# Patient Record
Sex: FEMALE | Race: WHITE | NOT HISPANIC OR LATINO | Employment: OTHER | URBAN - METROPOLITAN AREA
[De-identification: names, ages, dates, MRNs, and addresses within clinical notes are randomized per-mention and may not be internally consistent; named-entity substitution may affect disease eponyms.]

---

## 2017-01-25 ENCOUNTER — HOSPITAL ENCOUNTER (OUTPATIENT)
Dept: RADIOLOGY | Facility: HOSPITAL | Age: 32
Discharge: HOME | End: 2017-01-25
Attending: NURSE PRACTITIONER
Payer: MEDICARE

## 2017-01-25 ENCOUNTER — OFFICE VISIT (OUTPATIENT)
Dept: URGENT CARE | Facility: CLINIC | Age: 32
End: 2017-01-25
Payer: MEDICARE

## 2017-01-25 VITALS
TEMPERATURE: 98 F | WEIGHT: 167.88 LBS | RESPIRATION RATE: 18 BRPM | SYSTOLIC BLOOD PRESSURE: 116 MMHG | HEART RATE: 76 BPM | HEIGHT: 62 IN | BODY MASS INDEX: 30.89 KG/M2 | DIASTOLIC BLOOD PRESSURE: 78 MMHG | OXYGEN SATURATION: 99 %

## 2017-01-25 DIAGNOSIS — M25.561 RIGHT KNEE PAIN, UNSPECIFIED CHRONICITY: Primary | ICD-10-CM

## 2017-01-25 DIAGNOSIS — B35.1 ONYCHOMYCOSIS: ICD-10-CM

## 2017-01-25 PROCEDURE — 73560 X-RAY EXAM OF KNEE 1 OR 2: CPT | Mod: TC,59,PO,LT

## 2017-01-25 PROCEDURE — 73560 X-RAY EXAM OF KNEE 1 OR 2: CPT | Mod: 26,59,LT, | Performed by: RADIOLOGY

## 2017-01-25 PROCEDURE — 99999 PR PBB SHADOW E&M-EST. PATIENT-LVL IV: CPT | Mod: PBBFAC,,, | Performed by: NURSE PRACTITIONER

## 2017-01-25 PROCEDURE — 99214 OFFICE O/P EST MOD 30 MIN: CPT | Mod: S$PBB,,, | Performed by: NURSE PRACTITIONER

## 2017-01-25 PROCEDURE — 73562 X-RAY EXAM OF KNEE 3: CPT | Mod: 26,RT,, | Performed by: RADIOLOGY

## 2017-01-25 RX ORDER — IBUPROFEN 800 MG/1
800 TABLET ORAL 3 TIMES DAILY
Qty: 30 TABLET | Refills: 0 | Status: SHIPPED | OUTPATIENT
Start: 2017-01-25 | End: 2017-02-04

## 2017-01-25 NOTE — MR AVS SNAPSHOT
[facility]  [address]  [facility] [address]  Phone: [phone]  Fax: [phone]                  Marissa Moctezuma   2017 6:20 PM   Office Visit    Description:  Female : 1985   Provider:  Allison Amin NP   Department:  [facility]           Reason for Visit     Knee Pain           Diagnoses this Visit        Comments    Right knee pain, unspecified chronicity    -  Primary Ice, elevate, use ace wrap or sleeve, rest    Onychomycosis                To Do List           Goals (5 Years of Data)     None       These Medications        Disp Refills Start End    ibuprofen (ADVIL,MOTRIN) 800 MG tablet 30 tablet 0 2017    Take 1 tablet (800 mg total) by mouth 3 (three) times daily. - Oral    Pharmacy: [facility] [address] [place] [address] [facility] AT [facility] Ph #: [phone]         [facility] On Call     [facility] On Call Nurse Care Line -  Assistance  Registered nurses in the [facility] On Call Center provide clinical advisement, health education, appointment booking, and other advisory services.  Call for this free service at [phone].             Medications           Message regarding Medications     Verify the changes and/or additions to your medication regime listed below are the same as discussed with your clinician today.  If any of these changes or additions are incorrect, please notify your healthcare provider.        START taking these NEW medications        Refills    ibuprofen (ADVIL,MOTRIN) 800 MG tablet 0    Sig: Take 1 tablet (800 mg total) by mouth 3 (three) times daily.    Class: Normal    Route: Oral      STOP taking these medications     meloxicam (MOBIC) 15 MG tablet TAKE 1 TABLET(15 MG) BY MOUTH EVERY DAY    fluticasone (FLONASE) 50 mcg/actuation nasal spray SPRAY 2 SPRAYS IN EACH NOSTRIL EVERY DAY           Verify that the below list of  "medications is an accurate representation of the medications you are currently taking.  If none reported, the list may be blank. If incorrect, please contact your healthcare provider. Carry this list with you in case of emergency.           Current Medications     montelukast (SINGULAIR) 10 mg tablet TAKE 1 TABLET(10 MG) BY MOUTH EVERY EVENING    norethindrone-ethinyl estradiol (NECON 1/35, 28,) 1-35 mg-mcg per tablet Take 1 tablet by mouth once daily.    gabapentin (NEURONTIN) 300 MG capsule Take 1 capsule (300 mg total) by mouth every evening.    ibuprofen (ADVIL,MOTRIN) 800 MG tablet Take 1 tablet (800 mg total) by mouth 3 (three) times daily.           Clinical Reference Information           Vital Signs - Last Recorded  Most recent update: 1/25/2017  6:51 PM by Alok Childers LPN    BP Pulse Temp Resp Ht    116/78 (BP Location: Right arm, Patient Position: Sitting, BP Method: Manual) 76 98.1 °F (36.7 °C) (Tympanic) 18 5' 2.25" (1.581 m)    Wt LMP SpO2 BMI    76.1 kg (167 lb 14.1 oz) 11/21/2016 99% 30.46 kg/m2      Blood Pressure          Most Recent Value    BP  116/78      Allergies as of 1/25/2017     No Known Allergies      Immunizations Administered on Date of Encounter - 1/25/2017     None      Orders Placed During Today's Visit      Normal Orders This Visit    Ambulatory referral to Podiatry     POCT Apply ace wrap       [facility] Sign-Up     Activating your [facility] account is as easy as 1-2-3!     1) Visit [email or web address], select Sign Up Now, enter this activation code and your date of birth, then select Next.  [email or web address]  Expires: 3/11/2017  7:21 PM      2) Create a username and password to use when you visit [facility] in the future and select a security question in case you lose your password and select Next.    3) Enter your e-mail address and click Sign Up!    Additional Information  If you have questions, please e-mail [email or web address] or call [phone] to talk to our [facility] staff. " Remember, [facility] is NOT to be used for urgent needs. For medical emergencies, dial 911.         Instructions      Knee Pain  Knee pain is very common. Its especially common in active people who put a lot of pressure on their knees, like runners. It affects women more often than men.  Your kneecap (patella) is a thick, round bone. It covers and protects the front portion of your knee joint. It moves along a groove in your thighbone (femur) as part of the patellofemoral joint. A layer of cartilage surrounds the underside of your kneecap. This layer protects it from grinding against your femur.  When this cartilage softens and breaks down, it can cause knee pain. This is partly because of repetitive stress. The stress irritates the lining of the joint. This causes pain in the underlying bone.  What causes knee pain?  Many things can cause knee pain. You may have more than one cause. Some of these include:  · Overuse of the knee joint  · The kneecap doesnt line up with the tissue around it  · Damage to small nerves in the area  · Damage to the ligament-like structure that holds the kneecap in place (retinaculum)  · Breakdown of the bone under the cartilage  · Swelling in the soft tissues around the kneecap  · Injury  You might be more likely to have knee pain if you:  · Exercise a lot  · Recently increased the intensity of your workouts  · Have a body mass index (BMI) greater than 25  · Have poor alignment of your kneecap  · Walk with your feet turned overly outward or inward  · Have weakness in surrounding muscle groups (inner quad or hip adductor muscles)  · Have too much tightness in surrounding muscle groups (hamstrings or iliotibial band)  · Have a recent history of injury to the area  · Are female  Symptoms of knee pain  This type of knee pain is a dull, aching pain in the front of the knee in the area under and around the kneecap. This pain may start quickly or slowly. Your pain might be worse when you  squat, run, or sit for a long time. You might also sometimes feel like your knee is giving out. You may have symptoms in one or both of your knees.  Diagnosing knee pain  Your health care provider will ask about your medical history and your symptoms. Be sure to describe any activities that make your knee pain worse. He or she will look at your knee. This will include tests of your range of motion, strength, and areas of pain of your knee. Your knee alignment will be checked.  Your health care provider will need to rule out other causes of your knee pain, such as arthritis. You may need an imaging test, such as an X-ray or MRI.  Treatment for knee pain  Treatments that can help ease your symptoms may include:  · Avoiding activities for a while that make your pain worse, returning to activity over time  · Icing the outside of your knee when it causes you pain  · Taking over-the-counter pain medicine  · Wearing a knee brace or taping your knee to support it  · Wearing special shoe inserts to help keep your feet in the proper alignment  · Doing special exercises to stretch and strengthen the muscles around your hip and your knee  These steps help most people manage knee pain. But some cases of knee pain need to be treated with surgery. You may need surgery right away. Or you may need it later if other treatments dont work. Your health care provider may refer you to an orthopedic surgeon. He or she will talk with you about your choices.  Preventing knee pain  Losing weight and correcting excess muscle tightness or muscle weakness may help lower your risk.  In some cases, you can prevent knee pain. To help prevent a flare-up of knee pain, you do these things:  · Regularly do all the exercises your doctor or physical therapist advises  · Support your knee as advised by your doctor or physical therapist  · Increase training gradually, and ease up on training when needed  · Have an expert check your gait for running or  other sporting activities  · Stretch properly before and after exercise  · Replace your running shoes regularly  · Lose excess weight     When to call your health care provider  Call your health care provider right away if:  · Your symptoms dont get better after a few weeks of treatment  · You have any new symptoms      © 9642-0616 [employer]. [address] [place] [address]. All rights reserved. This information is not intended as a substitute for professional medical care. Always follow your healthcare professional's instructions.        Fungal Infection of Nails  Fungal infection of the nails changes the way fingernails and toenails look. They may thicken, discolor, change shape, or split. This condition is hard to treat because nails grow slowly and have limited blood supply. It is common for the infection to come back after treatment.  There are 2 types of medicines used.  · Topical anti-fungal medicines are applied to the surface of the skin and nail area. These medicines are not very effective because they cannot get deep into the nail.  · Topical medicines are most useful in combination with oral medicines. Oral antifungal medicines work better because they penetrate the nail from the inside out. However, recurrence still occurs and it may take 9 to 12 months to determine if you have been cured or not (i.e., for your nail to look normal again). You can repeat treatment if needed. Medications are well-tolerated and it is uncommon to need to stop therapy due to side effects, but your doctor may perform some monitoring tests. Discuss potential side effects with your doctor before starting treatment.  If medicines fail, the nail can be removed surgically or chemically. This improves the effectiveness of medical treatment because the fungus is physically removed from the body.  Home care  The following will help you care for your infection at home:  1. Use medicines exactly as directed  for as long as directed. Treating a fungal infection can take longer than other kinds of infections.  2. Smoking is a risk factor for fungal infection. This is one more reason to quit.  3. Wear absorbent socks and shoes that let your feet breathe. Sweaty feet increase risk of fungal infection and make an existing infection harder to treat.  4. Use footwear when in damp public places like swimming pools, gyms, and shower rooms. This will help you avoid the fungus that grows there.  5. Don't share nail clippers or scissors with others.  Follow-up care  Follow up with your doctor as directed by our staff.  When to seek medical care  Get prompt medical attention if any of the following occur:  · Skin by the nail becomes reddened, swollen, painful, or drains pus  · Side effects from oral anti-fungal medicines  © 6929-7506 [employer]. [address] [place] [address]. All rights reserved. This information is not intended as a substitute for professional medical care. Always follow your healthcare professional's instructions.             Smoking Cessation     If you would like to quit smoking:   You may be eligible for free services if you are a [place] resident and started smoking cigarettes before September 1, 1988.  Call the Smoking Cessation Trust (SCT) toll free at (402) 897-3812 or (742) 434-1061.   Call 800-QUIT-NOW if you do not meet the above criteria.

## 2017-01-26 NOTE — PATIENT INSTRUCTIONS
Knee Pain  Knee pain is very common. Its especially common in active people who put a lot of pressure on their knees, like runners. It affects women more often than men.  Your kneecap (patella) is a thick, round bone. It covers and protects the front portion of your knee joint. It moves along a groove in your thighbone (femur) as part of the patellofemoral joint. A layer of cartilage surrounds the underside of your kneecap. This layer protects it from grinding against your femur.  When this cartilage softens and breaks down, it can cause knee pain. This is partly because of repetitive stress. The stress irritates the lining of the joint. This causes pain in the underlying bone.  What causes knee pain?  Many things can cause knee pain. You may have more than one cause. Some of these include:  · Overuse of the knee joint  · The kneecap doesnt line up with the tissue around it  · Damage to small nerves in the area  · Damage to the ligament-like structure that holds the kneecap in place (retinaculum)  · Breakdown of the bone under the cartilage  · Swelling in the soft tissues around the kneecap  · Injury  You might be more likely to have knee pain if you:  · Exercise a lot  · Recently increased the intensity of your workouts  · Have a body mass index (BMI) greater than 25  · Have poor alignment of your kneecap  · Walk with your feet turned overly outward or inward  · Have weakness in surrounding muscle groups (inner quad or hip adductor muscles)  · Have too much tightness in surrounding muscle groups (hamstrings or iliotibial band)  · Have a recent history of injury to the area  · Are female  Symptoms of knee pain  This type of knee pain is a dull, aching pain in the front of the knee in the area under and around the kneecap. This pain may start quickly or slowly. Your pain might be worse when you squat, run, or sit for a long time. You might also sometimes feel like your knee is giving out. You may have symptoms in  one or both of your knees.  Diagnosing knee pain  Your health care provider will ask about your medical history and your symptoms. Be sure to describe any activities that make your knee pain worse. He or she will look at your knee. This will include tests of your range of motion, strength, and areas of pain of your knee. Your knee alignment will be checked.  Your health care provider will need to rule out other causes of your knee pain, such as arthritis. You may need an imaging test, such as an X-ray or MRI.  Treatment for knee pain  Treatments that can help ease your symptoms may include:  · Avoiding activities for a while that make your pain worse, returning to activity over time  · Icing the outside of your knee when it causes you pain  · Taking over-the-counter pain medicine  · Wearing a knee brace or taping your knee to support it  · Wearing special shoe inserts to help keep your feet in the proper alignment  · Doing special exercises to stretch and strengthen the muscles around your hip and your knee  These steps help most people manage knee pain. But some cases of knee pain need to be treated with surgery. You may need surgery right away. Or you may need it later if other treatments dont work. Your health care provider may refer you to an orthopedic surgeon. He or she will talk with you about your choices.  Preventing knee pain  Losing weight and correcting excess muscle tightness or muscle weakness may help lower your risk.  In some cases, you can prevent knee pain. To help prevent a flare-up of knee pain, you do these things:  · Regularly do all the exercises your doctor or physical therapist advises  · Support your knee as advised by your doctor or physical therapist  · Increase training gradually, and ease up on training when needed  · Have an expert check your gait for running or other sporting activities  · Stretch properly before and after exercise  · Replace your running shoes regularly  · Lose  excess weight     When to call your health care provider  Call your health care provider right away if:  · Your symptoms dont get better after a few weeks of treatment  · You have any new symptoms      © 7297-3914 [employer]. [address] [place] [address], [place] [address]. All rights reserved. This information is not intended as a substitute for professional medical care. Always follow your healthcare professional's instructions.        Fungal Infection of Nails  Fungal infection of the nails changes the way fingernails and toenails look. They may thicken, discolor, change shape, or split. This condition is hard to treat because nails grow slowly and have limited blood supply. It is common for the infection to come back after treatment.  There are 2 types of medicines used.  · Topical anti-fungal medicines are applied to the surface of the skin and nail area. These medicines are not very effective because they cannot get deep into the nail.  · Topical medicines are most useful in combination with oral medicines. Oral antifungal medicines work better because they penetrate the nail from the inside out. However, recurrence still occurs and it may take 9 to 12 months to determine if you have been cured or not (i.e., for your nail to look normal again). You can repeat treatment if needed. Medications are well-tolerated and it is uncommon to need to stop therapy due to side effects, but your doctor may perform some monitoring tests. Discuss potential side effects with your doctor before starting treatment.  If medicines fail, the nail can be removed surgically or chemically. This improves the effectiveness of medical treatment because the fungus is physically removed from the body.  Home care  The following will help you care for your infection at home:  1. Use medicines exactly as directed for as long as directed. Treating a fungal infection can take longer than other kinds of infections.  2. Smoking is a  risk factor for fungal infection. This is one more reason to quit.  3. Wear absorbent socks and shoes that let your feet breathe. Sweaty feet increase risk of fungal infection and make an existing infection harder to treat.  4. Use footwear when in damp public places like swimming pools, gyms, and shower rooms. This will help you avoid the fungus that grows there.  5. Don't share nail clippers or scissors with others.  Follow-up care  Follow up with your doctor as directed by our staff.  When to seek medical care  Get prompt medical attention if any of the following occur:  · Skin by the nail becomes reddened, swollen, painful, or drains pus  · Side effects from oral anti-fungal medicines  © 3944-1599 [employer]. [address] [place] [address], [place] [address]. All rights reserved. This information is not intended as a substitute for professional medical care. Always follow your healthcare professional's instructions.

## 2017-01-26 NOTE — PROGRESS NOTES
Subjective:       Patient ID: Marissa Moctezuma is a 31 y.o. female.    Chief Complaint: Knee Pain (Right knee )    Knee Pain    Incident onset: 2-3 weeks. There was no injury mechanism (increased walking). The pain is present in the right knee. Pertinent negatives include no inability to bear weight, loss of motion, loss of sensation, muscle weakness, numbness or tingling. The symptoms are aggravated by palpation, movement and weight bearing. She has tried NSAIDs (low dose x1) for the symptoms. The treatment provided mild relief.     Review of Systems   Musculoskeletal: Positive for gait problem and joint swelling.   Skin: Negative for wound.   Neurological: Negative for tingling and numbness.       Objective:      Physical Exam   Constitutional: She is oriented to person, place, and time. She appears well-developed and well-nourished. No distress.   Musculoskeletal:        Right knee: She exhibits swelling (mild). She exhibits normal range of motion, no ecchymosis, no deformity, no laceration, no erythema, normal alignment, no LCL laxity and normal patellar mobility. Tenderness found.        Left knee: She exhibits normal range of motion, no effusion, no ecchymosis, no deformity, no laceration, no erythema, normal alignment and no LCL laxity. No tenderness found.        Right lower leg: Normal.        Legs:  MILAN crepitus noted; no increased warmth or erythema, skin intact, negative Homans;    Neurological: She is alert and oriented to person, place, and time.   Skin: She is not diaphoretic.   Fungus of nails of left foot       Assessment:       1. Right knee pain, unspecified chronicity    2. Onychomycosis        Plan:   Marissa was seen today for knee pain.    Diagnoses and all orders for this visit:    Right knee pain, unspecified chronicity  Comments:  Ice, elevate, use ace wrap or sleeve, rest  Orders:  -     ibuprofen (ADVIL,MOTRIN) 800 MG tablet; Take 1 tablet (800 mg total) by mouth 3 (three) times  daily.  -     POCT Apply ace wrap  -     X-ray Knee Ortho Right    Onychomycosis  -     Ambulatory referral to Podiatry      Counseled on using ice, gentle ROM, elevation, rest, avoid aggravating factors. Follow up with PCP in 1 week if no improvement/worsening or sooner if fever, redness/color change, increased pain, increased swelling to area and/or numbness/tingling to extremity.  -     Diagnosis, treatment, AVS reviewed with patient  -     Patient understands and agrees with plan

## 2017-01-27 NOTE — PROGRESS NOTES
Called patient and informed of results.  Expressed consent and understanding.  States that it is starting to feel a little better.

## 2017-03-06 ENCOUNTER — OFFICE VISIT (OUTPATIENT)
Dept: INTERNAL MEDICINE | Facility: CLINIC | Age: 32
End: 2017-03-06
Payer: MEDICARE

## 2017-03-06 VITALS
HEART RATE: 87 BPM | HEIGHT: 62 IN | WEIGHT: 173.5 LBS | DIASTOLIC BLOOD PRESSURE: 84 MMHG | OXYGEN SATURATION: 95 % | TEMPERATURE: 98 F | SYSTOLIC BLOOD PRESSURE: 116 MMHG | BODY MASS INDEX: 31.93 KG/M2

## 2017-03-06 DIAGNOSIS — M47.817 SPONDYLOSIS OF LUMBOSACRAL REGION WITHOUT MYELOPATHY OR RADICULOPATHY: ICD-10-CM

## 2017-03-06 DIAGNOSIS — J06.9 VIRAL UPPER RESPIRATORY ILLNESS: Primary | ICD-10-CM

## 2017-03-06 DIAGNOSIS — G89.21 CHRONIC PAIN DUE TO TRAUMA: ICD-10-CM

## 2017-03-06 DIAGNOSIS — Z00.00 ROUTINE GENERAL MEDICAL EXAMINATION AT A HEALTH CARE FACILITY: ICD-10-CM

## 2017-03-06 DIAGNOSIS — E78.5 HYPERLIPIDEMIA, UNSPECIFIED HYPERLIPIDEMIA TYPE: ICD-10-CM

## 2017-03-06 DIAGNOSIS — M79.18 MYOFASCIAL PAIN: ICD-10-CM

## 2017-03-06 PROCEDURE — 99999 PR PBB SHADOW E&M-EST. PATIENT-LVL III: CPT | Mod: PBBFAC,,, | Performed by: INTERNAL MEDICINE

## 2017-03-06 PROCEDURE — 99213 OFFICE O/P EST LOW 20 MIN: CPT | Mod: S$PBB,,, | Performed by: INTERNAL MEDICINE

## 2017-03-06 PROCEDURE — 99213 OFFICE O/P EST LOW 20 MIN: CPT | Mod: PBBFAC,PO | Performed by: INTERNAL MEDICINE

## 2017-03-06 RX ORDER — GABAPENTIN 300 MG/1
300 CAPSULE ORAL NIGHTLY
Qty: 30 CAPSULE | Refills: 2
Start: 2017-03-06 | End: 2017-08-14

## 2017-03-06 RX ORDER — FLUTICASONE PROPIONATE 50 MCG
1 SPRAY, SUSPENSION (ML) NASAL DAILY
Qty: 1 BOTTLE | Refills: 11 | Status: SHIPPED | OUTPATIENT
Start: 2017-03-06 | End: 2017-11-08

## 2017-03-06 RX ORDER — MINERAL OIL
180 ENEMA (ML) RECTAL DAILY
Qty: 30 TABLET | Refills: 11 | Status: SHIPPED | OUTPATIENT
Start: 2017-03-06 | End: 2017-03-13

## 2017-03-06 NOTE — MR AVS SNAPSHOT
[facility]  [address] [place] [address]  Phone: [phone]                  Marissa Moctezuma   3/6/2017 3:00 PM   Office Visit    Description:  Female : 1985   Provider:  Wyatt Leon MD   Department:  [facility]           Reason for Visit     Cough     URI           Diagnoses this Visit        Comments    Viral upper respiratory illness    -  Primary     Spondylosis of lumbosacral region without myelopathy or radiculopathy         Myofascial pain         Chronic pain due to trauma         Routine general medical examination at a health care facility         Hyperlipidemia, unspecified hyperlipidemia type                To Do List           Future Appointments        Provider Department Dept Phone    3/13/2017 9:00 AM Wyatt Leon MD [facility] [phone]    3/13/2017 10:50 AM LABORATORY, [facility] [phone]      Goals (5 Years of Data)     None       These Medications        Disp Refills Start End    gabapentin (NEURONTIN) 300 MG capsule 30 capsule 2 3/6/2017 2017    Take 1 capsule (300 mg total) by mouth every evening. - Oral    Pharmacy: [facility] [address] [place] [address] [place] - [address] [facility] Ph #: [phone]       fluticasone (FLONASE) 50 mcg/actuation nasal spray 1 Bottle 11 3/6/2017     1 spray by Each Nare route once daily. - Each Nare    Pharmacy: [facility] [address] [place] [address] [place] - [address] [facility] Ph #: [phone]       fexofenadine (ALLEGRA) 180 MG tablet 30 tablet 11 3/6/2017 3/6/2018    Take 1 tablet (180 mg total) by mouth once daily. - Oral    Pharmacy: [facility] [address] [place] [address] [place] - [address] [facility] Ph #: [phone]         [facility]     [facility] Care [facility] -   "Assistance  Registered nurses in the [facility] provide clinical advisement, health education, appointment booking, and other advisory services.  Call for this free service at [phone].             Medications           Message regarding Medications     Verify the changes and/or additions to your medication regime listed below are the same as discussed with your clinician today.  If any of these changes or additions are incorrect, please notify your healthcare provider.        START taking these NEW medications        Refills    fluticasone (FLONASE) 50 mcg/actuation nasal spray 11    Si spray by Each Nare route once daily.    Class: Normal    Route: Each Nare    fexofenadine (ALLEGRA) 180 MG tablet 11    Sig: Take 1 tablet (180 mg total) by mouth once daily.    Class: Normal    Route: Oral           Verify that the below list of medications is an accurate representation of the medications you are currently taking.  If none reported, the list may be blank. If incorrect, please contact your healthcare provider. Carry this list with you in case of emergency.           Current Medications     montelukast (SINGULAIR) 10 mg tablet TAKE 1 TABLET(10 MG) BY MOUTH EVERY EVENING    norethindrone-ethinyl estradiol (NECON , ,) 1-35 mg-mcg per tablet Take 1 tablet by mouth once daily.    fexofenadine (ALLEGRA) 180 MG tablet Take 1 tablet (180 mg total) by mouth once daily.    fluticasone (FLONASE) 50 mcg/actuation nasal spray 1 spray by Each Nare route once daily.    gabapentin (NEURONTIN) 300 MG capsule Take 1 capsule (300 mg total) by mouth every evening.           Clinical Reference Information           Your Vitals Were     BP Pulse Temp Height Weight SpO2    116/84 87 97.8 °F (36.6 °C) (Tympanic) 5' 2" (1.575 m) 78.7 kg (173 lb 8 oz) 95%    BMI                31.73 kg/m2          Blood Pressure          Most Recent Value    BP  116/84      Allergies as of 3/6/2017     No Known Allergies    "   Immunizations Administered on Date of Encounter - 3/6/2017     None      Orders Placed During Today's Visit     Future Labs/Procedures Expected by Expires    CBC auto differential  3/6/2017 5/5/2018    Comprehensive metabolic panel  3/6/2017 5/5/2018    Lipid panel  3/6/2017 5/5/2018    TSH  3/6/2017 5/5/2018      [facility] Sign-Up     Activating your [facility] account is as easy as 1-2-3!     1) Visit "[email or web address], select Sign Up Now, enter this activation code and your date of birth, then select Next.  [email or web address]  Expires: 3/11/2017  7:21 PM      2) Create a username and password to use when you visit [facility] in the future and select a security question in case you lose your password and select Next.    3) Enter your e-mail address and click Sign Up!    Additional Information  If you have questions, please e-mail [email or web address] or call [phone] to talk to our [facility] staff. Remember, [facility] is NOT to be used for urgent needs. For medical emergencies, dial 911.         Smoking Cessation     If you would like to quit smoking:   You may be eligible for free services if you are a [place] resident and started smoking cigarettes before September 1, 1988.  Call the Smoking Cessation Trust ([facility]) toll free at (889) 032-1381 or (568) 262-8777.   Call 1-800-QUIT-NOW if you do not meet the above criteria.            Language Assistance Services     ATTENTION: Language assistance services are available, free of charge. Please call [phone].      ATENCIÓN: Si habla español, tiene a amaya disposición servicios gratuitos de asistencia lingüística. Llame al [phone].     CHÚ Ý: N?u b?n nói Ti?ng Vi?t, có các d?ch v? h? tr? ngôn ng? mi?n phí dành cho b?n. G?i s? [phone].         [place] [facility] complies with applicable Federal civil rights laws and does not discriminate on the basis of race, color, national origin, age, disability, or sex.

## 2017-03-06 NOTE — PROGRESS NOTES
"HPI:  Patient is a 31-year-old female who comes in today with complaints of sinus and head and chest congestion for the last 2 weeks.  She denies any productive sputum.  She's had no fever, chills or sweats.  She also complains of feeling bloated in her abdomen.  She denies any nausea, vomiting, diarrhea or change in her stools    Current meds have been verified and updated per the EMR  Exam:/84  Pulse 87  Temp 97.8 °F (36.6 °C) (Tympanic)   Ht 5' 2" (1.575 m)  Wt 78.7 kg (173 lb 8 oz)  SpO2 95%  BMI 31.73 kg/m2  TMs normal, throat normal, nasal mucosa unremarkable, chest clear, abdomen soft, benign, no rebound no guarding, no distention.  Bowel sounds normal    Impression:  ALLERGIC rhinitis  Patient Active Problem List   Diagnosis   (none) - all problems resolved or deleted       Plan:  Orders Placed This Encounter    Comprehensive metabolic panel    Lipid panel    TSH    CBC auto differential    gabapentin (NEURONTIN) 300 MG capsule    fluticasone (FLONASE) 50 mcg/actuation nasal spray    fexofenadine (ALLEGRA) 180 MG tablet     She was told to continue with the Singulair and also was started on Flonase and Allegra.  She will see me after have the above lab work for her annual physical.    "

## 2017-03-07 DIAGNOSIS — Z30.41 ENCOUNTER FOR SURVEILLANCE OF CONTRACEPTIVE PILLS: ICD-10-CM

## 2017-03-08 RX ORDER — NORETHINDRONE AND ETHINYL ESTRADIOL 1 MG-35MCG
KIT ORAL
Qty: 84 TABLET | Refills: 1 | OUTPATIENT
Start: 2017-03-08

## 2017-03-08 NOTE — TELEPHONE ENCOUNTER
----- Message from Pierre Kerr sent at 3/8/2017  3:24 PM CST -----  Contact: pt  She's calling in regards to incorrect BC pills given to her, please advise, [phone] (home)

## 2017-03-09 ENCOUNTER — TELEPHONE (OUTPATIENT)
Dept: OBSTETRICS AND GYNECOLOGY | Facility: CLINIC | Age: 32
End: 2017-03-09

## 2017-03-13 ENCOUNTER — LAB VISIT (OUTPATIENT)
Dept: LAB | Facility: HOSPITAL | Age: 32
End: 2017-03-13
Attending: INTERNAL MEDICINE
Payer: MEDICARE

## 2017-03-13 ENCOUNTER — OFFICE VISIT (OUTPATIENT)
Dept: INTERNAL MEDICINE | Facility: CLINIC | Age: 32
End: 2017-03-13
Payer: MEDICARE

## 2017-03-13 VITALS
WEIGHT: 172.81 LBS | HEIGHT: 62 IN | SYSTOLIC BLOOD PRESSURE: 110 MMHG | HEART RATE: 99 BPM | BODY MASS INDEX: 31.8 KG/M2 | DIASTOLIC BLOOD PRESSURE: 78 MMHG | OXYGEN SATURATION: 98 % | TEMPERATURE: 99 F

## 2017-03-13 DIAGNOSIS — J06.9 VIRAL UPPER RESPIRATORY ILLNESS: ICD-10-CM

## 2017-03-13 DIAGNOSIS — Z12.4 CERVICAL CANCER SCREENING: ICD-10-CM

## 2017-03-13 DIAGNOSIS — E78.5 HYPERLIPIDEMIA, UNSPECIFIED HYPERLIPIDEMIA TYPE: ICD-10-CM

## 2017-03-13 DIAGNOSIS — J31.0 CHRONIC RHINITIS: ICD-10-CM

## 2017-03-13 DIAGNOSIS — Z00.00 ROUTINE GENERAL MEDICAL EXAMINATION AT A HEALTH CARE FACILITY: Primary | ICD-10-CM

## 2017-03-13 DIAGNOSIS — S06.9X9D: ICD-10-CM

## 2017-03-13 DIAGNOSIS — M47.817 SPONDYLOSIS OF LUMBOSACRAL REGION WITHOUT MYELOPATHY OR RADICULOPATHY: ICD-10-CM

## 2017-03-13 DIAGNOSIS — Z00.00 ROUTINE GENERAL MEDICAL EXAMINATION AT A HEALTH CARE FACILITY: ICD-10-CM

## 2017-03-13 PROCEDURE — 99214 OFFICE O/P EST MOD 30 MIN: CPT | Mod: S$PBB,,, | Performed by: INTERNAL MEDICINE

## 2017-03-13 PROCEDURE — 99999 PR PBB SHADOW E&M-EST. PATIENT-LVL IV: CPT | Mod: PBBFAC,,, | Performed by: INTERNAL MEDICINE

## 2017-03-13 RX ORDER — BENZONATATE 200 MG/1
200 CAPSULE ORAL 3 TIMES DAILY PRN
Qty: 30 CAPSULE | Refills: 1 | Status: SHIPPED | OUTPATIENT
Start: 2017-03-13 | End: 2017-03-23

## 2017-03-13 NOTE — MR AVS SNAPSHOT
[facility]  [address] [place] [address]  Phone: [phone]                  Marissa Moctezuma   3/13/2017 9:00 AM   Office Visit    Description:  Female : 1985   Provider:  Wyatt Leon MD   Department:  [facility]           Diagnoses this Visit        Comments    Routine general medical examination at a health care facility    -  Primary     Closed TBI (traumatic brain injury), with loss of consciousness of unspecified duration, subsequent encounter         Chronic rhinitis         Cervical cancer screening         Viral upper respiratory illness                To Do List           Future Appointments        Provider Department Dept Phone    3/13/2017 10:50 AM LABORATORY, [facility] [phone]      Goals (5 Years of Data)     None       These Medications        Disp Refills Start End    benzonatate (TESSALON) 200 MG capsule 30 capsule 1 3/13/2017 3/23/2017    Take 1 capsule (200 mg total) by mouth 3 (three) times daily as needed for Cough. - Oral    Pharmacy: [facility] [address] [place] [address] [address] [facility] Ph #: [phone]         [facility]     [facility] Care [facility] -  Assistance  Registered nurses in the [facility] provide clinical advisement, health education, appointment booking, and other advisory services.  Call for this free service at [phone].             Medications           Message regarding Medications     Verify the changes and/or additions to your medication regime listed below are the same as discussed with your clinician today.  If any of these changes or additions are incorrect, please notify your healthcare provider.        START taking these NEW medications        Refills    benzonatate (TESSALON) 200 MG capsule 1    Sig: Take 1 capsule (200 mg total) by mouth 3 (three) times daily as needed for  "Cough.    Class: Normal    Route: Oral      STOP taking these medications     fexofenadine (ALLEGRA) 180 MG tablet Take 1 tablet (180 mg total) by mouth once daily.           Verify that the below list of medications is an accurate representation of the medications you are currently taking.  If none reported, the list may be blank. If incorrect, please contact your healthcare provider. Carry this list with you in case of emergency.           Current Medications     fluticasone (FLONASE) 50 mcg/actuation nasal spray 1 spray by Each Nare route once daily.    gabapentin (NEURONTIN) 300 MG capsule Take 1 capsule (300 mg total) by mouth every evening.    montelukast (SINGULAIR) 10 mg tablet TAKE 1 TABLET(10 MG) BY MOUTH EVERY EVENING    norethindrone-ethinyl estradiol (NECON 1/35, 28,) 1-35 mg-mcg per tablet Take 1 tablet by mouth once daily.    benzonatate (TESSALON) 200 MG capsule Take 1 capsule (200 mg total) by mouth 3 (three) times daily as needed for Cough.           Clinical Reference Information           Your Vitals Were     BP Pulse Temp Height Weight SpO2    110/78 99 98.5 °F (36.9 °C) (Tympanic) 5' 2" (1.575 m) 78.4 kg (172 lb 13.5 oz) 98%    BMI                31.61 kg/m2          Blood Pressure          Most Recent Value    BP  110/78      Allergies as of 3/13/2017     No Known Allergies      Immunizations Administered on Date of Encounter - 3/13/2017     Name Date Dose VIS Date Route    Pneumococcal Conjugate - 13 Valent 3/13/2017 0.5 mL 11/5/2015 Intramuscular      Orders Placed During Today's Visit      Normal Orders This Visit    Liquid-based pap smear, screening     Pneumococcal Conjugate Vaccine (13 Valent) (IM)       [facility] Sign-Up     Activating your [facility] account is as easy as 1-2-3!     1) Visit [email or web address], select Sign Up Now, enter this activation code and your date of birth, then select Next.  [email or web address]  Expires: 4/27/2017  9:53 AM      2) Create a username and password to use " when you visit [facility] in the future and select a security question in case you lose your password and select Next.    3) Enter your e-mail address and click Sign Up!    Additional Information  If you have questions, please e-mail [email or web address] or call [phone] to talk to our [employer] staff. Remember, [facility] is NOT to be used for urgent needs. For medical emergencies, dial 911.         Smoking Cessation     If you would like to quit smoking:   You may be eligible for free services if you are a [place] resident and started smoking cigarettes before September 1, 1988.  Call the Smoking Cessation Trust ([facility]) toll free at (809) 410-6949 or (856) 179-7965.   Call 3-063-QUIT-NOW if you do not meet the above criteria.            Language Assistance Services     ATTENTION: Language assistance services are available, free of charge. Please call [phone].      ATENCIÓN: Si habla español, tiene a amaya disposición servicios gratuitos de asistencia lingüística. Llame al [phone].     [facility] Ý: N?u b?n nói Ti?ng Vi?t, có các d?ch v? h? tr? ngôn ng? mi?n phí dành cho b?n. G?i s? [phone].         [facility] complies with applicable Federal civil rights laws and does not discriminate on the basis of race, color, national origin, age, disability, or sex.

## 2017-03-13 NOTE — PROGRESS NOTES
Administered Prevnar 13 IM Right Deltoid Per Dr. Leon. See immunization record. Advised to remain in lobby 15 min to monitor for adverse reaction. Tolerated well/TGD
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
## 2017-03-16 NOTE — PROGRESS NOTES
Your pap smear was normal.      Thanks for trusting us with your healthcare needs and using [facility]. If you want to ask us a question, you can do so by replying to this message or by calling [phone].    Sincerely,    Jeannette Leon M.D.      To rate your experience with Dr. Leon please click on the link below:    [email or web address]=&entityCode=&method=&loc=[email or web address]+[email or web address]&pt=[email or web address]&isNeighborhood=&locType=%7Cstate%7Ccity%7Czip&locIs[place]City=

## 2017-05-16 ENCOUNTER — OFFICE VISIT (OUTPATIENT)
Dept: INTERNAL MEDICINE | Facility: CLINIC | Age: 32
End: 2017-05-16
Payer: MEDICARE

## 2017-05-16 VITALS
SYSTOLIC BLOOD PRESSURE: 137 MMHG | TEMPERATURE: 99 F | WEIGHT: 169.75 LBS | RESPIRATION RATE: 16 BRPM | BODY MASS INDEX: 31.24 KG/M2 | HEART RATE: 107 BPM | HEIGHT: 62 IN | DIASTOLIC BLOOD PRESSURE: 90 MMHG

## 2017-05-16 DIAGNOSIS — J01.00 ACUTE MAXILLARY SINUSITIS, RECURRENCE NOT SPECIFIED: ICD-10-CM

## 2017-05-16 DIAGNOSIS — B35.1 ONYCHOMYCOSIS: Primary | ICD-10-CM

## 2017-05-16 PROCEDURE — 99213 OFFICE O/P EST LOW 20 MIN: CPT | Mod: S$PBB,,, | Performed by: NURSE PRACTITIONER

## 2017-05-16 PROCEDURE — 99213 OFFICE O/P EST LOW 20 MIN: CPT | Mod: PBBFAC,PO | Performed by: NURSE PRACTITIONER

## 2017-05-16 PROCEDURE — 99999 PR PBB SHADOW E&M-EST. PATIENT-LVL III: CPT | Mod: PBBFAC,,, | Performed by: NURSE PRACTITIONER

## 2017-05-16 RX ORDER — AZITHROMYCIN 250 MG/1
TABLET, FILM COATED ORAL
Qty: 6 TABLET | Refills: 0 | Status: SHIPPED | OUTPATIENT
Start: 2017-05-16 | End: 2017-08-14

## 2017-05-16 NOTE — MR AVS SNAPSHOT
[place]  [address] [place] [address]  Phone: [phone]                  Marissa Moctezuma   2017 1:20 PM   Office Visit    Description:  Female : 1985   Provider:  Aldair Mancia NP   Department:  [facility]           Reason for Visit     Nail Problem     Sore Throat           Diagnoses this Visit        Comments    Onychomycosis    -  Primary            To Do List           Future Appointments        Provider Department Dept Phone    2017 2:40 PM Abbie Renee DPM [facility] [phone]      Goals (5 Years of Data)     None       These Medications        Disp Refills Start End    azithromycin (Z-MIKEY) 250 MG tablet 6 tablet 0 2017     Take as directed    Pharmacy: [facility] [address] [place] [address] [address] AT [facility] Ph #: [phone]         [facility] On Call     [facility] On Call Nurse Care Line -  Assistance  Unless otherwise directed by your provider, please contact [facility], our nurse care line that is available for  assistance.     Registered nurses in the [facility] provide: appointment scheduling, clinical advisement, health education, and other advisory services.  Call: [phone] (toll free)               Medications           Message regarding Medications     Verify the changes and/or additions to your medication regime listed below are the same as discussed with your clinician today.  If any of these changes or additions are incorrect, please notify your healthcare provider.        START taking these NEW medications        Refills    azithromycin (Z-MIKEY) 250 MG tablet 0    Sig: Take as directed    Class: Normal           Verify that the below list of medications is an accurate representation of the medications you are currently taking.  If none reported, the list may be blank. If incorrect, please contact your  "healthcare provider. Carry this list with you in case of emergency.           Current Medications     fluticasone (FLONASE) 50 mcg/actuation nasal spray 1 spray by Each Nare route once daily.    norethindrone-ethinyl estradiol (NECON 1/35, 28,) 1-35 mg-mcg per tablet Take 1 tablet by mouth once daily.    azithromycin (Z-MIKEY) 250 MG tablet Take as directed    gabapentin (NEURONTIN) 300 MG capsule Take 1 capsule (300 mg total) by mouth every evening.    montelukast (SINGULAIR) 10 mg tablet TAKE 1 TABLET(10 MG) BY MOUTH EVERY EVENING           Clinical Reference Information           Your Vitals Were     BP Pulse Temp Resp Height Weight    137/90 (BP Location: Left arm, Patient Position: Sitting) 107 99 °F (37.2 °C) (Tympanic) 16 5' 2" (1.575 m) 77 kg (169 lb 12.1 oz)    Last Period PF BMI          04/18/2017 98 L/min 31.05 kg/m2        Blood Pressure          Most Recent Value    BP  (!)  137/90      Allergies as of 5/16/2017     No Known Allergies      Immunizations Administered on Date of Encounter - 5/16/2017     None      Orders Placed During Today's Visit      Normal Orders This Visit    Ambulatory Referral to Podiatry       [facility] Sign-Up     Activating your [facility] account is as easy as 1-2-3!     1) Visit [email or web address], select Sign Up Now, enter this activation code and your date of birth, then select Next.  [email or web address]  Expires: 6/30/2017  2:02 PM      2) Create a username and password to use when you visit [facility] in the future and select a security question in case you lose your password and select Next.    3) Enter your e-mail address and click Sign Up!    Additional Information  If you have questions, please e-mail [email or web address] or call [phone] to talk to our [facility] staff. Remember, [facility] is NOT to be used for urgent needs. For medical emergencies, dial 911.         Smoking Cessation     If you would like to quit smoking:   You may be eligible for free services if you " are a [place] resident and started smoking cigarettes before September 1, 1988.  Call the Smoking Cessation Trust ([facility]) toll free at (929) 001-7249 or (240) 911-3571.   Call 1-800-QUIT-NOW if you do not meet the above criteria.   Contact us via email: [email or web address]   View our website for more information: [email or web address]        Language Assistance Services     ATTENTION: Language assistance services are available, free of charge. Please call [phone].      ATENCIÓN: Si habla español, tiene a amaya disposición servicios gratuitos de asistencia lingüística. Llame al [phone].     CHÚ Ý: N?u b?n nói Ti?ng Vi?t, có các d?ch v? h? tr? ngôn ng? mi?n phí dành cho b?n. G?i s? [phone].         [facility] complies with applicable Federal civil rights laws and does not discriminate on the basis of race, color, national origin, age, disability, or sex.
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
## 2017-05-23 ENCOUNTER — TELEPHONE (OUTPATIENT)
Dept: OBSTETRICS AND GYNECOLOGY | Facility: CLINIC | Age: 32
End: 2017-05-23

## 2017-05-23 NOTE — TELEPHONE ENCOUNTER
----- Message from Ruby Chamorro sent at 5/23/2017  9:02 AM CDT -----  Contact: Pt   Pt is requesting to speak with the nurse in regards to a problem she is having./ Please advise [phone]

## 2017-05-23 NOTE — TELEPHONE ENCOUNTER
Spoke with patient, she is complaining of vaginal odor. I advised the patient to schedule an appointment to come in for evaluation.  She states that she will call back in 10 days due to transportation.

## 2017-05-23 NOTE — TELEPHONE ENCOUNTER
----- Message from Ruby Chamorro sent at 5/23/2017  9:02 AM CDT -----  Contact: Pt   Pt is requesting to speak with the nurse in regards to a problem she is having./ Please advise [phone]

## 2017-06-11 ENCOUNTER — NURSE TRIAGE (OUTPATIENT)
Dept: ADMINISTRATIVE | Facility: CLINIC | Age: 32
End: 2017-06-11

## 2017-06-11 NOTE — TELEPHONE ENCOUNTER
"    Reason for Disposition   Caller requesting a NON-URGENT new prescription or refill and triager unable to refill per unit policy    Answer Assessment - Initial Assessment Questions  1. SYMPTOMS: "Do you have any symptoms?"      Back pain  2. SEVERITY: If symptoms are present, ask "Are they mild, moderate or severe?"      "Really hurt"    Protocols used: ST MEDICATION QUESTION CALL-A-    Patient is made aware of her last conversation with doctors office that she must have an appointment before another refill is called in. Offered to make the appointment. Patient declined.  "

## 2017-06-22 DIAGNOSIS — Z30.41 ENCOUNTER FOR SURVEILLANCE OF CONTRACEPTIVE PILLS: ICD-10-CM

## 2017-06-23 RX ORDER — NORETHINDRONE AND ETHINYL ESTRADIOL 1 MG-35MCG
1 KIT ORAL DAILY
Qty: 28 TABLET | Refills: 1 | Status: SHIPPED | OUTPATIENT
Start: 2017-06-23 | End: 2017-09-14 | Stop reason: SDUPTHER

## 2017-08-14 ENCOUNTER — HOSPITAL ENCOUNTER (OUTPATIENT)
Dept: RADIOLOGY | Facility: HOSPITAL | Age: 32
Discharge: HOME | End: 2017-08-14
Attending: NURSE PRACTITIONER
Payer: MEDICARE

## 2017-08-14 ENCOUNTER — OFFICE VISIT (OUTPATIENT)
Dept: INTERNAL MEDICINE | Facility: CLINIC | Age: 32
End: 2017-08-14
Payer: MEDICARE

## 2017-08-14 ENCOUNTER — TELEPHONE (OUTPATIENT)
Dept: INTERNAL MEDICINE | Facility: CLINIC | Age: 32
End: 2017-08-14

## 2017-08-14 VITALS
OXYGEN SATURATION: 98 % | WEIGHT: 166.69 LBS | TEMPERATURE: 98 F | DIASTOLIC BLOOD PRESSURE: 72 MMHG | BODY MASS INDEX: 30.67 KG/M2 | HEIGHT: 62 IN | HEART RATE: 92 BPM | SYSTOLIC BLOOD PRESSURE: 122 MMHG

## 2017-08-14 DIAGNOSIS — M79.672 LEFT FOOT PAIN: ICD-10-CM

## 2017-08-14 DIAGNOSIS — M79.672 LEFT FOOT PAIN: Primary | ICD-10-CM

## 2017-08-14 DIAGNOSIS — R94.5 ABNORMAL LIVER FUNCTION: ICD-10-CM

## 2017-08-14 DIAGNOSIS — B35.1 ONYCHOMYCOSIS: ICD-10-CM

## 2017-08-14 PROCEDURE — 73630 X-RAY EXAM OF FOOT: CPT | Mod: TC,PO,LT

## 2017-08-14 PROCEDURE — 73630 X-RAY EXAM OF FOOT: CPT | Mod: 26,LT,, | Performed by: RADIOLOGY

## 2017-08-14 PROCEDURE — 99999 PR PBB SHADOW E&M-EST. PATIENT-LVL III: CPT | Mod: PBBFAC,,, | Performed by: NURSE PRACTITIONER

## 2017-08-14 PROCEDURE — 3008F BODY MASS INDEX DOCD: CPT | Mod: ,,, | Performed by: NURSE PRACTITIONER

## 2017-08-14 PROCEDURE — 99214 OFFICE O/P EST MOD 30 MIN: CPT | Mod: S$PBB,,, | Performed by: NURSE PRACTITIONER

## 2017-08-14 RX ORDER — GABAPENTIN 400 MG/1
400 CAPSULE ORAL NIGHTLY
Qty: 30 CAPSULE | Refills: 11 | Status: SHIPPED | OUTPATIENT
Start: 2017-08-14 | End: 2018-05-01 | Stop reason: SDUPTHER

## 2017-08-14 RX ORDER — AMOXICILLIN 875 MG/1
875 TABLET, FILM COATED ORAL EVERY 12 HOURS
Qty: 14 TABLET | Refills: 0 | Status: SHIPPED | OUTPATIENT
Start: 2017-08-14 | End: 2017-09-14 | Stop reason: ALTCHOICE
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
## 2017-08-15 ENCOUNTER — TELEPHONE (OUTPATIENT)
Dept: INTERNAL MEDICINE | Facility: CLINIC | Age: 32
End: 2017-08-15

## 2017-08-15 DIAGNOSIS — R74.8 ABNORMAL LIVER ENZYMES: Primary | ICD-10-CM

## 2017-08-15 RX ORDER — TERBINAFINE HYDROCHLORIDE 250 MG/1
250 TABLET ORAL DAILY
Qty: 30 TABLET | Refills: 0 | Status: SHIPPED | OUTPATIENT
Start: 2017-08-15 | End: 2017-09-14 | Stop reason: ALTCHOICE

## 2017-08-15 NOTE — TELEPHONE ENCOUNTER
S/w pt. Advised as listed. Scheduled labs for one month and 2 months from today. Pt verbalized understanding/TGD

## 2017-08-15 NOTE — TELEPHONE ENCOUNTER
Please let her know her liver test is good per her blood work.  I will start the Lamisil 250 mg daily for 3 months.   She has to have labs every 4 weeks during the treatment, it can be hard on the liver.  Please schedule the AST and ALT labs in 4 weeks then in 4 weeks after that.  I will reorder as we go and I know labs still look good.  I have sent in the first month of the medication to [facility].      thanks

## 2017-08-17 DIAGNOSIS — Z30.41 ENCOUNTER FOR SURVEILLANCE OF CONTRACEPTIVE PILLS: ICD-10-CM

## 2017-08-17 RX ORDER — NORETHINDRONE AND ETHINYL ESTRADIOL 1 MG-35MCG
KIT ORAL
Qty: 28 TABLET | Refills: 0 | OUTPATIENT
Start: 2017-08-17

## 2017-09-08 DIAGNOSIS — Z30.41 ENCOUNTER FOR SURVEILLANCE OF CONTRACEPTIVE PILLS: ICD-10-CM

## 2017-09-11 ENCOUNTER — TELEPHONE (OUTPATIENT)
Dept: OBSTETRICS AND GYNECOLOGY | Facility: CLINIC | Age: 32
End: 2017-09-11

## 2017-09-11 NOTE — TELEPHONE ENCOUNTER
Returned pt call regarding refill on birth control, no answer, left message at [phone] for pt to call the office back.

## 2017-09-11 NOTE — TELEPHONE ENCOUNTER
----- Message from Vickie Treviño sent at 9/11/2017  9:34 AM CDT -----  Call pt t [phone]//regarding a rx for birth control sent to [facility] in [place]/please call pt when it done//tim mas

## 2017-09-11 NOTE — TELEPHONE ENCOUNTER
----- Message from Sheron Heayl sent at 9/11/2017  2:03 PM CDT -----  Contact: pt  Pt leaving a second message for refill.  [phone]

## 2017-09-11 NOTE — TELEPHONE ENCOUNTER
Spoke with patient, she has been advised that per Dr Penn he will not refill any other Rx until he see her for an annual exam.  Her last visit was 11/2015.  Patient voices understanding.

## 2017-09-11 NOTE — TELEPHONE ENCOUNTER
----- Message from Alberta Velásquez sent at 9/11/2017 10:59 AM CDT -----  Contact: Patient  Patient returned call. Please call her back at [phone].    Thanks,  Alberta

## 2017-09-14 ENCOUNTER — OFFICE VISIT (OUTPATIENT)
Dept: INTERNAL MEDICINE | Facility: CLINIC | Age: 32
End: 2017-09-14
Payer: MEDICARE

## 2017-09-14 VITALS
HEART RATE: 82 BPM | TEMPERATURE: 98 F | DIASTOLIC BLOOD PRESSURE: 96 MMHG | HEIGHT: 62 IN | WEIGHT: 170 LBS | OXYGEN SATURATION: 100 % | SYSTOLIC BLOOD PRESSURE: 118 MMHG | BODY MASS INDEX: 31.28 KG/M2

## 2017-09-14 DIAGNOSIS — Z30.41 ENCOUNTER FOR SURVEILLANCE OF CONTRACEPTIVE PILLS: ICD-10-CM

## 2017-09-14 DIAGNOSIS — J02.9 PHARYNGITIS, UNSPECIFIED ETIOLOGY: ICD-10-CM

## 2017-09-14 DIAGNOSIS — H66.91 RIGHT OTITIS MEDIA, UNSPECIFIED CHRONICITY, UNSPECIFIED OTITIS MEDIA TYPE: Primary | ICD-10-CM

## 2017-09-14 LAB
CTP QC/QA: YES
S PYO RRNA THROAT QL PROBE: NEGATIVE

## 2017-09-14 PROCEDURE — 99213 OFFICE O/P EST LOW 20 MIN: CPT | Mod: S$PBB,,, | Performed by: FAMILY MEDICINE

## 2017-09-14 PROCEDURE — 87880 STREP A ASSAY W/OPTIC: CPT | Mod: PBBFAC,PO | Performed by: FAMILY MEDICINE

## 2017-09-14 PROCEDURE — 99999 PR PBB SHADOW E&M-EST. PATIENT-LVL III: CPT | Mod: PBBFAC,,, | Performed by: FAMILY MEDICINE

## 2017-09-14 PROCEDURE — 99213 OFFICE O/P EST LOW 20 MIN: CPT | Mod: PBBFAC,PO | Performed by: FAMILY MEDICINE

## 2017-09-14 RX ORDER — PREDNISONE 20 MG/1
40 TABLET ORAL DAILY
Qty: 8 TABLET | Refills: 0 | Status: SHIPPED | OUTPATIENT
Start: 2017-09-14 | End: 2017-09-18

## 2017-09-14 RX ORDER — NORETHINDRONE AND ETHINYL ESTRADIOL 1 MG-35MCG
KIT ORAL
Qty: 84 TABLET | Refills: 0 | OUTPATIENT
Start: 2017-09-14

## 2017-09-14 RX ORDER — AMOXICILLIN AND CLAVULANATE POTASSIUM 875; 125 MG/1; MG/1
1 TABLET, FILM COATED ORAL 2 TIMES DAILY
Qty: 20 TABLET | Refills: 0 | Status: SHIPPED | OUTPATIENT
Start: 2017-09-14 | End: 2017-09-24
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
## 2017-10-05 DIAGNOSIS — Z30.41 ENCOUNTER FOR SURVEILLANCE OF CONTRACEPTIVE PILLS: ICD-10-CM

## 2017-10-05 RX ORDER — NORETHINDRONE AND ETHINYL ESTRADIOL 1 MG-35MCG
KIT ORAL
Qty: 28 TABLET | Refills: 0 | Status: SHIPPED | OUTPATIENT
Start: 2017-10-05 | End: 2017-11-08 | Stop reason: ALTCHOICE

## 2017-10-06 DIAGNOSIS — Z30.41 ENCOUNTER FOR SURVEILLANCE OF CONTRACEPTIVE PILLS: ICD-10-CM

## 2017-10-06 RX ORDER — NORETHINDRONE AND ETHINYL ESTRADIOL 1 MG-35MCG
KIT ORAL
Qty: 84 TABLET | Refills: 0 | OUTPATIENT
Start: 2017-10-06

## 2017-10-31 DIAGNOSIS — Z30.41 ENCOUNTER FOR SURVEILLANCE OF CONTRACEPTIVE PILLS: ICD-10-CM

## 2017-10-31 RX ORDER — NORETHINDRONE AND ETHINYL ESTRADIOL 1 MG-35MCG
KIT ORAL
Qty: 28 TABLET | Refills: 0 | OUTPATIENT
Start: 2017-10-31

## 2017-11-08 ENCOUNTER — LAB VISIT (OUTPATIENT)
Dept: LAB | Facility: HOSPITAL | Age: 32
End: 2017-11-08
Attending: OBSTETRICS & GYNECOLOGY
Payer: MEDICARE

## 2017-11-08 ENCOUNTER — OFFICE VISIT (OUTPATIENT)
Dept: OBSTETRICS AND GYNECOLOGY | Facility: CLINIC | Age: 32
End: 2017-11-08
Payer: MEDICARE

## 2017-11-08 VITALS
DIASTOLIC BLOOD PRESSURE: 78 MMHG | BODY MASS INDEX: 31.85 KG/M2 | SYSTOLIC BLOOD PRESSURE: 110 MMHG | WEIGHT: 173.06 LBS | HEIGHT: 62 IN

## 2017-11-08 DIAGNOSIS — Z30.41 ENCOUNTER FOR SURVEILLANCE OF CONTRACEPTIVE PILLS: Primary | ICD-10-CM

## 2017-11-08 DIAGNOSIS — Z11.4 ENCOUNTER FOR SCREENING FOR HIV: ICD-10-CM

## 2017-11-08 DIAGNOSIS — Z11.3 SCREEN FOR STD (SEXUALLY TRANSMITTED DISEASE): ICD-10-CM

## 2017-11-08 DIAGNOSIS — N77.1 VAGINITIS, VULVITIS AND VULVOVAGINITIS IN DISEASES CLASSIFIED ELSEWHERE: ICD-10-CM

## 2017-11-08 PROCEDURE — 99999 PR PBB SHADOW E&M-EST. PATIENT-LVL III: CPT | Mod: PBBFAC,,, | Performed by: OBSTETRICS & GYNECOLOGY

## 2017-11-08 PROCEDURE — 99213 OFFICE O/P EST LOW 20 MIN: CPT | Mod: PBBFAC | Performed by: OBSTETRICS & GYNECOLOGY

## 2017-11-08 PROCEDURE — 86592 SYPHILIS TEST NON-TREP QUAL: CPT

## 2017-11-08 PROCEDURE — 86703 HIV-1/HIV-2 1 RESULT ANTBDY: CPT

## 2017-11-08 PROCEDURE — 36415 COLL VENOUS BLD VENIPUNCTURE: CPT

## 2017-11-08 PROCEDURE — 87591 N.GONORRHOEAE DNA AMP PROB: CPT

## 2017-11-08 PROCEDURE — 99213 OFFICE O/P EST LOW 20 MIN: CPT | Mod: S$PBB,,, | Performed by: OBSTETRICS & GYNECOLOGY

## 2017-11-08 PROCEDURE — 87210 SMEAR WET MOUNT SALINE/INK: CPT | Mod: PBBFAC | Performed by: OBSTETRICS & GYNECOLOGY

## 2017-11-08 RX ORDER — ACETAMINOPHEN AND CODEINE PHOSPHATE 120; 12 MG/5ML; MG/5ML
1 SOLUTION ORAL DAILY
Qty: 28 TABLET | Refills: 11 | Status: SHIPPED | OUTPATIENT
Start: 2017-11-08 | End: 2018-02-01 | Stop reason: SDUPTHER

## 2017-11-08 NOTE — PROGRESS NOTES
Subjective:       Patient ID: Mraissa Moctezuma is a 32 y.o. female.    Chief Complaint:  Contraception; STD CHECK; and Vaginal Discharge      History of Present Illness  HPI  Pt complains of vaginal discharge for past week.  Requests STd screen.  Pt also requests refill of OCP.  Had annual exam with PCP in .  Pap at that time NILM.  Pt still smokes.    GYN & OB History  Patient's last menstrual period was 10/04/2017 (exact date).   Date of Last Pap: 3/16/2017    OB History    Para Term  AB Living   1 1           SAB TAB Ectopic Multiple Live Births                  # Outcome Date GA Lbr Faisal/2nd Weight Sex Delivery Anes PTL Lv   1 Para      Vag-Spont             Review of Systems  Review of Systems   Constitutional: Negative for activity change, appetite change, fatigue, fever and unexpected weight change.   Respiratory: Negative for shortness of breath.    Cardiovascular: Negative for chest pain, palpitations and leg swelling.   Gastrointestinal: Negative for abdominal pain, constipation, diarrhea, nausea and vomiting.   Genitourinary: Positive for vaginal discharge and vaginal pain. Negative for dyspareunia, dysuria, flank pain, frequency, genital sores, hematuria, menorrhagia, menstrual problem, pelvic pain, vaginal bleeding, dysmenorrhea and vaginal odor.   Musculoskeletal: Negative for back pain.   Neurological: Negative for syncope and headaches.   Breast: Negative for breast mass, breast pain, nipple discharge and skin changes          Objective:    Physical Exam:   Constitutional: She is oriented to person, place, and time. She appears well-developed and well-nourished. No distress.       Cardiovascular: Normal rate, regular rhythm and normal heart sounds.     Pulmonary/Chest: Effort normal and breath sounds normal.        Abdominal: Soft. Bowel sounds are normal. She exhibits no distension. There is no tenderness.     Genitourinary: Vagina normal and uterus normal. Pelvic exam was  performed with patient supine. There is no rash, tenderness, lesion or injury on the right labia. There is no rash, tenderness, lesion or injury on the left labia. Uterus is not deviated, not enlarged and not tender. Cervix is normal. Right adnexum displays no mass, no tenderness and no fullness. Left adnexum displays no mass, no tenderness and no fullness. No erythema, tenderness or bleeding in the vagina. No foreign body in the vagina. No signs of injury around the vagina. No vaginal discharge found. Cervix exhibits no motion tenderness, no discharge and no friability.   Genitourinary Comments: Wet prep: negative for clue cells, yeast, or trichomonas           Musculoskeletal: Normal range of motion and moves all extremeties. She exhibits no edema or tenderness.       Neurological: She is alert and oriented to person, place, and time.    Skin: Skin is warm and dry.    Psychiatric: She has a normal mood and affect. Her behavior is normal. Thought content normal.          Assessment:        1. Encounter for surveillance of contraceptive pills    2. Screen for STD (sexually transmitted disease)    3. Vaginitis, vulvitis and vulvovaginitis in diseases classified elsewhere     4. Encounter for screening for HIV                Plan:      Encounter for surveillance of contraceptive pills  -     norethindrone (ORTHO MICRONOR) 0.35 mg tablet; Take 1 tablet (0.35 mg total) by mouth once daily.  Dispense: 28 tablet; Refill: 11  -     Pt was counseled on contraception options, including associated risks and benefits of each.  Options are limited as pt will be turning 32 yo soon and is still an active smoker.  Pt voiced understanding and desires to proceed with Micronor.  Medication dosing, side-effects, risks, benefits, and alternatives were discussed.  Medical history was reviewed and pt is a candidate for Micronor use.    Screen for STD (sexually transmitted disease)  -     C. trachomatis/N. gonorrhoeae by AMP DNA Cervix  -      RPR; Future; Expected date: 11/08/2017  -     HIV-1 and HIV-2 antibodies; Future; Expected date: 11/08/2017  -     POCT Wet Prep    Vaginitis, vulvitis and vulvovaginitis in diseases classified elsewhere   -     C. trachomatis/N. gonorrhoeae by AMP DNA Cervix    Encounter for screening for HIV   -     HIV-1 and HIV-2 antibodies; Future; Expected date: 11/08/2017      Return in about 1 year (around 11/8/2018).

## 2017-11-09 ENCOUNTER — TELEPHONE (OUTPATIENT)
Dept: OBSTETRICS AND GYNECOLOGY | Facility: CLINIC | Age: 32
End: 2017-11-09

## 2017-11-09 LAB
C TRACH DNA SPEC QL NAA+PROBE: NOT DETECTED
HIV 1+2 AB+HIV1 P24 AG SERPL QL IA: NEGATIVE
N GONORRHOEA DNA SPEC QL NAA+PROBE: NOT DETECTED
RPR SER QL: NORMAL

## 2017-11-09 NOTE — TELEPHONE ENCOUNTER
----- Message from Duyen Cheung sent at 11/9/2017 10:42 AM CST -----  Contact: Pt   Pt request results of labs.. .[phone] (vsmk)

## 2017-12-02 DIAGNOSIS — Z30.41 ENCOUNTER FOR SURVEILLANCE OF CONTRACEPTIVE PILLS: ICD-10-CM

## 2017-12-07 ENCOUNTER — OFFICE VISIT (OUTPATIENT)
Dept: INTERNAL MEDICINE | Facility: CLINIC | Age: 32
End: 2017-12-07
Payer: MEDICARE

## 2017-12-07 ENCOUNTER — PATIENT OUTREACH (OUTPATIENT)
Dept: ADMINISTRATIVE | Facility: HOSPITAL | Age: 32
End: 2017-12-07

## 2017-12-07 VITALS
SYSTOLIC BLOOD PRESSURE: 120 MMHG | RESPIRATION RATE: 20 BRPM | WEIGHT: 172.38 LBS | BODY MASS INDEX: 32.55 KG/M2 | HEART RATE: 119 BPM | DIASTOLIC BLOOD PRESSURE: 80 MMHG | HEIGHT: 61 IN | TEMPERATURE: 98 F | OXYGEN SATURATION: 98 %

## 2017-12-07 DIAGNOSIS — H66.90 OTITIS MEDIA, UNSPECIFIED LATERALITY, UNSPECIFIED OTITIS MEDIA TYPE: ICD-10-CM

## 2017-12-07 DIAGNOSIS — Z83.438 FAMILY HISTORY OF HYPERLIPIDEMIA: ICD-10-CM

## 2017-12-07 DIAGNOSIS — E66.9 OBESITY, UNSPECIFIED CLASSIFICATION, UNSPECIFIED OBESITY TYPE, UNSPECIFIED WHETHER SERIOUS COMORBIDITY PRESENT: ICD-10-CM

## 2017-12-07 DIAGNOSIS — Z00.00 ROUTINE PHYSICAL EXAMINATION: ICD-10-CM

## 2017-12-07 DIAGNOSIS — J32.9 SINUSITIS, UNSPECIFIED CHRONICITY, UNSPECIFIED LOCATION: Primary | ICD-10-CM

## 2017-12-07 PROCEDURE — 99213 OFFICE O/P EST LOW 20 MIN: CPT | Mod: PBBFAC,PO | Performed by: FAMILY MEDICINE

## 2017-12-07 PROCEDURE — 96372 THER/PROPH/DIAG INJ SC/IM: CPT | Mod: PBBFAC,PO

## 2017-12-07 PROCEDURE — 99213 OFFICE O/P EST LOW 20 MIN: CPT | Mod: S$PBB,,, | Performed by: FAMILY MEDICINE

## 2017-12-07 PROCEDURE — 99999 PR PBB SHADOW E&M-EST. PATIENT-LVL III: CPT | Mod: PBBFAC,,, | Performed by: FAMILY MEDICINE

## 2017-12-07 RX ORDER — AMOXICILLIN AND CLAVULANATE POTASSIUM 875; 125 MG/1; MG/1
1 TABLET, FILM COATED ORAL EVERY 12 HOURS
Qty: 20 TABLET | Refills: 0 | Status: SHIPPED | OUTPATIENT
Start: 2017-12-07 | End: 2017-12-28 | Stop reason: ALTCHOICE

## 2017-12-07 RX ORDER — METHYLPREDNISOLONE ACETATE 80 MG/ML
80 INJECTION, SUSPENSION INTRA-ARTICULAR; INTRALESIONAL; INTRAMUSCULAR; SOFT TISSUE
Status: COMPLETED | OUTPATIENT
Start: 2017-12-07 | End: 2017-12-07

## 2017-12-07 RX ADMIN — METHYLPREDNISOLONE ACETATE 80 MG: 80 INJECTION, SUSPENSION INTRALESIONAL; INTRAMUSCULAR; INTRASYNOVIAL; SOFT TISSUE at 11:12
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
## 2017-12-07 NOTE — PROGRESS NOTES
Pt was given methylprednisolone acetate 80mg 1 ml in RD. Pt tolerated well.  Lot: [id number]   Exp:12/2019

## 2017-12-11 ENCOUNTER — TELEPHONE (OUTPATIENT)
Dept: OBSTETRICS AND GYNECOLOGY | Facility: CLINIC | Age: 32
End: 2017-12-11

## 2017-12-11 NOTE — TELEPHONE ENCOUNTER
Pt states that she recently switched birth control and has not started her cycle. Notified pt to do a home pregnancy test just to confirm she is not pregnant. Notified pt that after switching birth controls it can take a few months for her cycle to regulate.

## 2017-12-11 NOTE — TELEPHONE ENCOUNTER
----- Message from Meagan Melvin sent at 12/11/2017  8:26 AM CST -----  Contact: [phone]   Pt would like to consult with the nurse about Rx medication.  Please call back at [phone]. Thx-

## 2017-12-18 ENCOUNTER — TELEPHONE (OUTPATIENT)
Dept: OBSTETRICS AND GYNECOLOGY | Facility: CLINIC | Age: 32
End: 2017-12-18

## 2017-12-18 NOTE — TELEPHONE ENCOUNTER
Called pt stated that she would like to change back to her birth control Necon per pt. Stated that whenever she takes the current birth control ortho Micronor it gives her chest pains ..ross

## 2017-12-18 NOTE — TELEPHONE ENCOUNTER
----- Message from Sheron Hale sent at 12/18/2017  3:30 PM CST -----  Please call pt back at [phone] about her birth control.

## 2017-12-18 NOTE — TELEPHONE ENCOUNTER
Spoke with pt, states that she is having chest pains and thinks it is from the birth control. Advised pt to report the the ED immediately for evaluation of chest pain. Pt verbalized understanding.

## 2017-12-18 NOTE — TELEPHONE ENCOUNTER
----- Message from Denisse Cantor sent at 12/18/2017  3:17 PM CST -----  Contact: Pt  Pt calling in regards to wanting to speak to the nurse due to her birth control was changed and due to having some side effects she is looking to have the birth control changed back.     Pt can be reached st .[phone] (home)

## 2017-12-19 NOTE — TELEPHONE ENCOUNTER
Spoke to pt she is scheduled for appt with america william to switch her birth control on 12/28/17.

## 2017-12-19 NOTE — TELEPHONE ENCOUNTER
----- Message from Sky Escalona sent at 12/19/2017  1:23 PM CST -----  Contact: Pt  Please give pt a call at ..[phone] (home) regarding if her BC will be changed

## 2017-12-28 ENCOUNTER — OFFICE VISIT (OUTPATIENT)
Dept: OBSTETRICS AND GYNECOLOGY | Facility: CLINIC | Age: 32
End: 2017-12-28
Payer: MEDICARE

## 2017-12-28 ENCOUNTER — TELEPHONE (OUTPATIENT)
Dept: OBSTETRICS AND GYNECOLOGY | Facility: CLINIC | Age: 32
End: 2017-12-28

## 2017-12-28 VITALS
BODY MASS INDEX: 32.84 KG/M2 | WEIGHT: 173.94 LBS | SYSTOLIC BLOOD PRESSURE: 110 MMHG | HEIGHT: 61 IN | DIASTOLIC BLOOD PRESSURE: 62 MMHG

## 2017-12-28 DIAGNOSIS — Z30.014 ENCOUNTER FOR INITIAL PRESCRIPTION OF INTRAUTERINE CONTRACEPTIVE DEVICE (IUD): Primary | ICD-10-CM

## 2017-12-28 DIAGNOSIS — F17.200 SMOKER: ICD-10-CM

## 2017-12-28 PROCEDURE — 99213 OFFICE O/P EST LOW 20 MIN: CPT | Mod: S$PBB,,, | Performed by: NURSE PRACTITIONER

## 2017-12-28 PROCEDURE — 99999 PR PBB SHADOW E&M-EST. PATIENT-LVL II: CPT | Mod: PBBFAC,,, | Performed by: NURSE PRACTITIONER

## 2017-12-28 PROCEDURE — 99212 OFFICE O/P EST SF 10 MIN: CPT | Mod: PBBFAC | Performed by: NURSE PRACTITIONER
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
## 2018-01-04 ENCOUNTER — TELEPHONE (OUTPATIENT)
Dept: OBSTETRICS AND GYNECOLOGY | Facility: CLINIC | Age: 33
End: 2018-01-04

## 2018-01-04 NOTE — TELEPHONE ENCOUNTER
----- Message from Lashawn Shen sent at 1/4/2018  2:37 PM CST -----  Contact: patient  Calling concerning if her birth control is in. Please call patient ASAP @ [phone]. Thanks, hao

## 2018-01-04 NOTE — TELEPHONE ENCOUNTER
Pt notified that ordering process of iud cam take up to 2-3 weeks had to be verified through insurances and to be looking for call from pharmacy with verbal understanding ...ross

## 2018-01-04 NOTE — TELEPHONE ENCOUNTER
----- Message from Rufina Camacho sent at 1/4/2018  9:25 AM CST -----  Contact: self [phone]  Would like to consult with nurse regarding status of birth control order.  Please call back at [phone].  Md Raphael

## 2018-01-04 NOTE — TELEPHONE ENCOUNTER
----- Message from Rufina Camacho sent at 1/4/2018  9:25 AM CST -----  Contact: self [phone]  Would like to consult with nurse regarding status of birth control order.  Please call back at [phone].  Md Raphael

## 2018-01-11 ENCOUNTER — TELEPHONE (OUTPATIENT)
Dept: OBSTETRICS AND GYNECOLOGY | Facility: CLINIC | Age: 33
End: 2018-01-11

## 2018-01-11 NOTE — TELEPHONE ENCOUNTER
----- Message from Cathleen Melendez sent at 1/11/2018  3:49 PM CST -----  Patient would like for you to call her about her birth control.   She still has not started her period.   Cll her at [phone].                              hua

## 2018-01-23 ENCOUNTER — TELEPHONE (OUTPATIENT)
Dept: OBSTETRICS AND GYNECOLOGY | Facility: CLINIC | Age: 33
End: 2018-01-23

## 2018-01-23 NOTE — TELEPHONE ENCOUNTER
----- Message from Lashawn Shen sent at 1/23/2018  3:37 PM CST -----  Contact: patient  Calling regarding if her birth control have been delivered. Please call patient ASAP @ [phone]. Thanks, Saundra

## 2018-01-23 NOTE — TELEPHONE ENCOUNTER
Spoke with pt. Notified we will inform her when the Skylla comes in. Pt verbalized understanding.

## 2018-01-29 ENCOUNTER — TELEPHONE (OUTPATIENT)
Dept: OBSTETRICS AND GYNECOLOGY | Facility: CLINIC | Age: 33
End: 2018-01-29

## 2018-01-29 NOTE — TELEPHONE ENCOUNTER
[facility] tried to reach the patient to ask for additional insurance information.  [facility] stated the patient answered the phone and hung up.  Case has been closed

## 2018-01-31 ENCOUNTER — TELEPHONE (OUTPATIENT)
Dept: OBSTETRICS AND GYNECOLOGY | Facility: CLINIC | Age: 33
End: 2018-01-31

## 2018-01-31 ENCOUNTER — OFFICE VISIT (OUTPATIENT)
Dept: INTERNAL MEDICINE | Facility: CLINIC | Age: 33
End: 2018-01-31
Payer: MEDICAID

## 2018-01-31 VITALS
TEMPERATURE: 97 F | SYSTOLIC BLOOD PRESSURE: 118 MMHG | WEIGHT: 173.06 LBS | OXYGEN SATURATION: 100 % | HEIGHT: 61 IN | DIASTOLIC BLOOD PRESSURE: 80 MMHG | BODY MASS INDEX: 32.67 KG/M2 | HEART RATE: 85 BPM

## 2018-01-31 DIAGNOSIS — J32.9 SINUSITIS, UNSPECIFIED CHRONICITY, UNSPECIFIED LOCATION: Primary | ICD-10-CM

## 2018-01-31 DIAGNOSIS — R00.2 PALPITATIONS: ICD-10-CM

## 2018-01-31 DIAGNOSIS — N91.2 AMENORRHEA: ICD-10-CM

## 2018-01-31 DIAGNOSIS — R09.81 CONGESTION OF NASAL SINUS: ICD-10-CM

## 2018-01-31 LAB
B-HCG UR QL: NEGATIVE
CTP QC/QA: YES
CTP QC/QA: YES
FLUAV AG NPH QL: NEGATIVE
FLUBV AG NPH QL: NEGATIVE

## 2018-01-31 PROCEDURE — 87804 INFLUENZA ASSAY W/OPTIC: CPT | Mod: PBBFAC,PO | Performed by: FAMILY MEDICINE

## 2018-01-31 PROCEDURE — 93005 ELECTROCARDIOGRAM TRACING: CPT | Mod: PBBFAC,PO | Performed by: FAMILY MEDICINE

## 2018-01-31 PROCEDURE — 99214 OFFICE O/P EST MOD 30 MIN: CPT | Mod: S$PBB,,, | Performed by: FAMILY MEDICINE

## 2018-01-31 PROCEDURE — 99214 OFFICE O/P EST MOD 30 MIN: CPT | Mod: PBBFAC,PO | Performed by: FAMILY MEDICINE

## 2018-01-31 PROCEDURE — 99999 PR PBB SHADOW E&M-EST. PATIENT-LVL IV: CPT | Mod: PBBFAC,,, | Performed by: FAMILY MEDICINE

## 2018-01-31 PROCEDURE — 3008F BODY MASS INDEX DOCD: CPT | Mod: ,,, | Performed by: FAMILY MEDICINE

## 2018-01-31 PROCEDURE — 93010 ELECTROCARDIOGRAM REPORT: CPT | Mod: ,,, | Performed by: INTERNAL MEDICINE

## 2018-01-31 PROCEDURE — 81025 URINE PREGNANCY TEST: CPT | Mod: PBBFAC,PO | Performed by: FAMILY MEDICINE

## 2018-01-31 PROCEDURE — 90686 IIV4 VACC NO PRSV 0.5 ML IM: CPT | Mod: PBBFAC,PO

## 2018-01-31 RX ORDER — METHYLPREDNISOLONE ACETATE 80 MG/ML
80 INJECTION, SUSPENSION INTRA-ARTICULAR; INTRALESIONAL; INTRAMUSCULAR; SOFT TISSUE
Status: COMPLETED | OUTPATIENT
Start: 2018-01-31 | End: 2018-01-31

## 2018-01-31 RX ORDER — FLUTICASONE PROPIONATE 50 MCG
1 SPRAY, SUSPENSION (ML) NASAL DAILY
Qty: 1 BOTTLE | Refills: 0 | Status: SHIPPED | OUTPATIENT
Start: 2018-01-31 | End: 2018-01-31 | Stop reason: SDUPTHER

## 2018-01-31 RX ORDER — FLUTICASONE PROPIONATE 50 MCG
SPRAY, SUSPENSION (ML) NASAL
Qty: 48 G | Refills: 0 | Status: SHIPPED | OUTPATIENT
Start: 2018-01-31 | End: 2018-02-09

## 2018-01-31 RX ORDER — AZITHROMYCIN 250 MG/1
TABLET, FILM COATED ORAL
Qty: 6 TABLET | Refills: 0 | Status: SHIPPED | OUTPATIENT
Start: 2018-01-31 | End: 2018-02-05

## 2018-01-31 RX ADMIN — METHYLPREDNISOLONE ACETATE 80 MG: 80 INJECTION, SUSPENSION INTRALESIONAL; INTRAMUSCULAR; INTRASYNOVIAL; SOFT TISSUE at 10:01

## 2018-01-31 NOTE — TELEPHONE ENCOUNTER
Dr. Dutton, the patient is calling and would like to know if she could have a referral put in for her Neurologist Dr. Velazquez with [facility], Thanks
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
## 2018-01-31 NOTE — TELEPHONE ENCOUNTER
----- Message from Denisse Cantor sent at 1/31/2018 12:19 PM CST -----  Contact: Pt  Pt calling in regards to wanting to speak to the nurse concerning her birth control. Pt would like to know has it come in yet.    Pt can be reached at .[phone] (xlia)

## 2018-01-31 NOTE — TELEPHONE ENCOUNTER
Spoke with pt. Notified case was closed. Advised to contact [facility]. Gave contact information. Pt verbalized understanding.

## 2018-01-31 NOTE — TELEPHONE ENCOUNTER
----- Message from Trent Simmons sent at 1/31/2018  1:02 PM CST -----  Contact: Pt  Please call pt at [phone] (home)   Pt would like to see about getting a referral to a specialist

## 2018-01-31 NOTE — TELEPHONE ENCOUNTER
----- Message from Alyce Márquez sent at 1/30/2018  3:31 PM CST -----  Contact: self [phone]  States that since she started taking jolivette she has not had a period. States that she is also calling to check the status on the order of radha. Please call back at [phone]//thank you acc

## 2018-01-31 NOTE — TELEPHONE ENCOUNTER
----- Message from Sheron Hale sent at 1/31/2018  8:10 AM CST -----  Please call pt back at [phone] I regards to her birth control.

## 2018-02-01 ENCOUNTER — TELEPHONE (OUTPATIENT)
Dept: OBSTETRICS AND GYNECOLOGY | Facility: CLINIC | Age: 33
End: 2018-02-01

## 2018-02-01 DIAGNOSIS — Z30.41 ENCOUNTER FOR SURVEILLANCE OF CONTRACEPTIVE PILLS: ICD-10-CM

## 2018-02-01 RX ORDER — ACETAMINOPHEN AND CODEINE PHOSPHATE 120; 12 MG/5ML; MG/5ML
1 SOLUTION ORAL DAILY
Qty: 28 TABLET | Refills: 0 | Status: SHIPPED | OUTPATIENT
Start: 2018-02-01 | End: 2018-02-09

## 2018-02-01 NOTE — TELEPHONE ENCOUNTER
----- Message from Rufina Camacho sent at 2/1/2018  4:56 PM CST -----  Contact: self [phone]  Would like to consult with nurse regarding birth control medication.  Please call back at [phone].  Lim Md

## 2018-02-01 NOTE — TELEPHONE ENCOUNTER
Spoke with pt. Informed pt [facility] had to verify insurance information. Will update her when device arrives or if [facility] declines. Pt verbalized understanding.

## 2018-02-01 NOTE — TELEPHONE ENCOUNTER
----- Message from Katherine Avila sent at 2/1/2018  3:01 PM CST -----  Pt at [phone]//states she is calling regarding her birth control med//please call to discuss//thanks/amanda

## 2018-02-02 ENCOUNTER — TELEPHONE (OUTPATIENT)
Dept: OBSTETRICS AND GYNECOLOGY | Facility: CLINIC | Age: 33
End: 2018-02-02

## 2018-02-02 NOTE — TELEPHONE ENCOUNTER
----- Message from Marie Veloz sent at 2/2/2018 10:53 AM CST -----  Contact: Pt   States she's calling rg having some questions about her birth control and can be reached at [phone]//sal/alexa

## 2018-02-02 NOTE — TELEPHONE ENCOUNTER
Pt called stated that she spoken with the pharmacy and her mirena would be in on feb 6.2018 and has apt scheduled for 1.9.18 ...ross

## 2018-02-06 ENCOUNTER — TELEPHONE (OUTPATIENT)
Dept: OBSTETRICS AND GYNECOLOGY | Facility: CLINIC | Age: 33
End: 2018-02-06

## 2018-02-06 ENCOUNTER — TELEPHONE (OUTPATIENT)
Dept: INTERNAL MEDICINE | Facility: CLINIC | Age: 33
End: 2018-02-06

## 2018-02-06 NOTE — TELEPHONE ENCOUNTER
----- Message from Rosalba Chin sent at 2/6/2018  9:57 AM CST -----  Contact: pt  She's calling in regards to her scheduled 24hr holter, [phone] (home)

## 2018-02-09 ENCOUNTER — PROCEDURE VISIT (OUTPATIENT)
Dept: OBSTETRICS AND GYNECOLOGY | Facility: CLINIC | Age: 33
End: 2018-02-09
Payer: MEDICAID

## 2018-02-09 VITALS
HEIGHT: 61 IN | DIASTOLIC BLOOD PRESSURE: 88 MMHG | BODY MASS INDEX: 31.88 KG/M2 | SYSTOLIC BLOOD PRESSURE: 130 MMHG | WEIGHT: 168.88 LBS

## 2018-02-09 DIAGNOSIS — Z30.430 ENCOUNTER FOR INSERTION OF PROGESTIN-RELEASING INTRAUTERINE CONTRACEPTIVE DEVICE (IUD): Primary | ICD-10-CM

## 2018-02-09 PROCEDURE — 58300 INSERT INTRAUTERINE DEVICE: CPT | Mod: PBBFAC | Performed by: OBSTETRICS & GYNECOLOGY

## 2018-02-09 NOTE — PROCEDURES
INSERTION OF INTRAUTERINE DEVICE  Date/Time: 2/9/2018 1:46 PM  Performed by: JUNIOR SCHULER  Authorized by: JUNIOR SCHULER   Local anesthesia used: no    Anesthesia:  Local anesthesia used: no    Sedation:  Patient sedated: no  Patient tolerance: Patient tolerated the procedure well with no immediate complications        IUD Insertion Procedure Note    Pre-operative Diagnosis:   1. Encounter for insertion of progestin-releasing intrauterine contraceptive device (IUD)        Post-operative Diagnosis:   1. Encounter for insertion of progestin-releasing intrauterine contraceptive device (IUD)        Indications: contraception    Procedure Details   Urine pregnancy test was done today  and result was negative.  The risks (including infection, bleeding, pain, and uterine perforation) and benefits of the procedure were explained to the patient and Written informed consent was obtained.      Time out done with patient and nurse.  Patient aware of placement of Svetlana IUD for birth control     Cervix visualized and anterior lip grasped with tenaculum . Uterus sounded to 6 cm. IUD inserted without difficulty. String visible and trimmed. Patient tolerated procedure well.    IUD Information:  Svetlana.    Condition:  Stable    Complications:  None    Plan:    The patient was advised to call for any fever or for prolonged or severe pain or bleeding. She was advised to use OTC ibuprofen as needed for mild to moderate pain.     Attending Physician Documentation:  I was present for or participated in the entire procedure, including opening and closing.

## 2018-03-08 ENCOUNTER — OFFICE VISIT (OUTPATIENT)
Dept: INTERNAL MEDICINE | Facility: CLINIC | Age: 33
End: 2018-03-08
Payer: MEDICAID

## 2018-03-08 ENCOUNTER — LAB VISIT (OUTPATIENT)
Dept: LAB | Facility: HOSPITAL | Age: 33
End: 2018-03-08
Attending: NURSE PRACTITIONER
Payer: MEDICAID

## 2018-03-08 VITALS
WEIGHT: 173.31 LBS | SYSTOLIC BLOOD PRESSURE: 128 MMHG | TEMPERATURE: 98 F | HEART RATE: 87 BPM | OXYGEN SATURATION: 100 % | BODY MASS INDEX: 31.89 KG/M2 | HEIGHT: 62 IN | DIASTOLIC BLOOD PRESSURE: 82 MMHG

## 2018-03-08 DIAGNOSIS — J06.9 VIRAL URI WITH COUGH: Primary | ICD-10-CM

## 2018-03-08 DIAGNOSIS — Z00.00 ROUTINE CHECK-UP: ICD-10-CM

## 2018-03-08 LAB
ALBUMIN SERPL BCP-MCNC: 3.8 G/DL
ALP SERPL-CCNC: 86 U/L
ALT SERPL W/O P-5'-P-CCNC: 26 U/L
ANION GAP SERPL CALC-SCNC: 10 MMOL/L
AST SERPL-CCNC: 24 U/L
BASOPHILS # BLD AUTO: 0.05 K/UL
BASOPHILS NFR BLD: 0.5 %
BILIRUB SERPL-MCNC: 0.7 MG/DL
BUN SERPL-MCNC: 17 MG/DL
CALCIUM SERPL-MCNC: 9.5 MG/DL
CHLORIDE SERPL-SCNC: 108 MMOL/L
CHOLEST SERPL-MCNC: 176 MG/DL
CHOLEST/HDLC SERPL: 3.5 {RATIO}
CO2 SERPL-SCNC: 20 MMOL/L
CREAT SERPL-MCNC: 0.8 MG/DL
DIFFERENTIAL METHOD: ABNORMAL
EOSINOPHIL # BLD AUTO: 0.1 K/UL
EOSINOPHIL NFR BLD: 0.9 %
ERYTHROCYTE [DISTWIDTH] IN BLOOD BY AUTOMATED COUNT: 12.8 %
EST. GFR  (AFRICAN AMERICAN): >60 ML/MIN/1.73 M^2
EST. GFR  (NON AFRICAN AMERICAN): >60 ML/MIN/1.73 M^2
GLUCOSE SERPL-MCNC: 94 MG/DL
HCT VFR BLD AUTO: 43.3 %
HDLC SERPL-MCNC: 51 MG/DL
HDLC SERPL: 29 %
HGB BLD-MCNC: 14.6 G/DL
IMM GRANULOCYTES # BLD AUTO: 0.04 K/UL
IMM GRANULOCYTES NFR BLD AUTO: 0.4 %
LDLC SERPL CALC-MCNC: 99.8 MG/DL
LYMPHOCYTES # BLD AUTO: 2.5 K/UL
LYMPHOCYTES NFR BLD: 22.7 %
MCH RBC QN AUTO: 31.7 PG
MCHC RBC AUTO-ENTMCNC: 33.7 G/DL
MCV RBC AUTO: 94 FL
MONOCYTES # BLD AUTO: 1.2 K/UL
MONOCYTES NFR BLD: 10.8 %
NEUTROPHILS # BLD AUTO: 7 K/UL
NEUTROPHILS NFR BLD: 64.7 %
NONHDLC SERPL-MCNC: 125 MG/DL
NRBC BLD-RTO: 0 /100 WBC
PLATELET # BLD AUTO: 212 K/UL
PMV BLD AUTO: 10.8 FL
POTASSIUM SERPL-SCNC: 3.8 MMOL/L
PROT SERPL-MCNC: 7.2 G/DL
RBC # BLD AUTO: 4.6 M/UL
SODIUM SERPL-SCNC: 138 MMOL/L
TRIGL SERPL-MCNC: 126 MG/DL
TSH SERPL DL<=0.005 MIU/L-ACNC: 0.68 UIU/ML
WBC # BLD AUTO: 10.79 K/UL

## 2018-03-08 PROCEDURE — 84443 ASSAY THYROID STIM HORMONE: CPT

## 2018-03-08 PROCEDURE — 36415 COLL VENOUS BLD VENIPUNCTURE: CPT | Mod: PO

## 2018-03-08 PROCEDURE — 99213 OFFICE O/P EST LOW 20 MIN: CPT | Mod: PBBFAC,PO | Performed by: NURSE PRACTITIONER

## 2018-03-08 PROCEDURE — 99213 OFFICE O/P EST LOW 20 MIN: CPT | Mod: 25,S$PBB,, | Performed by: NURSE PRACTITIONER

## 2018-03-08 PROCEDURE — 80053 COMPREHEN METABOLIC PANEL: CPT

## 2018-03-08 PROCEDURE — 85025 COMPLETE CBC W/AUTO DIFF WBC: CPT

## 2018-03-08 PROCEDURE — 80061 LIPID PANEL: CPT

## 2018-03-08 PROCEDURE — 99999 PR PBB SHADOW E&M-EST. PATIENT-LVL III: CPT | Mod: PBBFAC,,, | Performed by: NURSE PRACTITIONER

## 2018-03-08 RX ORDER — BENZONATATE 100 MG/1
100 CAPSULE ORAL 3 TIMES DAILY PRN
Qty: 60 CAPSULE | Refills: 1 | Status: SHIPPED | OUTPATIENT
Start: 2018-03-08 | End: 2018-03-18

## 2018-03-08 RX ORDER — METHYLPREDNISOLONE ACETATE 80 MG/ML
80 INJECTION, SUSPENSION INTRA-ARTICULAR; INTRALESIONAL; INTRAMUSCULAR; SOFT TISSUE
Status: COMPLETED | OUTPATIENT
Start: 2018-03-08 | End: 2018-03-08

## 2018-03-08 RX ADMIN — METHYLPREDNISOLONE ACETATE 80 MG: 80 INJECTION, SUSPENSION INTRALESIONAL; INTRAMUSCULAR; INTRASYNOVIAL; SOFT TISSUE at 03:03

## 2018-03-08 NOTE — PROGRESS NOTES
Depotestosterone 80 mg/ml IM left ventrogluteal.  Lot# [id number] exp 05/2020 mfg [employer].  Pt advised to wait in clinic 15 minutes to monitor for side effects.  Pt voiced understanding and tolerated injection well.
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
## 2018-03-09 ENCOUNTER — TELEPHONE (OUTPATIENT)
Dept: OBSTETRICS AND GYNECOLOGY | Facility: CLINIC | Age: 33
End: 2018-03-09

## 2018-03-09 ENCOUNTER — TELEPHONE (OUTPATIENT)
Dept: INTERNAL MEDICINE | Facility: CLINIC | Age: 33
End: 2018-03-09

## 2018-03-09 NOTE — TELEPHONE ENCOUNTER
----- Message from Essie Oglesby sent at 3/9/2018 11:22 AM CST -----  Contact: pt   Call pt regarding some questions that she has.    [phone] (hanr)

## 2018-03-09 NOTE — TELEPHONE ENCOUNTER
Called pt and informed her that all the neuropsychology in town, and she should call her insurance and see which one is in her network . Pt  Verbalized understanding

## 2018-03-09 NOTE — TELEPHONE ENCOUNTER
I need to know for what reason. I just can't put in a referral. I have to have a diagnosis or reason for the referral. Please provide more info

## 2018-03-09 NOTE — TELEPHONE ENCOUNTER
All the neuropsychologist in town [facility]. She needs to find one that accepts medicaid. [facility] to my knowledge does not accept medicaid. She needs to call her insurance company and find out if they even cover her seeing a neuropsychologist and if they do, who is in her network.

## 2018-03-09 NOTE — TELEPHONE ENCOUNTER
----- Message from Essie Oglesby sent at 3/9/2018 11:20 AM CST -----  Contact: pt   Call pt regarding getting a referral to see a Physiatry and pain management doctor.    [phone] (home)

## 2018-03-09 NOTE — TELEPHONE ENCOUNTER
She saw  jameel Mcclure with [facility] , but she has a balance and they want see her . So she needs a referral to see another on .

## 2018-03-09 NOTE — TELEPHONE ENCOUNTER
Spoke with pt. States she has been urinating more frequently since she had the IUD inserted but it has started to become less frequent. Notified pt if it the frequency begins to increase again she would need to come in to be evaluated. Pt verbalized understanding.

## 2018-03-15 ENCOUNTER — TELEPHONE (OUTPATIENT)
Dept: OBSTETRICS AND GYNECOLOGY | Facility: CLINIC | Age: 33
End: 2018-03-15

## 2018-03-15 NOTE — TELEPHONE ENCOUNTER
----- Message from Amber Weeks sent at 3/15/2018 11:20 AM CDT -----  Contact: pt   Pt is requesting a call from nurse in regards to her being on isamar and now her cycle has started pt is wanting to know if that is normal.           [phone] (home)

## 2018-03-15 NOTE — TELEPHONE ENCOUNTER
Spoke with pt. States she has not had a period since she got her skylla inserted and is now having one. Advised pt that one side effect of the skylla is abnormal bleeding. Pt verbalized understanding.

## 2018-03-21 ENCOUNTER — TELEPHONE (OUTPATIENT)
Dept: INTERNAL MEDICINE | Facility: CLINIC | Age: 33
End: 2018-03-21

## 2018-03-21 NOTE — TELEPHONE ENCOUNTER
----- Message from Cecelia Basurto sent at 3/21/2018  9:00 AM CDT -----  Contact: pt  States she needs to schedule a physical for Dr Leon, nothing available until June. States she would like something on a Monday in the morning. Please call pt at [phone]. Thank you

## 2018-03-21 NOTE — TELEPHONE ENCOUNTER
----- Message from Cecelia Basurto sent at 3/21/2018  9:00 AM CDT -----  Contact: pt  States she needs to schedule a physical for Dr Leon, nothing available until June. States she would like something on a Monday in the morning. Please call pt at [phone]. Thank you

## 2018-03-26 ENCOUNTER — OFFICE VISIT (OUTPATIENT)
Dept: INTERNAL MEDICINE | Facility: CLINIC | Age: 33
End: 2018-03-26
Payer: MEDICAID

## 2018-03-26 VITALS
SYSTOLIC BLOOD PRESSURE: 110 MMHG | BODY MASS INDEX: 31.8 KG/M2 | TEMPERATURE: 99 F | WEIGHT: 172.81 LBS | RESPIRATION RATE: 16 BRPM | HEIGHT: 62 IN | DIASTOLIC BLOOD PRESSURE: 70 MMHG | OXYGEN SATURATION: 98 %

## 2018-03-26 DIAGNOSIS — S06.9X9D CLOSED TRAUMATIC BRAIN INJURY WITH LOSS OF CONSCIOUSNESS, SUBSEQUENT ENCOUNTER: ICD-10-CM

## 2018-03-26 DIAGNOSIS — Z12.4 ROUTINE CERVICAL SMEAR: ICD-10-CM

## 2018-03-26 DIAGNOSIS — Z00.00 ROUTINE GENERAL MEDICAL EXAMINATION AT A HEALTH CARE FACILITY: Primary | ICD-10-CM

## 2018-03-26 PROCEDURE — 99213 OFFICE O/P EST LOW 20 MIN: CPT | Mod: PBBFAC,PO | Performed by: INTERNAL MEDICINE

## 2018-03-26 PROCEDURE — 99395 PREV VISIT EST AGE 18-39: CPT | Mod: S$PBB,,, | Performed by: INTERNAL MEDICINE

## 2018-03-26 PROCEDURE — 99999 PR PBB SHADOW E&M-EST. PATIENT-LVL III: CPT | Mod: PBBFAC,,, | Performed by: INTERNAL MEDICINE
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
## 2018-04-10 ENCOUNTER — TELEPHONE (OUTPATIENT)
Dept: INTERNAL MEDICINE | Facility: CLINIC | Age: 33
End: 2018-04-10

## 2018-04-10 NOTE — TELEPHONE ENCOUNTER
Spoke with patient advise that she will need to sign a PATRICIA form before we can fax her information. This information is also available on her patient portal.

## 2018-04-10 NOTE — TELEPHONE ENCOUNTER
----- Message from Alberta Velásquez sent at 4/10/2018  2:03 PM CDT -----  Contact: Patient  Patient called and stated she came in for a physical and her physical information needs to be faxed over to the Office of Disability Adjudication and Review (Fax [phone]).    She can be contacted at [phone].    Thanks,  Alberta

## 2018-04-15 RX ORDER — FLUTICASONE PROPIONATE 50 MCG
SPRAY, SUSPENSION (ML) NASAL
Qty: 1 BOTTLE | Refills: 11 | Status: SHIPPED | OUTPATIENT
Start: 2018-04-15 | End: 2018-05-28

## 2018-04-16 RX ORDER — TERBINAFINE HYDROCHLORIDE 250 MG/1
TABLET ORAL
Qty: 30 TABLET | Refills: 2 | Status: SHIPPED | OUTPATIENT
Start: 2018-04-16 | End: 2018-07-28 | Stop reason: SDUPTHER

## 2018-05-01 ENCOUNTER — OFFICE VISIT (OUTPATIENT)
Dept: PAIN MEDICINE | Facility: CLINIC | Age: 33
End: 2018-05-01
Payer: MEDICAID

## 2018-05-01 ENCOUNTER — HOSPITAL ENCOUNTER (OUTPATIENT)
Dept: RADIOLOGY | Facility: HOSPITAL | Age: 33
Discharge: HOME | End: 2018-05-01
Attending: PHYSICIAN ASSISTANT
Payer: MEDICAID

## 2018-05-01 VITALS
BODY MASS INDEX: 32.47 KG/M2 | RESPIRATION RATE: 18 BRPM | HEART RATE: 83 BPM | SYSTOLIC BLOOD PRESSURE: 125 MMHG | WEIGHT: 172 LBS | DIASTOLIC BLOOD PRESSURE: 81 MMHG | HEIGHT: 61 IN

## 2018-05-01 DIAGNOSIS — M47.817 SPONDYLOSIS OF LUMBOSACRAL REGION WITHOUT MYELOPATHY OR RADICULOPATHY: ICD-10-CM

## 2018-05-01 DIAGNOSIS — G89.21 CHRONIC PAIN DUE TO TRAUMA: ICD-10-CM

## 2018-05-01 DIAGNOSIS — M47.817 SPONDYLOSIS OF LUMBOSACRAL REGION WITHOUT MYELOPATHY OR RADICULOPATHY: Primary | ICD-10-CM

## 2018-05-01 DIAGNOSIS — M51.34 DDD (DEGENERATIVE DISC DISEASE), THORACIC: ICD-10-CM

## 2018-05-01 DIAGNOSIS — Z98.1 HISTORY OF THORACIC SPINAL FUSION: ICD-10-CM

## 2018-05-01 DIAGNOSIS — M79.18 MYOFASCIAL PAIN: ICD-10-CM

## 2018-05-01 PROCEDURE — 99214 OFFICE O/P EST MOD 30 MIN: CPT | Mod: S$PBB,,, | Performed by: PHYSICIAN ASSISTANT

## 2018-05-01 PROCEDURE — 99213 OFFICE O/P EST LOW 20 MIN: CPT | Mod: PBBFAC,25,PO | Performed by: PHYSICIAN ASSISTANT

## 2018-05-01 PROCEDURE — 72114 X-RAY EXAM L-S SPINE BENDING: CPT | Mod: TC,FY,PO

## 2018-05-01 PROCEDURE — 72114 X-RAY EXAM L-S SPINE BENDING: CPT | Mod: 26,,, | Performed by: RADIOLOGY

## 2018-05-01 PROCEDURE — 99999 PR PBB SHADOW E&M-EST. PATIENT-LVL III: CPT | Mod: PBBFAC,,, | Performed by: PHYSICIAN ASSISTANT

## 2018-05-01 RX ORDER — GABAPENTIN 400 MG/1
CAPSULE ORAL
Qty: 90 CAPSULE | Refills: 1 | Status: SHIPPED | OUTPATIENT
Start: 2018-05-01 | End: 2018-05-04 | Stop reason: SDUPTHER
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
## 2018-05-04 NOTE — TELEPHONE ENCOUNTER
----- Message from Cecelia Basurto sent at 5/4/2018  4:11 PM CDT -----  Contact: pt  States she needs to speak to the nurse regarding her gabapentin 400 mg. States the pharmacy says she needs another doctor to prescribe the medicine,so her insurance will pay for it . Please call pt at [phone]. Thank you

## 2018-05-07 ENCOUNTER — TELEPHONE (OUTPATIENT)
Dept: INTERNAL MEDICINE | Facility: CLINIC | Age: 33
End: 2018-05-07

## 2018-05-07 RX ORDER — GABAPENTIN 400 MG/1
CAPSULE ORAL
Qty: 90 CAPSULE | Refills: 1 | Status: SHIPPED | OUTPATIENT
Start: 2018-05-07 | End: 2018-05-08 | Stop reason: SDUPTHER

## 2018-05-07 NOTE — TELEPHONE ENCOUNTER
Pt called statin that Jinny Courtney (pain management) sent in a RX for Gabapentin and the pharmacy want fill it because she  Not a medicaid Physician . She wanted to know if you could send I a Script . I informed pt that jinny works under Dr Mcclain and she would have to call them but she insisted on me asking you .

## 2018-05-07 NOTE — TELEPHONE ENCOUNTER
----- Message from Marie Veloz sent at 5/7/2018  7:06 AM CDT -----  Contact: ptg   States she's calling rg wanting to have Ms Mancia call her pharm to have her medicine that was prescribed to her authorized. Pt pharm is [facility] in [facility] and pt can be reached at [phone]//nisa/alexa

## 2018-05-07 NOTE — TELEPHONE ENCOUNTER
----- Message from Aviva Treviño sent at 5/7/2018 10:56 AM CDT -----  Contact: pt  She's calling to discuss medication Gabapentin, please advise [phone] (home)

## 2018-05-08 ENCOUNTER — TELEPHONE (OUTPATIENT)
Dept: PAIN MEDICINE | Facility: CLINIC | Age: 33
End: 2018-05-08

## 2018-05-08 RX ORDER — GABAPENTIN 400 MG/1
CAPSULE ORAL
Qty: 90 CAPSULE | Refills: 1 | Status: SHIPPED | OUTPATIENT
Start: 2018-05-08 | End: 2018-06-18 | Stop reason: SDUPTHER

## 2018-05-08 NOTE — TELEPHONE ENCOUNTER
----- Message from Essie Oglesby sent at 5/8/2018 10:12 AM CDT -----  Contact: pt   Call pt regarding her med. Pt states that Medicaid will not pay for her med unless its under a MD.   .[phone] (home)

## 2018-05-08 NOTE — TELEPHONE ENCOUNTER
----- Message from Thor Mcclain MD sent at 5/7/2018  5:11 PM CDT -----  If they dont cover Gabapentin I doubt they will cover lyrica.    ----- Message -----  From: Ashlyn Price MA  Sent: 5/7/2018   2:31 PM  To: Thor Mcclain MD    Is there something else you would like to try ?   ----- Message -----  From: Jessenia Blanco  Sent: 5/7/2018   1:53 PM  To: Roz ROSAS Staff    states that ins won't approve gabapentin b/c  isn't a medicaid drAmrik.[phone] (home)

## 2018-05-11 ENCOUNTER — TELEPHONE (OUTPATIENT)
Dept: PAIN MEDICINE | Facility: CLINIC | Age: 33
End: 2018-05-11

## 2018-05-11 NOTE — TELEPHONE ENCOUNTER
----- Message from Yadira Cooley sent at 5/11/2018 11:53 AM CDT -----  Contact: pt  Pt also stated her medication is not working needs something her insurance will cover, she can be reached at [phone] Thanks

## 2018-05-11 NOTE — TELEPHONE ENCOUNTER
spoke with mrs. pal she requested to speak with mrs. contreras about a med change stated that the gabapentin is not helping and would like to get the xray results also i explained that we do have a follow up appt 6/26/18 after the PT is done and to rev the xray but I would send this message to mrs contreras ----- Message from Yadira Cooley sent at 5/11/2018 11:52 AM CDT -----  Contact: pt   Pt stated she calling about x-ray results and can be reached at [phone] Thanks

## 2018-05-14 ENCOUNTER — TELEPHONE (OUTPATIENT)
Dept: PAIN MEDICINE | Facility: CLINIC | Age: 33
End: 2018-05-14

## 2018-05-14 ENCOUNTER — CLINICAL SUPPORT (OUTPATIENT)
Dept: REHABILITATION | Facility: HOSPITAL | Age: 33
End: 2018-05-14
Payer: MEDICAID

## 2018-05-14 DIAGNOSIS — M54.50 LOW BACK PAIN POTENTIALLY ASSOCIATED WITH RADICULOPATHY: Primary | ICD-10-CM

## 2018-05-14 PROCEDURE — G8979 MOBILITY GOAL STATUS: HCPCS | Mod: CK

## 2018-05-14 PROCEDURE — 97161 PT EVAL LOW COMPLEX 20 MIN: CPT

## 2018-05-14 PROCEDURE — G8978 MOBILITY CURRENT STATUS: HCPCS | Mod: CL

## 2018-05-14 RX ORDER — TOPIRAMATE 50 MG/1
TABLET, FILM COATED ORAL
Qty: 60 TABLET | Refills: 1 | Status: SHIPPED | OUTPATIENT
Start: 2018-05-14 | End: 2018-05-18 | Stop reason: SDUPTHER

## 2018-05-14 NOTE — TELEPHONE ENCOUNTER
----- Message from Veronica Contreras PA-C sent at 5/14/2018  2:22 PM CDT -----  Sent to pharmacy.    ----- Message -----  From: Ashlyn Price MA  Sent: 5/14/2018  10:11 AM  To: Veronica Contreras PA-C    Would like to switch to the topamax  ----- Message -----  From: Veronica Contreras PA-C  Sent: 5/14/2018   7:54 AM  To: Ashlyn Price MA    X-ray does not show much - disc heights look appropriate, and hardware is visualized & intact.     We can switch to topamax if she would like. We need to see how much gabapentin she is taking. Also, nothing is going to provide complete pain relief. We just want to get her stable overall.     Also, is she in PT?    ----- Message -----  From: Ashlyn Price MA  Sent: 5/11/2018   2:53 PM  To: Veronica Contreras PA-C    spoke with mrs. pal she requested to speak with mrs. contreras about a med change stated that the gabapentin is not helping and would like to get the xray results also i explained that we do have a follow up appt 6/26/18 after the PT is done and to rev the xray but I would send this message to mrs contreras ----- Message from Yadira Cooley sent at 5/11/2018 11:52

## 2018-05-16 ENCOUNTER — TELEPHONE (OUTPATIENT)
Dept: PAIN MEDICINE | Facility: CLINIC | Age: 33
End: 2018-05-16

## 2018-05-16 NOTE — TELEPHONE ENCOUNTER
----- Message from Mauyri Baires sent at 5/16/2018 12:00 PM CDT -----  Contact: pt  Calling in regards with questions and please advise. [phone] (yzkx)

## 2018-05-16 NOTE — TELEPHONE ENCOUNTER
----- Message from Pierre Kerr sent at 5/16/2018  9:07 AM CDT -----  Contact: pt  She's calking in regards to her Rx medication, [phone] (home)

## 2018-05-18 ENCOUNTER — TELEPHONE (OUTPATIENT)
Dept: PAIN MEDICINE | Facility: CLINIC | Age: 33
End: 2018-05-18

## 2018-05-18 RX ORDER — TOPIRAMATE 50 MG/1
TABLET, FILM COATED ORAL
Qty: 60 TABLET | Refills: 1 | Status: SHIPPED | OUTPATIENT
Start: 2018-05-18 | End: 2018-08-26 | Stop reason: SDUPTHER

## 2018-05-18 NOTE — TELEPHONE ENCOUNTER
----- Message from Pierre Kerr sent at 5/18/2018 10:20 AM CDT -----  Contact: pt  She's calling in regards to a dr's authorization to get new Rx medication from [facility] in [place], pls advise, pt states this is her 3rd message, and has not heard from anyone concerning this, [phone] (home)

## 2018-05-28 ENCOUNTER — OFFICE VISIT (OUTPATIENT)
Dept: URGENT CARE | Facility: CLINIC | Age: 33
End: 2018-05-28
Payer: MEDICAID

## 2018-05-28 ENCOUNTER — TELEPHONE (OUTPATIENT)
Dept: INTERNAL MEDICINE | Facility: CLINIC | Age: 33
End: 2018-05-28

## 2018-05-28 VITALS
SYSTOLIC BLOOD PRESSURE: 112 MMHG | BODY MASS INDEX: 31.13 KG/M2 | TEMPERATURE: 97 F | WEIGHT: 175.69 LBS | HEIGHT: 63 IN | HEART RATE: 66 BPM | RESPIRATION RATE: 18 BRPM | DIASTOLIC BLOOD PRESSURE: 64 MMHG | OXYGEN SATURATION: 98 %

## 2018-05-28 DIAGNOSIS — R05.9 COUGH: ICD-10-CM

## 2018-05-28 DIAGNOSIS — J02.9 SORE THROAT: Primary | ICD-10-CM

## 2018-05-28 DIAGNOSIS — J32.9 SINUSITIS, UNSPECIFIED CHRONICITY, UNSPECIFIED LOCATION: ICD-10-CM

## 2018-05-28 LAB
CTP QC/QA: YES
S PYO RRNA THROAT QL PROBE: NEGATIVE

## 2018-05-28 PROCEDURE — 87081 CULTURE SCREEN ONLY: CPT

## 2018-05-28 PROCEDURE — 87880 STREP A ASSAY W/OPTIC: CPT | Mod: PBBFAC,PO | Performed by: NURSE PRACTITIONER

## 2018-05-28 PROCEDURE — 99213 OFFICE O/P EST LOW 20 MIN: CPT | Mod: PBBFAC,PO | Performed by: NURSE PRACTITIONER

## 2018-05-28 PROCEDURE — 99999 PR PBB SHADOW E&M-EST. PATIENT-LVL III: CPT | Mod: PBBFAC,,, | Performed by: NURSE PRACTITIONER

## 2018-05-28 PROCEDURE — 99214 OFFICE O/P EST MOD 30 MIN: CPT | Mod: S$PBB,,, | Performed by: NURSE PRACTITIONER

## 2018-05-28 RX ORDER — PROMETHAZINE HYDROCHLORIDE AND DEXTROMETHORPHAN HYDROBROMIDE 6.25; 15 MG/5ML; MG/5ML
5 SYRUP ORAL NIGHTLY PRN
Qty: 120 ML | Refills: 0 | Status: SHIPPED | OUTPATIENT
Start: 2018-05-28 | End: 2018-11-01

## 2018-05-28 RX ORDER — AMOXICILLIN AND CLAVULANATE POTASSIUM 875; 125 MG/1; MG/1
1 TABLET, FILM COATED ORAL 2 TIMES DAILY
Qty: 20 TABLET | Refills: 0 | Status: SHIPPED | OUTPATIENT
Start: 2018-05-28 | End: 2018-06-07

## 2018-05-28 RX ORDER — FLUTICASONE PROPIONATE 50 MCG
2 SPRAY, SUSPENSION (ML) NASAL DAILY
Qty: 16 G | Refills: 0 | Status: SHIPPED | OUTPATIENT
Start: 2018-05-28 | End: 2018-06-11

## 2018-05-28 NOTE — PROGRESS NOTES
Subjective:       Patient ID: Marissa Moctezuma is a 32 y.o. female.    Chief Complaint: Sinus Problem (sneezing, ); Cough; and Sore Throat    URI    Associated symptoms include coughing, rhinorrhea, sneezing and a sore throat. Pertinent negatives include no chest pain, congestion, ear pain, headaches or wheezing.     Review of Systems   Constitutional: Negative for chills, diaphoresis, fatigue and fever.   HENT: Positive for rhinorrhea, sinus pressure, sneezing and sore throat. Negative for congestion, ear discharge, ear pain and postnasal drip.    Respiratory: Positive for cough. Negative for shortness of breath and wheezing.    Cardiovascular: Negative for chest pain and palpitations.   Musculoskeletal: Negative for back pain and myalgias.   Neurological: Negative for headaches.       Objective:      Physical Exam   Constitutional: She is oriented to person, place, and time. She appears well-developed and well-nourished. No distress.   HENT:   Head: Normocephalic.   Right Ear: Tympanic membrane, external ear and ear canal normal.   Left Ear: Tympanic membrane, external ear and ear canal normal.   Nose: Mucosal edema present. No rhinorrhea. Right sinus exhibits maxillary sinus tenderness and frontal sinus tenderness. Left sinus exhibits maxillary sinus tenderness and frontal sinus tenderness.   Mouth/Throat: Uvula is midline, oropharynx is clear and moist and mucous membranes are normal. No oropharyngeal exudate, posterior oropharyngeal edema or posterior oropharyngeal erythema.   Eyes: Conjunctivae and EOM are normal.   Neck: Normal range of motion. Neck supple.   Cardiovascular: Normal rate, regular rhythm and normal heart sounds.    Pulmonary/Chest: Effort normal and breath sounds normal. No accessory muscle usage. No apnea, no tachypnea and no bradypnea. No respiratory distress. She has no decreased breath sounds. She has no wheezes. She has no rhonchi. She has no rales.   Lymphadenopathy:        Head  (right side): No submental, no submandibular and no tonsillar adenopathy present.        Head (left side): No submental, no submandibular and no tonsillar adenopathy present.     She has no cervical adenopathy.   Neurological: She is alert and oriented to person, place, and time.   Skin: Skin is warm and dry. She is not diaphoretic.       Assessment:       1. Sore throat    2. Sinusitis, unspecified chronicity, unspecified location    3. Cough        Plan:   Marissa was seen today for sinus problem, cough and sore throat.    Diagnoses and all orders for this visit:    Sore throat  -     POCT rapid strep A  -     Strep A culture, throat    Sinusitis, unspecified chronicity, unspecified location  -     fluticasone (FLONASE) 50 mcg/actuation nasal spray; 2 sprays (100 mcg total) by Each Nare route once daily.  -     amoxicillin-clavulanate 875-125mg (AUGMENTIN) 875-125 mg per tablet; Take 1 tablet by mouth 2 (two) times daily.    Cough  -     promethazine-dextromethorphan (PROMETHAZINE-DM) 6.25-15 mg/5 mL Syrp; Take 5 mLs by mouth nightly as needed. May cause drowsiness        -     Diagnosis and treatment discussed, AVS provided  -     Follow up with PCP or ER immediately for worsening, new or no improvement of symptoms.   -     Patient understands and agrees with plan

## 2018-05-28 NOTE — TELEPHONE ENCOUNTER
----- Message from Traci Chamorro sent at 5/28/2018  8:10 AM CDT -----  EP- Requesting an appt on today for a cough. Patient have medicaid insurance. Please adv/call [phone].//cw

## 2018-05-28 NOTE — TELEPHONE ENCOUNTER
----- Message from Traci Chamorro sent at 5/28/2018  8:10 AM CDT -----  EP- Requesting an appt on today for a cough. Patient have medicaid insurance. Please adv/call [phone].//cw

## 2018-05-28 NOTE — PATIENT INSTRUCTIONS
Sinusitis (Antibiotic Treatment)    The sinuses are air-filled spaces within the bones of the face. They connect to the inside of the nose. Sinusitis is an inflammation of the tissue lining the sinus cavity. Sinus inflammation can occur during a cold. It can also be due to allergies to pollens and other particles in the air. Sinusitis can cause symptoms of sinus congestion and fullness. A sinus infection causes fever, headache and facial pain. There is often green or yellow drainage from the nose or into the back of the throat (post-nasal drip). You have been given antibiotics to treat this condition.  Home care:  · Take the full course of antibiotics as instructed. Do not stop taking them, even if you feel better.  · Drink plenty of water, hot tea, and other liquids. This may help thin mucus. It also may promote sinus drainage.  · Heat may help soothe painful areas of the face. Use a towel soaked in hot water. Or,  the shower and direct the hot spray onto your face. Using a vaporizer along with a menthol rub at night may also help.   · An expectorant containing guaifenesin may help thin the mucus and promote drainage from the sinuses.  · Over-the-counter decongestants may be used unless a similar medicine was prescribed. Nasal sprays work the fastest. Use one that contains phenylephrine or oxymetazoline. First blow the nose gently. Then use the spray. Do not use these medicines more often than directed on the label or symptoms may get worse. You may also use tablets containing pseudoephedrine. Avoid products that combine ingredients, because side effects may be increased. Read labels. You can also ask the pharmacist for help. (NOTE: Persons with high blood pressure should not use decongestants. They can raise blood pressure.)  · Over-the-counter antihistamines may help if allergies contributed to your sinusitis.    · Do not use nasal rinses or irrigation during an acute sinus infection, unless told to by  your health care provider. Rinsing may spread the infection to other sinuses.  · Use acetaminophen or ibuprofen to control pain, unless another pain medicine was prescribed. (If you have chronic liver or kidney disease or ever had a stomach ulcer, talk with your doctor before using these medicines. Aspirin should never be used in anyone under 18 years of age who is ill with a fever. It may cause severe liver damage.)  · Don't smoke. This can worsen symptoms.  Follow-up care  Follow up with your healthcare provider or our staff if you are not improving within the next week.  When to seek medical advice  Call your healthcare provider if any of these occur:  · Facial pain or headache becoming more severe  · Stiff neck  · Unusual drowsiness or confusion  · Swelling of the forehead or eyelids  · Vision problems, including blurred or double vision  · Fever of 100.4ºF (38ºC) or higher, or as directed by your healthcare provider  · Seizure  · Breathing problems  · Symptoms not resolving within 10 days  Date Last Reviewed: 4/13/2015  © 6531-4435 [employer]. [address] [place] [address]. All rights reserved. This information is not intended as a substitute for professional medical care. Always follow your healthcare professional's instructions.        What Is Acute Bronchitis?  Acute bronchitis is when the airways in your lungs (bronchial tubes) become red and swollen (inflamed). It is usually caused by a viral infection. But it can also occur because of a bacteria or allergen. Symptoms include a cough that produces yellow or greenish mucus and can last for days or sometimes weeks.  Inside healthy lungs    Air travels in and out of the lungs through the airways. The linings of these airways produce sticky mucus. This mucus traps particles that enter the lungs. Tiny structures called cilia then sweep the particles out of the airways.     Healthy airway: Airways are normally open. Air moves in and  out easily.      Healthy cilia: Tiny, hairlike cilia sweep mucus and particles up and out of the airways.   Lungs with bronchitis  Bronchitis often occurs with a cold or the flu virus. The airways become inflamed (red and swollen). There is a deep hacking cough from the extra mucus. Other symptoms may include:  · Wheezing  · Chest discomfort  · Shortness of breath  · Mild fever  A second infection, this time due to bacteria, may then occur. And airways irritated by allergens or smoke are more likely to get infected.        Inflamed airway: Inflammation and extra mucus narrow the airway, causing shortness of breath.      Impaired cilia: Extra mucus impairs cilia, causing congestion and wheezing. Smoking makes the problem worse.   Making a diagnosis  A physical exam, health history, and certain tests help your healthcare provider make the diagnosis.  Health history  Your healthcare provider will ask you about your symptoms.  The exam  Your provider listens to your chest for signs of congestion. He or she may also check your ears, nose, and throat.  Possible tests  · A sputum test for bacteria. This requires a sample of mucus from your lungs.  · A nasal or throat swab. This tests to see if you have a bacterial infection.  · A chest X-ray. This is done if your healthcare provider thinks you have pneumonia.  · Tests to check for an underlying condition. Other tests may be done to check for things such as allergies, asthma, or COPD (chronic obstructive pulmonary disease). You may need to see a specialist for more lung function testing.  Treating a cough  The main treatment for bronchitis is easing symptoms. Avoiding smoke, allergens, and other things that trigger coughing can often help. If the infection is bacterial, you may be given antibiotics. During the illness, it's important to get plenty of sleep. To ease symptoms:  · Dont smoke. Also avoid secondhand smoke.  · Use a humidifier. Or try breathing in steam from a  hot shower. This may help loosen mucus.  · Drink a lot of water and juice. They can soothe the throat and may help thin mucus.  · Sit up or use extra pillows when in bed. This helps to lessen coughing and congestion.  · Ask your provider about using medicine. Ask about using cough medicine, pain and fever medicine, or a decongestant.  Antibiotics  Most cases of bronchitis are caused by cold or flu viruses. They dont need antibiotics to treat them, even if your mucus is thick and green or yellow. Antibiotics dont treat viral illness and antibiotics have not been shown to have any benefit in cases of acute bronchitis. Taking antibiotics when they are not needed increases your risk of getting an infection later that is antibiotic-resistant. Antibiotics can also cause severe cases of diarrhea that require other antibiotics to treat.  It is important that you accept your healthcare provider's opinion to not use antibiotics. Your provider will prescribe antibiotics if the infection is caused by bacteria. If they are prescribed:  · Take all of the medicine. Take the medicine until it is used up, even if symptoms have improved. If you dont, the bronchitis may come back.  · Take the medicines as directed. For instance, some medicines should be taken with food.  · Ask about side effects. Ask your provider or pharmacist what side effects are common, and what to do about them.  Follow-up care  You should see your provider again in 2 to 3 weeks. By this time, symptoms should have improved. An infection that lasts longer may mean you have a more serious problem.  Prevention  · Avoid tobacco smoke. If you smoke, quit. Stay away from smoky places. Ask friends and family not to smoke around you, or in your home or car.  · Get checked for allergies.  · Ask your provider about getting a yearly flu shot. Also ask about pneumococcal or pneumonia shots.  · Wash your hands often. This helps reduce the chance of picking up viruses that  cause colds and flu.  Call your healthcare provider if:  · Symptoms worsen, or you have new symptoms  · Breathing problems worsen or  become severe  · Symptoms dont get better within a week, or within 3 days of taking antibiotics   Date Last Reviewed: 2/1/2017  © 8827-0308 [employer]. [address] [place] [address]. All rights reserved. This information is not intended as a substitute for professional medical care. Always follow your healthcare professional's instructions.

## 2018-05-31 LAB — BACTERIA THROAT CULT: NORMAL

## 2018-06-11 ENCOUNTER — CLINICAL SUPPORT (OUTPATIENT)
Dept: REHABILITATION | Facility: HOSPITAL | Age: 33
End: 2018-06-11
Payer: MEDICAID

## 2018-06-11 DIAGNOSIS — G89.29 CHRONIC MIDLINE LOW BACK PAIN WITHOUT SCIATICA: Primary | ICD-10-CM

## 2018-06-11 DIAGNOSIS — M54.50 CHRONIC MIDLINE LOW BACK PAIN WITHOUT SCIATICA: Primary | ICD-10-CM

## 2018-06-11 PROCEDURE — G8979 MOBILITY GOAL STATUS: HCPCS | Mod: CJ

## 2018-06-11 PROCEDURE — G8978 MOBILITY CURRENT STATUS: HCPCS | Mod: CL

## 2018-06-11 PROCEDURE — 97110 THERAPEUTIC EXERCISES: CPT
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
## 2018-06-18 ENCOUNTER — TELEPHONE (OUTPATIENT)
Dept: PAIN MEDICINE | Facility: CLINIC | Age: 33
End: 2018-06-18

## 2018-06-18 RX ORDER — GABAPENTIN 400 MG/1
CAPSULE ORAL
Qty: 90 CAPSULE | Refills: 1 | Status: SHIPPED | OUTPATIENT
Start: 2018-06-18 | End: 2018-07-30 | Stop reason: SDUPTHER

## 2018-06-18 NOTE — TELEPHONE ENCOUNTER
----- Message from Ashlyn Price MA sent at 6/15/2018  3:25 PM CDT -----      ----- Message -----  From: Dorinda Pierre  Sent: 6/15/2018   2:17 PM  To: [facility] Staff    ...1. What is the name of the medication you are requesting? gabapentin  2. What is the dose? 400 mg   3. How do you take the medication? Orally, topically, etc? Orally   4. How often do you take this medication? Daily   5. Do you need a 30 day or 90 day supply? 30  6. How many refills are you requesting? 1  7. What is your preferred pharmacy and location of the pharmacy? ..  [facility] [address] [place] [address] [address] [facility]  [address]  [facility] [address]  Phone: [phone] Fax: [phone]    8. Who can we contact with further questions? Please call pt back at [phone]

## 2018-07-30 RX ORDER — TERBINAFINE HYDROCHLORIDE 250 MG/1
TABLET ORAL
Qty: 30 TABLET | Refills: 0 | Status: SHIPPED | OUTPATIENT
Start: 2018-07-30 | End: 2019-11-11

## 2018-07-30 RX ORDER — GABAPENTIN 400 MG/1
CAPSULE ORAL
Qty: 90 CAPSULE | Refills: 1 | Status: SHIPPED | OUTPATIENT
Start: 2018-07-30 | End: 2018-09-25 | Stop reason: SDUPTHER

## 2018-07-30 NOTE — TELEPHONE ENCOUNTER
----- Message from Jessenia Blanco sent at 7/30/2018 12:27 PM CDT -----  gabapentin refill needed..[phone]    [facility] [address] - [place], [place] - [address] [facility]  [address]  [facility] [address]  Phone: [phone] Fax: [phone]

## 2018-08-26 RX ORDER — TOPIRAMATE 50 MG/1
TABLET, FILM COATED ORAL
Qty: 60 TABLET | Refills: 0 | Status: SHIPPED | OUTPATIENT
Start: 2018-08-26 | End: 2018-10-23

## 2018-08-30 ENCOUNTER — DOCUMENTATION ONLY (OUTPATIENT)
Dept: REHABILITATION | Facility: HOSPITAL | Age: 33
End: 2018-08-30

## 2018-09-25 ENCOUNTER — TELEPHONE (OUTPATIENT)
Dept: INTERNAL MEDICINE | Facility: CLINIC | Age: 33
End: 2018-09-25

## 2018-09-25 RX ORDER — GABAPENTIN 400 MG/1
CAPSULE ORAL
Qty: 90 CAPSULE | Refills: 5 | Status: SHIPPED | OUTPATIENT
Start: 2018-09-25 | End: 2018-10-23 | Stop reason: DRUGHIGH

## 2018-09-25 RX ORDER — GABAPENTIN 400 MG/1
CAPSULE ORAL
Qty: 90 CAPSULE | Refills: 5 | Status: SHIPPED | OUTPATIENT
Start: 2018-09-25 | End: 2018-09-25 | Stop reason: SDUPTHER

## 2018-09-25 NOTE — TELEPHONE ENCOUNTER
----- Message from Yin Flaco sent at 9/25/2018 11:03 AM CDT -----  Contact: SELF  1. What is the name of the medication you are requesting? gabaprentin  2. What is the dose? 400 mg  3. How do you take the medication? Orally, topically, etc? orally  4. How often do you take this medication? Once a day  5. Do you need a 30 day or 90 day supply? 90 day  6. How many refills are you requesting? n/a  7. What is your preferred pharmacy and location of the pharmacy?   [facility]   [address]  [facility]  [phone]  8. Who can we contact with further questions? n/a

## 2018-09-25 NOTE — TELEPHONE ENCOUNTER
States she has moved and is needing script sent to a different pharmacy [facility] in [place]. Request sent to Dr. Leon.

## 2018-09-25 NOTE — TELEPHONE ENCOUNTER
Patient states she has move and needs script for gabapentin sent to [facility] in [facility] instead of [facility] the pharmacy would not transfer.

## 2018-09-25 NOTE — TELEPHONE ENCOUNTER
----- Message from Essie Oglesby sent at 9/25/2018  2:46 PM CDT -----  Contact: pt   Pt states that she need to speak with nurse regarding her med.   .[phone] (home)

## 2018-09-26 RX ORDER — GABAPENTIN 400 MG/1
CAPSULE ORAL
Qty: 30 CAPSULE | Refills: 0 | OUTPATIENT
Start: 2018-09-26

## 2018-10-22 ENCOUNTER — TELEPHONE (OUTPATIENT)
Dept: PHYSICAL MEDICINE AND REHAB | Facility: CLINIC | Age: 33
End: 2018-10-22

## 2018-10-22 NOTE — TELEPHONE ENCOUNTER
----- Message from Hali Chamorro sent at 10/22/2018  3:11 PM CDT -----  Contact: pt  The pt states she is in sever pain and needs a med called in, the pt pharmacist hasn't received and prescription, can be reached at [phone]///thxMW

## 2018-10-22 NOTE — TELEPHONE ENCOUNTER
Contacted the pt; explained that marcos is already gone for the day, she will be back tomorrow. Pt verbalized understanding.

## 2018-10-22 NOTE — TELEPHONE ENCOUNTER
----- Message from Hali Chamorro sent at 10/22/2018  3:11 PM CDT -----  Contact: pt  The pt states she is in sever pain and needs a med called in, the pt pharmacist hasn't received and prescription, can be reached at [phone]///thxMW

## 2018-10-23 ENCOUNTER — TELEPHONE (OUTPATIENT)
Dept: PAIN MEDICINE | Facility: CLINIC | Age: 33
End: 2018-10-23

## 2018-10-23 DIAGNOSIS — M51.34 DDD (DEGENERATIVE DISC DISEASE), THORACIC: ICD-10-CM

## 2018-10-23 DIAGNOSIS — G89.21 CHRONIC PAIN DUE TO TRAUMA: ICD-10-CM

## 2018-10-23 DIAGNOSIS — M79.18 MYOFASCIAL PAIN: ICD-10-CM

## 2018-10-23 DIAGNOSIS — M47.817 SPONDYLOSIS OF LUMBOSACRAL REGION WITHOUT MYELOPATHY OR RADICULOPATHY: Primary | ICD-10-CM

## 2018-10-23 DIAGNOSIS — Z98.1 HISTORY OF THORACIC SPINAL FUSION: ICD-10-CM

## 2018-10-23 RX ORDER — TIZANIDINE 2 MG/1
2 TABLET ORAL 3 TIMES DAILY PRN
Qty: 90 TABLET | Refills: 0 | Status: SHIPPED | OUTPATIENT
Start: 2018-10-23 | End: 2018-11-16 | Stop reason: SDUPTHER

## 2018-10-23 RX ORDER — TIZANIDINE 2 MG/1
2 TABLET ORAL 3 TIMES DAILY PRN
Qty: 90 TABLET | Refills: 0 | Status: SHIPPED | OUTPATIENT
Start: 2018-10-23 | End: 2018-10-23 | Stop reason: SDUPTHER

## 2018-10-23 RX ORDER — GABAPENTIN 600 MG/1
600 TABLET ORAL 3 TIMES DAILY
Qty: 90 TABLET | Refills: 1 | Status: SHIPPED | OUTPATIENT
Start: 2018-10-23 | End: 2019-01-04 | Stop reason: SDUPTHER

## 2018-10-23 NOTE — TELEPHONE ENCOUNTER
----- Message from Bradford Calderón sent at 10/23/2018  7:30 AM CDT -----  Contact: same  Patient called in and stated the gabapentin (NEURONTIN) 400 MG capsule that Dr. Leon is prescribing her for her back pain is not working.  Patient stated she is in extreme pain and needs to be seen this week?  Patient stated she spoke to nurse yesterday who stated they were going to call in a pain medication for her??    Patient would like a call back at [phone]

## 2018-10-24 ENCOUNTER — TELEPHONE (OUTPATIENT)
Dept: PAIN MEDICINE | Facility: CLINIC | Age: 33
End: 2018-10-24

## 2018-10-24 NOTE — TELEPHONE ENCOUNTER
----- Message from Aviva Treviño sent at 10/24/2018  8:34 AM CDT -----  Contact: pt  She's calling stating that her Rx Gabapentin needs a prior authorization at the [facility] in [place] on [address], please advise [phone] (home)

## 2018-10-30 ENCOUNTER — TELEPHONE (OUTPATIENT)
Dept: OBSTETRICS AND GYNECOLOGY | Facility: CLINIC | Age: 33
End: 2018-10-30

## 2018-10-30 NOTE — TELEPHONE ENCOUNTER
Spoke with pt, pt states that she is on the Svetlana, and she has already had a cycle for the month of October, and now she is experiencing bleeding/spotting. Informed pt that breakthrough bleeding is common with the IUDs. Pt voiced understanding.

## 2018-11-01 ENCOUNTER — OFFICE VISIT (OUTPATIENT)
Dept: INTERNAL MEDICINE | Facility: CLINIC | Age: 33
End: 2018-11-01
Payer: MEDICAID

## 2018-11-01 VITALS
SYSTOLIC BLOOD PRESSURE: 144 MMHG | HEART RATE: 83 BPM | RESPIRATION RATE: 18 BRPM | OXYGEN SATURATION: 100 % | DIASTOLIC BLOOD PRESSURE: 88 MMHG | WEIGHT: 184.5 LBS | HEIGHT: 63 IN | BODY MASS INDEX: 32.69 KG/M2 | TEMPERATURE: 98 F

## 2018-11-01 DIAGNOSIS — B35.1 ONYCHOMYCOSIS: ICD-10-CM

## 2018-11-01 DIAGNOSIS — L03.90 CELLULITIS, UNSPECIFIED CELLULITIS SITE: Primary | ICD-10-CM

## 2018-11-01 PROCEDURE — 99999 PR PBB SHADOW E&M-EST. PATIENT-LVL IV: CPT | Mod: PBBFAC,,, | Performed by: FAMILY MEDICINE

## 2018-11-01 PROCEDURE — 99213 OFFICE O/P EST LOW 20 MIN: CPT | Mod: S$PBB,,, | Performed by: FAMILY MEDICINE

## 2018-11-01 PROCEDURE — 99214 OFFICE O/P EST MOD 30 MIN: CPT | Mod: PBBFAC,PO | Performed by: FAMILY MEDICINE

## 2018-11-01 RX ORDER — TERBINAFINE HYDROCHLORIDE 250 MG/1
250 TABLET ORAL DAILY
Qty: 30 TABLET | Refills: 0 | Status: SHIPPED | OUTPATIENT
Start: 2018-11-01 | End: 2018-12-01

## 2018-11-01 RX ORDER — TRIAMCINOLONE ACETONIDE 1 MG/G
CREAM TOPICAL 2 TIMES DAILY
Qty: 30 G | Refills: 0 | Status: SHIPPED | OUTPATIENT
Start: 2018-11-01 | End: 2019-08-12

## 2018-11-01 RX ORDER — SULFAMETHOXAZOLE AND TRIMETHOPRIM 800; 160 MG/1; MG/1
1 TABLET ORAL 2 TIMES DAILY
Qty: 20 TABLET | Refills: 0 | Status: SHIPPED | OUTPATIENT
Start: 2018-11-01 | End: 2019-08-12
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
## 2018-11-05 ENCOUNTER — TELEPHONE (OUTPATIENT)
Dept: INTERNAL MEDICINE | Facility: CLINIC | Age: 33
End: 2018-11-05

## 2018-11-05 NOTE — TELEPHONE ENCOUNTER
Pharmacy states that patient is getting two different strengths of gabapentin from two different doctors. On 10/24 from Dr. Mcclain she got 90 of 600 mg on 10/09 she picked up 90 400 mg by Dr. Leon. Today she's trying to fill it again from Dr. Leon . Sometimes she uses insurance and sometimes she uses the discount card. They also have her getting it from another doctor by the name Roz. I okayed them to cancel all prescriptions for gabapentin from Dr. Leon.

## 2018-11-05 NOTE — TELEPHONE ENCOUNTER
----- Message from Sakshi Mccann sent at 11/5/2018 12:27 PM CST -----  Contact: betty   Caller needs call back  pt medication [phone]

## 2018-11-16 RX ORDER — TIZANIDINE 2 MG/1
TABLET ORAL
Qty: 90 TABLET | Refills: 0 | Status: SHIPPED | OUTPATIENT
Start: 2018-11-16 | End: 2019-02-15 | Stop reason: SDUPTHER

## 2018-11-28 RX ORDER — GABAPENTIN 400 MG/1
CAPSULE ORAL
Qty: 90 CAPSULE | Refills: 0 | Status: SHIPPED | OUTPATIENT
Start: 2018-11-28 | End: 2018-12-08 | Stop reason: SDUPTHER

## 2018-12-10 RX ORDER — GABAPENTIN 400 MG/1
CAPSULE ORAL
Qty: 90 CAPSULE | Refills: 0 | Status: SHIPPED | OUTPATIENT
Start: 2018-12-10 | End: 2019-02-15 | Stop reason: SDUPTHER

## 2019-01-02 RX ORDER — TOPIRAMATE 50 MG/1
TABLET, FILM COATED ORAL
Qty: 60 TABLET | Refills: 0 | Status: SHIPPED | OUTPATIENT
Start: 2019-01-02 | End: 2019-02-01 | Stop reason: SDUPTHER

## 2019-01-03 ENCOUNTER — TELEPHONE (OUTPATIENT)
Dept: PAIN MEDICINE | Facility: CLINIC | Age: 34
End: 2019-01-03

## 2019-01-03 NOTE — TELEPHONE ENCOUNTER
Contacted patient; will notify Veronica Courtney of patient's concerns. Pt verbalized understanding.     ----- Message from Cecelia Basurto sent at 1/3/2019  3:51 PM CST -----  Contact: pt  States she needs to get an authorization on her gabapentin. Please call pt at [phone]. Thank you

## 2019-01-04 ENCOUNTER — TELEPHONE (OUTPATIENT)
Dept: PAIN MEDICINE | Facility: CLINIC | Age: 34
End: 2019-01-04

## 2019-01-04 NOTE — TELEPHONE ENCOUNTER
----- Message from Eleazar Middleton sent at 1/4/2019  3:08 PM CST -----  Contact: pt  She is calling back in regards to getting an override for a medicaid doctor to approve her refill, please advise [phone] (home)

## 2019-01-04 NOTE — TELEPHONE ENCOUNTER
----- Message from Aviva Treviño sent at 1/4/2019 10:43 AM CST -----  Contact: pt  She's calling stating that she needs a prior authorization for her Gabapentin Rx, it was sent to [facility] in [place], please advise [phone] (home)

## 2019-01-05 ENCOUNTER — OFFICE VISIT (OUTPATIENT)
Dept: URGENT CARE | Facility: CLINIC | Age: 34
End: 2019-01-05
Payer: MEDICAID

## 2019-01-05 VITALS
BODY MASS INDEX: 33.68 KG/M2 | TEMPERATURE: 99 F | HEART RATE: 101 BPM | DIASTOLIC BLOOD PRESSURE: 79 MMHG | HEIGHT: 62 IN | RESPIRATION RATE: 16 BRPM | SYSTOLIC BLOOD PRESSURE: 111 MMHG | OXYGEN SATURATION: 98 % | WEIGHT: 183 LBS

## 2019-01-05 DIAGNOSIS — G44.89 OTHER HEADACHE SYNDROME: Primary | ICD-10-CM

## 2019-01-05 DIAGNOSIS — B96.89 ACUTE BACTERIAL SINUSITIS: ICD-10-CM

## 2019-01-05 DIAGNOSIS — R05.9 COUGH: ICD-10-CM

## 2019-01-05 DIAGNOSIS — J01.90 ACUTE BACTERIAL SINUSITIS: ICD-10-CM

## 2019-01-05 PROCEDURE — 99214 OFFICE O/P EST MOD 30 MIN: CPT | Mod: PBBFAC,PO | Performed by: NURSE PRACTITIONER

## 2019-01-05 PROCEDURE — 99214 OFFICE O/P EST MOD 30 MIN: CPT | Mod: S$PBB,,, | Performed by: NURSE PRACTITIONER

## 2019-01-05 PROCEDURE — 99999 PR PBB SHADOW E&M-EST. PATIENT-LVL IV: ICD-10-PCS | Mod: PBBFAC,,, | Performed by: NURSE PRACTITIONER

## 2019-01-05 PROCEDURE — 99999 PR PBB SHADOW E&M-EST. PATIENT-LVL IV: CPT | Mod: PBBFAC,,, | Performed by: NURSE PRACTITIONER

## 2019-01-05 PROCEDURE — 99214 PR OFFICE/OUTPT VISIT, EST, LEVL IV, 30-39 MIN: ICD-10-PCS | Mod: S$PBB,,, | Performed by: NURSE PRACTITIONER

## 2019-01-05 RX ORDER — AMOXICILLIN AND CLAVULANATE POTASSIUM 875; 125 MG/1; MG/1
1 TABLET, FILM COATED ORAL EVERY 12 HOURS
Qty: 14 TABLET | Refills: 0 | Status: SHIPPED | OUTPATIENT
Start: 2019-01-05 | End: 2019-01-12

## 2019-01-05 RX ORDER — PROMETHAZINE HYDROCHLORIDE AND DEXTROMETHORPHAN HYDROBROMIDE 6.25; 15 MG/5ML; MG/5ML
5 SYRUP ORAL
Qty: 120 ML | Refills: 0 | Status: SHIPPED | OUTPATIENT
Start: 2019-01-05 | End: 2019-08-12

## 2019-01-05 NOTE — PATIENT INSTRUCTIONS
PLAN:   Advise increase p.o. fluids-- water/juice & rest  Meds:  Augmentin, deconex & Phenergan dm  / no refills  Simply saline nasal wash  to irrigate sinuses and for congestion/runny nose.  Cool mist humidifier/vaporizer.  Advised if no Deconex use Robitussin CF  Practice good handwashing.  Mucinex for cough and chest congestion.  Tylenol or Ibuprofen for fever, headache and body aches.  Warm salt water gargles for throat comfort.  Chloraseptic spray or lozenges for throat comfort.  Advise follow up with PCP  Advise go to ER if symptoms worsen or fail to improve with treatment.
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
## 2019-01-07 RX ORDER — TERBINAFINE HYDROCHLORIDE 250 MG/1
TABLET ORAL
Qty: 30 TABLET | Refills: 0 | OUTPATIENT
Start: 2019-01-07

## 2019-01-07 RX ORDER — GABAPENTIN 600 MG/1
TABLET ORAL
Qty: 90 TABLET | Refills: 0 | Status: SHIPPED | OUTPATIENT
Start: 2019-01-07 | End: 2019-02-28 | Stop reason: SDUPTHER

## 2019-01-08 ENCOUNTER — TELEPHONE (OUTPATIENT)
Dept: PAIN MEDICINE | Facility: CLINIC | Age: 34
End: 2019-01-08

## 2019-01-08 NOTE — TELEPHONE ENCOUNTER
----- Message from Katherine Novoa LPN sent at 1/4/2019  3:13 PM CST -----  Contact: pt      ----- Message -----  From: Eleazar Middleton  Sent: 1/4/2019   3:08 PM  To: [facility] Staff    She is calling back in regards to getting an override for a medicaid doctor to approve her refill, please advise [phone] (home)

## 2019-01-17 RX ORDER — GABAPENTIN 600 MG/1
TABLET ORAL
Qty: 90 TABLET | Refills: 2 | Status: SHIPPED | OUTPATIENT
Start: 2019-01-17 | End: 2019-03-19 | Stop reason: SDUPTHER

## 2019-01-24 ENCOUNTER — TELEPHONE (OUTPATIENT)
Dept: PAIN MEDICINE | Facility: CLINIC | Age: 34
End: 2019-01-24

## 2019-01-24 NOTE — TELEPHONE ENCOUNTER
Contacted patient's pharmacy. Spoke to Katherine at [facility]. Medication will be filled.     Informed patient that medication was filled. Pt verbalized understanding.     ----- Message from Yadira Cooley sent at 1/24/2019  2:44 PM CST -----  Contact: pt  Pt stated she calling about her prescription needing to be signed by a doctor, she can be reached at [phone]        [facility] [address] [place] [address] [address] [facility] [address]  [address]  [facility] [address]  Phone: [phone] Fax: [phone]

## 2019-02-01 RX ORDER — TOPIRAMATE 50 MG/1
TABLET, FILM COATED ORAL
Qty: 60 TABLET | Refills: 0 | Status: SHIPPED | OUTPATIENT
Start: 2019-02-01 | End: 2019-03-12 | Stop reason: SDUPTHER

## 2019-02-12 ENCOUNTER — TELEPHONE (OUTPATIENT)
Dept: OBSTETRICS AND GYNECOLOGY | Facility: CLINIC | Age: 34
End: 2019-02-12

## 2019-02-12 NOTE — TELEPHONE ENCOUNTER
----- Message from Shantelle Crockettite sent at 2/12/2019  9:26 AM CST -----  Contact: Pt   Pt called and stated she needs to know when her birth control was implanted and when does she need to replace it. She can be reached at [phone].    Thanks,  TF

## 2019-02-12 NOTE — TELEPHONE ENCOUNTER
Spoke with pt. Notified pt that the Svetlana is effective for three years. Pt verbalized understanding.

## 2019-02-15 RX ORDER — GABAPENTIN 400 MG/1
CAPSULE ORAL
Qty: 90 CAPSULE | Refills: 0 | Status: SHIPPED | OUTPATIENT
Start: 2019-02-15 | End: 2019-03-19 | Stop reason: SDUPTHER

## 2019-02-15 RX ORDER — TIZANIDINE 2 MG/1
TABLET ORAL
Qty: 90 TABLET | Refills: 0 | Status: SHIPPED | OUTPATIENT
Start: 2019-02-15 | End: 2019-06-26 | Stop reason: SDUPTHER

## 2019-02-28 ENCOUNTER — OFFICE VISIT (OUTPATIENT)
Dept: INTERNAL MEDICINE | Facility: CLINIC | Age: 34
End: 2019-02-28
Payer: MEDICAID

## 2019-02-28 VITALS
HEART RATE: 120 BPM | WEIGHT: 181.69 LBS | HEIGHT: 62 IN | OXYGEN SATURATION: 97 % | TEMPERATURE: 98 F | BODY MASS INDEX: 33.43 KG/M2 | DIASTOLIC BLOOD PRESSURE: 64 MMHG | SYSTOLIC BLOOD PRESSURE: 122 MMHG

## 2019-02-28 DIAGNOSIS — J02.0 STREP THROAT: Primary | ICD-10-CM

## 2019-02-28 DIAGNOSIS — H66.001 ACUTE SUPPURATIVE OTITIS MEDIA OF RIGHT EAR WITHOUT SPONTANEOUS RUPTURE OF TYMPANIC MEMBRANE, RECURRENCE NOT SPECIFIED: ICD-10-CM

## 2019-02-28 PROCEDURE — 99213 OFFICE O/P EST LOW 20 MIN: CPT | Mod: PBBFAC,PO,25 | Performed by: FAMILY MEDICINE

## 2019-02-28 PROCEDURE — 99999 PR PBB SHADOW E&M-EST. PATIENT-LVL III: CPT | Mod: PBBFAC,,, | Performed by: FAMILY MEDICINE

## 2019-02-28 PROCEDURE — 99213 OFFICE O/P EST LOW 20 MIN: CPT | Mod: S$PBB,,, | Performed by: FAMILY MEDICINE

## 2019-02-28 PROCEDURE — 96372 THER/PROPH/DIAG INJ SC/IM: CPT | Mod: PBBFAC,PO

## 2019-02-28 PROCEDURE — 99213 PR OFFICE/OUTPT VISIT, EST, LEVL III, 20-29 MIN: ICD-10-PCS | Mod: S$PBB,,, | Performed by: FAMILY MEDICINE

## 2019-02-28 PROCEDURE — 99999 PR PBB SHADOW E&M-EST. PATIENT-LVL III: ICD-10-PCS | Mod: PBBFAC,,, | Performed by: FAMILY MEDICINE

## 2019-02-28 RX ORDER — METHYLPREDNISOLONE ACETATE 80 MG/ML
80 INJECTION, SUSPENSION INTRA-ARTICULAR; INTRALESIONAL; INTRAMUSCULAR; SOFT TISSUE ONCE
Status: DISCONTINUED | OUTPATIENT
Start: 2019-02-28 | End: 2019-02-28

## 2019-02-28 RX ADMIN — PENICILLIN G BENZATHINE 1.2 MILLION UNITS: 1200000 INJECTION, SUSPENSION INTRAMUSCULAR at 03:02
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
## 2019-03-06 ENCOUNTER — TELEPHONE (OUTPATIENT)
Dept: INTERNAL MEDICINE | Facility: CLINIC | Age: 34
End: 2019-03-06

## 2019-03-06 NOTE — TELEPHONE ENCOUNTER
----- Message from Sakshi Mccann sent at 3/6/2019  3:13 PM CST -----  ..Type:  Patient Returning Call    Who Called:patient  Who Left Message for Patientn/a  Does the patient know what this is regarding?:n/a  Would the patient rather a call back or a response via [facility]?   Best Call Back Number:..[phone] (home)     Additional Information:

## 2019-03-06 NOTE — TELEPHONE ENCOUNTER
----- Message from Rosalba Giuseppe sent at 3/6/2019  1:36 PM CST -----  Contact: PT  .Type:  RX Refill Request    Who Called: PT  Refill or New Rx: REFILL  RX Name and Strength: AMOXICILLIN 800MG  How is the patient currently taking it? (ex. 1XDay): TWICE A DAY  Is this a 30 day or 90 day RX: 7 DAYS  Preferred Pharmacy with phone number: [facility] [address] [place], [place] - [address] [facility]  [address]  [facility] [address]  Phone: [phone] Fax: [phone]  Local or Mail Order: LOCAL  Ordering Provider: SHANNA  Would the patient rather a call back or a response via [facility]? CALL BACK  Best Call Back Number: [phone] (home)     Additional Information: ...

## 2019-03-06 NOTE — TELEPHONE ENCOUNTER
Spoke with pt , she saw you last week and she was dx with Strept , she got an injection if PCN , she is not better . She still has a sore throat, cough and sneezing .   Denies fever.   I recommended she come back in for a visit to make sure something different isn't going on and she said that she doesn't have transportation could you please just refill the Amoxicillin . If you can please send to [facility] in [place]

## 2019-03-06 NOTE — TELEPHONE ENCOUNTER
----- Message from Sakshi Mccann sent at 3/6/2019  3:13 PM CST -----  ..Type:  Patient Returning Call    Who Called:patient  Who Left Message for Patientn/a  Does the patient know what this is regarding?:n/a  Would the patient rather a call back or a response via [facility]?   Best Call Back Number:..[phone] (home)     Additional Information:

## 2019-03-06 NOTE — TELEPHONE ENCOUNTER
----- Message from Sakshi Mccann sent at 3/6/2019  3:13 PM CST -----  ..Type:  Patient Returning Call    Who Called:patient  Who Left Message for Patientn/a  Does the patient know what this is regarding?:n/a  Would the patient rather a call back or a response via [facility]?   Best Call Back Number:..[phone] (home)     Additional Information:

## 2019-03-06 NOTE — TELEPHONE ENCOUNTER
----- Message from Rosalba Giuseppe sent at 3/6/2019  1:36 PM CST -----  Contact: PT  .Type:  RX Refill Request    Who Called: PT  Refill or New Rx: REFILL  RX Name and Strength: AMOXICILLIN 800MG  How is the patient currently taking it? (ex. 1XDay): TWICE A DAY  Is this a 30 day or 90 day RX: 7 DAYS  Preferred Pharmacy with phone number: [facility] [address] [place], [place] - [address] AT [facility]  [address]  [facility] [address]  Phone: [phone] Fax: [phone]  Local or Mail Order: LOCAL  Ordering Provider: SHANNA  Would the patient rather a call back or a response via [facility]? CALL BACK  Best Call Back Number: [phone] (home)     Additional Information: ...

## 2019-03-07 ENCOUNTER — TELEPHONE (OUTPATIENT)
Dept: INTERNAL MEDICINE | Facility: CLINIC | Age: 34
End: 2019-03-07

## 2019-03-07 NOTE — TELEPHONE ENCOUNTER
The antibiotic shot she got will stay in her body for 2 -4 weeks. A refill of amoxicillin isn't needed, the coughing and sneezing is probably from allergies.

## 2019-03-07 NOTE — TELEPHONE ENCOUNTER
Spoke with pt about medication refill and getting over the counter medications for the coughing and sneezing. Pt states she will try over the counter meds.

## 2019-03-07 NOTE — TELEPHONE ENCOUNTER
----- Message from Diya Duffy sent at 3/7/2019 10:27 AM CST -----  Contact: Patient  Type:  Needs Medical Advice    Who Called: Pateint  Symptoms (please be specific): strep throat   How long has patient had these symptoms:  2 weeks  Pharmacy name and phone #:  [facility]  Would the patient rather a call back or a response via [facility]? call  Best Call Back Number: [phone]  Additional Information: Patient requesting antibiotic be called in for strep throat

## 2019-03-12 RX ORDER — TOPIRAMATE 50 MG/1
TABLET, FILM COATED ORAL
Qty: 60 TABLET | Refills: 0 | Status: SHIPPED | OUTPATIENT
Start: 2019-03-12 | End: 2019-04-30

## 2019-03-12 RX ORDER — GABAPENTIN 400 MG/1
CAPSULE ORAL
Qty: 90 CAPSULE | Refills: 0 | Status: SHIPPED | OUTPATIENT
Start: 2019-03-12 | End: 2019-03-19 | Stop reason: SDUPTHER

## 2019-03-18 RX ORDER — TIZANIDINE 2 MG/1
TABLET ORAL
Qty: 90 TABLET | Refills: 0 | OUTPATIENT
Start: 2019-03-18

## 2019-03-19 ENCOUNTER — TELEPHONE (OUTPATIENT)
Dept: PAIN MEDICINE | Facility: CLINIC | Age: 34
End: 2019-03-19

## 2019-03-19 RX ORDER — GABAPENTIN 600 MG/1
TABLET ORAL
Qty: 90 TABLET | Refills: 1 | Status: SHIPPED | OUTPATIENT
Start: 2019-03-19 | End: 2019-04-30 | Stop reason: SDUPTHER

## 2019-03-19 NOTE — TELEPHONE ENCOUNTER
----- Message from Ashlyn Price MA sent at 3/19/2019  1:52 PM CDT -----  Contact: Patient       ----- Message -----  From: Denisse Oglesby  Sent: 3/19/2019   1:41 PM  To: [facility] Staff    Type:  RX Refill Request    Who Called: Marissa  Refill or New Rx: Refill   RX Name and Strength: Gabapentin 600 mg  How is the patient currently taking it? (ex. 1XDay): 2x day  Is this a 30 day or 90 day RX: 30   Preferred Pharmacy with phone number:   [facility] [address] [place], [place] - [address] AT [facility]  [address]  [facility] [address]  Phone: [phone] Fax: [phone]  Local or Mail Order: [facility]  Ordering Provider: Veronica Courtney  Would the patient rather a call back or a response via [facility]? Call back   Best Call Back Number: Please call her at [phone]  Additional Information: n/a

## 2019-04-26 ENCOUNTER — TELEPHONE (OUTPATIENT)
Dept: PAIN MEDICINE | Facility: CLINIC | Age: 34
End: 2019-04-26

## 2019-04-26 NOTE — TELEPHONE ENCOUNTER
----- Message from Celi Landeros sent at 4/26/2019 11:08 AM CDT -----  Contact: SELF  Type:  Sooner Apoointment Request    Caller is requesting a sooner appointment.  Caller declined first available appointment listed below.  Caller will not accept being placed on the waitlist and is requesting a message be sent to doctor.  Name of Caller: Patient  When is the first available appointment? Not able to see schedule for Veronica Courtney  Symptoms: Back pain  Would the patient rather a call back or a response via [facility]? Call back   Best Call Back Number: [phone]  Additional Information: n/a    Thanks,   Celi Landeros

## 2019-04-30 ENCOUNTER — OFFICE VISIT (OUTPATIENT)
Dept: PAIN MEDICINE | Facility: CLINIC | Age: 34
End: 2019-04-30
Payer: MEDICAID

## 2019-04-30 ENCOUNTER — HOSPITAL ENCOUNTER (OUTPATIENT)
Dept: RADIOLOGY | Facility: HOSPITAL | Age: 34
Discharge: HOME | End: 2019-04-30
Attending: PHYSICIAN ASSISTANT
Payer: MEDICAID

## 2019-04-30 VITALS
SYSTOLIC BLOOD PRESSURE: 130 MMHG | DIASTOLIC BLOOD PRESSURE: 98 MMHG | BODY MASS INDEX: 33.31 KG/M2 | HEIGHT: 62 IN | WEIGHT: 181 LBS | RESPIRATION RATE: 18 BRPM | HEART RATE: 84 BPM

## 2019-04-30 DIAGNOSIS — M47.817 SPONDYLOSIS OF LUMBOSACRAL REGION WITHOUT MYELOPATHY OR RADICULOPATHY: ICD-10-CM

## 2019-04-30 DIAGNOSIS — M46.1 SACROILIITIS: Primary | ICD-10-CM

## 2019-04-30 DIAGNOSIS — G89.21 CHRONIC PAIN DUE TO TRAUMA: ICD-10-CM

## 2019-04-30 DIAGNOSIS — M79.18 MYOFASCIAL PAIN: ICD-10-CM

## 2019-04-30 DIAGNOSIS — M51.34 DDD (DEGENERATIVE DISC DISEASE), THORACIC: ICD-10-CM

## 2019-04-30 PROCEDURE — 72110 XR LUMBAR SPINE 5 VIEW WITH FLEX AND EXT: ICD-10-PCS | Mod: 26,,, | Performed by: RADIOLOGY

## 2019-04-30 PROCEDURE — 72220 X-RAY EXAM SACRUM TAILBONE: CPT | Mod: 26,,, | Performed by: RADIOLOGY

## 2019-04-30 PROCEDURE — 99214 OFFICE O/P EST MOD 30 MIN: CPT | Mod: S$PBB,,, | Performed by: PHYSICIAN ASSISTANT

## 2019-04-30 PROCEDURE — 99215 OFFICE O/P EST HI 40 MIN: CPT | Mod: PBBFAC,25 | Performed by: PHYSICIAN ASSISTANT

## 2019-04-30 PROCEDURE — 99999 PR PBB SHADOW E&M-EST. PATIENT-LVL V: ICD-10-PCS | Mod: PBBFAC,,, | Performed by: PHYSICIAN ASSISTANT

## 2019-04-30 PROCEDURE — 99214 PR OFFICE/OUTPT VISIT, EST, LEVL IV, 30-39 MIN: ICD-10-PCS | Mod: S$PBB,,, | Performed by: PHYSICIAN ASSISTANT

## 2019-04-30 PROCEDURE — 72110 X-RAY EXAM L-2 SPINE 4/>VWS: CPT | Mod: TC

## 2019-04-30 PROCEDURE — 72220 X-RAY EXAM SACRUM TAILBONE: CPT | Mod: TC

## 2019-04-30 PROCEDURE — 72220 XR SACRUM AND COCCYX: ICD-10-PCS | Mod: 26,,, | Performed by: RADIOLOGY

## 2019-04-30 PROCEDURE — 99999 PR PBB SHADOW E&M-EST. PATIENT-LVL V: CPT | Mod: PBBFAC,,, | Performed by: PHYSICIAN ASSISTANT

## 2019-04-30 PROCEDURE — 72110 X-RAY EXAM L-2 SPINE 4/>VWS: CPT | Mod: 26,,, | Performed by: RADIOLOGY

## 2019-04-30 RX ORDER — MELOXICAM 7.5 MG/1
7.5 TABLET ORAL 2 TIMES DAILY WITH MEALS
Qty: 60 TABLET | Refills: 0 | Status: SHIPPED | OUTPATIENT
Start: 2019-04-30 | End: 2019-05-03 | Stop reason: SDUPTHER

## 2019-04-30 RX ORDER — METHOCARBAMOL 500 MG/1
500 TABLET, FILM COATED ORAL 3 TIMES DAILY PRN
Qty: 60 TABLET | Refills: 0 | Status: SHIPPED | OUTPATIENT
Start: 2019-04-30 | End: 2019-05-03 | Stop reason: SDUPTHER

## 2019-04-30 RX ORDER — GABAPENTIN 600 MG/1
TABLET ORAL
Qty: 90 TABLET | Refills: 3 | Status: SHIPPED | OUTPATIENT
Start: 2019-04-30 | End: 2019-05-03 | Stop reason: SDUPTHER

## 2019-04-30 NOTE — PATIENT INSTRUCTIONS
Exercises to Strengthen Your Lower Back  Strong lower back and abdominal muscles work together to support your spine. The exercises below will help strengthen the lower back. It is important that you begin exercising slowly and increase levels gradually.  Always begin any exercise program with stretching. If you feel pain while doing any of these exercises, stop and talk to your doctor about a more specific exercise program that better suits your condition.   Low back stretch  The point of stretching is to make you more flexible and increase your range of motion. Stretch only as much as you are able. Stretch slowly. Do not push your stretch to the limit. If at any point you feel pain while stretching, this is your (temporary) limit.  · Lie on your back with your knees bent and both feet on the ground.  · Slowly raise your left knee to your chest as you flatten your lower back against the floor. Hold for 5 seconds.  · Relax and repeat the exercise with your right knee.  · Do 10 of these exercises for each leg.  · Repeat hugging both knees to your chest at the same time.  Building lower back strength  Start your exercise routine with 10 to 30 minutes a day, 1 to 3 times a day.  Initial exercises  Lying on your back:  1. Ankle pumps: Move your foot up and down, towards your head, and then away. Repeat 10 times with each foot.  2. Heel slides: Slowly bend your knee, drawing the heel of your foot towards you. Then slide your heel/foot from you, straightening your knee. Do not lift your foot off the floor (this is not a leg lift).  3. Abdominal contraction: Bend your knees and put your hands on your stomach. Tighten your stomach muscles. Hold for 5 seconds, then relax. Repeat 10 times.  4. Straight leg raise: Bend one leg at the knee and keep the other leg straight. Tighten your stomach muscles. Slowly lift your straight leg 6 to 12 inches off the floor and hold for up to 5 seconds. Repeat 10 times on each  side.  Standin. Wall squats: Stand with your back against the wall. Move your feet about 12 inches away from the wall. Tighten your stomach muscles, and slowly bend your knees until they are at about a 45 degree angle. Do not go down too far. Hold about 5 seconds. Then slowly return to your starting position. Repeat 10 times.  2. Heel raises: Stand facing the wall. Slowly raise the heels of your feet up and down, while keeping your toes on the floor. If you have trouble balancing, you can touch the wall with your hands. Repeat 10 times.  More advanced exercises  When you feel comfortable enough, try these exercises.  1. Kneeling lumbar extension: Begin on your hands and knees. At the same time, raise and straighten your right arm and left leg until they are parallel to the ground. Hold for 2 seconds and come back slowly to a starting position. Repeat with left arm and right leg, alternating 10 times.  2. Prone lumbar extension: Lie face down, arms extended overhead, palms on the floor. At the same time, raise your right arm and left leg as high as comfortably possible. Hold for 10 seconds and slowly return to start. Repeat with left arm and right leg, alternating 10 times. Gradually build up to 20 times. (Advanced: Repeat this exercise raising both arms and both legs a few inches off the floor at the same time. Hold for 5 seconds and release.)  3. Pelvic tilt: Lie on the floor on your back with your knees bent at 90 degrees. Your feet should be flat on the floor. Inhale, exhale, then slowly contract your abdominal muscles bringing your navel toward your spine. Let your pelvis rock back until your lower back is flat on the floor. Hold for 10 seconds while breathing smoothly.  4. Abdominal crunch: Perform a pelvic tilt (above) flattening your lower back against the floor. Holding the tension in your abdominal muscles, take another breath and raise your shoulder blades off the ground (this is not a full sit-up).  Keep your head in line with your body (dont bend your neck forward). Hold for 2 seconds, then slowly lower.  © 0973-8900 [employer]. [address] [place] [address], [place] [address]. All rights reserved. This information is not intended as a substitute for professional medical care. Always follow your healthcare professional's instructions.
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
## 2019-05-01 ENCOUNTER — TELEPHONE (OUTPATIENT)
Dept: PAIN MEDICINE | Facility: CLINIC | Age: 34
End: 2019-05-01

## 2019-05-01 NOTE — TELEPHONE ENCOUNTER
----- Message from Marie Veloz sent at 5/1/2019  8:54 AM CDT -----  Contact: Pt   States she's calling to get an authorization code for pt's med Methocarbinon (robaxin) 500 mg as per her pharm and can be reached at [phone]//thanks/alexa     [facility] [address] [place] [address] [address] [facility]  [address]  [facility] [address]  Phone: [phone] Fax: [phone]

## 2019-05-01 NOTE — TELEPHONE ENCOUNTER
----- Message from Will Stearns sent at 5/1/2019  1:36 PM CDT -----  Contact: [phone]  Would like a nurse to contact her regarding her medications states she needs prior authorization, she does not know the names on them please contact her @ [phone]. Thanks

## 2019-05-02 ENCOUNTER — TELEPHONE (OUTPATIENT)
Dept: PAIN MEDICINE | Facility: CLINIC | Age: 34
End: 2019-05-02

## 2019-05-02 NOTE — TELEPHONE ENCOUNTER
----- Message from Diya Duffy sent at 5/2/2019  6:47 AM CDT -----  Contact: Patient  Type:  Needs Medical Advice    Who Called: Patient  Symptoms (please be specific):    How long has patient had these symptoms:    Pharmacy name and phone #:  [facility] [place]  Would the patient rather a call back or a response via [facility]? call  Best Call Back Number: [phone]  Additional Information: Meloxicam, Gabapentin and another medication, that she vinson snot know the name of, all 3 needs prior authorization from Insurance.

## 2019-05-02 NOTE — TELEPHONE ENCOUNTER
----- Message from Eunice Jones sent at 5/2/2019  4:31 PM CDT -----  Contact: [phone]  Would like to consult with the nurse, patentis need the Dr to override her medication, patient states that's the Dr is the only can do it, patient states that's she is really in a lot of pain and need her medication as soon as possible, please call back at [phone], thanks sj

## 2019-05-03 ENCOUNTER — TELEPHONE (OUTPATIENT)
Dept: PAIN MEDICINE | Facility: CLINIC | Age: 34
End: 2019-05-03

## 2019-05-03 NOTE — TELEPHONE ENCOUNTER
----- Message from Ashlyn Price MA sent at 5/3/2019  8:30 AM CDT -----  Contact: pt   Please send to Dr Mcclain   ----- Message -----  From: Amber Weeks  Sent: 5/3/2019   6:57 AM  To: [facility] Staff    Type:  Needs Medical Advice    Who Called: PHI DOYLE  Symptoms (please be specific):   How long has patient had these symptoms:   Pharmacy name and phone #:   Would the patient rather a call back or a response via [facility]? Call   Best Call Back Number: [phone] (home)   Additional Information: pt is requesting a call back from the nurse in regards to the pt med being  150.00 because the  is not coved provider under her insurance

## 2019-05-03 NOTE — TELEPHONE ENCOUNTER
----- Message from Amber Weeks sent at 5/3/2019  6:57 AM CDT -----  Contact: pt   Type:  Needs Medical Advice    Who Called: PHI DOYLE  Symptoms (please be specific):   How long has patient had these symptoms:   Pharmacy name and phone #:   Would the patient rather a call back or a response via [facility]? Call   Best Call Back Number: [phone] (home)   Additional Information: pt is requesting a call back from the nurse in regards to the pt med being  150.00 because the Dr is not coved provider under her insurance

## 2019-05-03 NOTE — TELEPHONE ENCOUNTER
----- Message from Merary Gutiérrez sent at 5/3/2019  1:03 PM CDT -----  Contact: pt  Please call pt @ [phone] regarding medication, pt need to discuss all medication to go thru medicaid.

## 2019-05-04 RX ORDER — GABAPENTIN 600 MG/1
TABLET ORAL
Qty: 90 TABLET | Refills: 3 | Status: SHIPPED | OUTPATIENT
Start: 2019-05-04 | End: 2019-06-26 | Stop reason: SDUPTHER

## 2019-05-04 RX ORDER — MELOXICAM 7.5 MG/1
7.5 TABLET ORAL 2 TIMES DAILY WITH MEALS
Qty: 60 TABLET | Refills: 0 | Status: SHIPPED | OUTPATIENT
Start: 2019-05-04 | End: 2019-06-03

## 2019-05-04 RX ORDER — METHOCARBAMOL 500 MG/1
500 TABLET, FILM COATED ORAL 3 TIMES DAILY PRN
Qty: 60 TABLET | Refills: 0 | Status: SHIPPED | OUTPATIENT
Start: 2019-05-04 | End: 2019-06-26

## 2019-05-06 ENCOUNTER — TELEPHONE (OUTPATIENT)
Dept: PAIN MEDICINE | Facility: CLINIC | Age: 34
End: 2019-05-06

## 2019-05-06 NOTE — TELEPHONE ENCOUNTER
----- Message from Galileo Gomes sent at 5/6/2019  9:09 AM CDT -----  Contact: pt  Pt is calling regarding patient stating that pt has not been to get gabapentin RX filled because of pt insurance. Pt call back to discuss [phone].    Thanks

## 2019-05-17 ENCOUNTER — TELEPHONE (OUTPATIENT)
Dept: PAIN MEDICINE | Facility: CLINIC | Age: 34
End: 2019-05-17

## 2019-05-17 NOTE — TELEPHONE ENCOUNTER
Contacted patient; procedure scheduled.  All instructions given verbally and also mailed to listed address.

## 2019-05-27 ENCOUNTER — TELEPHONE (OUTPATIENT)
Dept: PAIN MEDICINE | Facility: CLINIC | Age: 34
End: 2019-05-27

## 2019-05-28 ENCOUNTER — TELEPHONE (OUTPATIENT)
Dept: PAIN MEDICINE | Facility: CLINIC | Age: 34
End: 2019-05-28

## 2019-05-31 ENCOUNTER — HOSPITAL ENCOUNTER (OUTPATIENT)
Facility: HOSPITAL | Age: 34
Discharge: HOME | End: 2019-05-31
Attending: PAIN MEDICINE | Admitting: PAIN MEDICINE
Payer: MEDICAID

## 2019-05-31 VITALS
HEART RATE: 87 BPM | OXYGEN SATURATION: 96 % | SYSTOLIC BLOOD PRESSURE: 119 MMHG | WEIGHT: 165 LBS | DIASTOLIC BLOOD PRESSURE: 61 MMHG | HEIGHT: 62 IN | RESPIRATION RATE: 16 BRPM | BODY MASS INDEX: 30.36 KG/M2

## 2019-05-31 DIAGNOSIS — M46.1 SACROILIITIS: ICD-10-CM

## 2019-05-31 PROCEDURE — 63600175 PHARM REV CODE 636 W HCPCS

## 2019-05-31 PROCEDURE — 27096 INJECT SACROILIAC JOINT: CPT | Mod: 50,,, | Performed by: PAIN MEDICINE

## 2019-05-31 PROCEDURE — 27096 PR INJECTION,SACROILIAC JOINT: ICD-10-PCS | Mod: 50,,, | Performed by: PAIN MEDICINE

## 2019-05-31 PROCEDURE — 63600175 PHARM REV CODE 636 W HCPCS: Performed by: PAIN MEDICINE

## 2019-05-31 PROCEDURE — 25000003 PHARM REV CODE 250

## 2019-05-31 PROCEDURE — 25000003 PHARM REV CODE 250: Performed by: PAIN MEDICINE

## 2019-05-31 RX ORDER — METHYLPREDNISOLONE ACETATE 40 MG/ML
INJECTION, SUSPENSION INTRA-ARTICULAR; INTRALESIONAL; INTRAMUSCULAR; SOFT TISSUE
Status: DISCONTINUED | OUTPATIENT
Start: 2019-05-31 | End: 2019-05-31 | Stop reason: HOSPADM

## 2019-05-31 RX ORDER — LIDOCAINE HYDROCHLORIDE 20 MG/ML
INJECTION, SOLUTION EPIDURAL; INFILTRATION; INTRACAUDAL; PERINEURAL
Status: DISCONTINUED | OUTPATIENT
Start: 2019-05-31 | End: 2019-05-31 | Stop reason: HOSPADM

## 2019-05-31 NOTE — OP NOTE
PROCEDURE: Sacroiliac joint injection under fluoroscopic guidance     SIDE: bilateral      PROCEDURE DATE: 5/31/2019    PREOPERATIVE DIAGNOSIS: Sacroiliitis  POSTOPERATIVE DIAGNOSIS: Sacroiliitis    PROVIDER: DRAKE Hicks MD  Assistant(s): None    ANESTHESIA: Local, No Sedation    >> 0 mg of VERSED    >> 0 mcg of FENTANYL     INDICATION: The patient has a history of pain due to sacroiliitis unresponsive to conservative treatments. Fluoroscopy was used to optimize visualization of needle placement and to maximize safety.     PROCEDURE DESCRIPTION: The patient was seen and identified in the preoperative area. Risks, benefits, complications, and alternatives were discussed with the patient. The patient agreed to proceed with the procedure and signed the consent. The site and side of the procedure was identified and marked. An IV was not placed for this procedure.     The patient was taken to the procedural suite. The patient was positioned in prone orientation on procedure table. A time out was performed prior to any intervention. The procedure, site, side, and allergies were stated and agreed to by all present. The lumbosacral area was widely prepped with ChloraPrep. The procedural site was draped in usual sterile fashion. Vital signs were closely monitored throughout this procedure. Conscious sedation was not used for this procedure.    The fluoroscopic camera was placed in contralateral oblique view and was adjusted until the anterior and posterior joint margins of the targeted sacroiliac joint aligned in linear array. The lower pole of the joint was identified, marked, and localized with 1% Lidocaine. A 25 gauge 3.5 inch spinal needle was introduced and advanced to the joint under fluoroscopic guidance. The joint space was entered and after negative aspiration, 2 mL of injectate was posited into the joint space. The needle was then withdrawn outside of the joint space and after negative aspiration 0.5 mL of  solution was injected outside of the joint space. The injectant solution used was comprised of 3 mL of 1% PF Lidocaine and 2 mL of Methylprednisolone (40 mg/mL). This techniques was performed for the above noted joint/s.    Description of Findings: Not applicable    Prosthetic devices, grafts, tissues, or devices implanted: None    Specimen Removed: No    Estimated Blood Loss: minimal    COMPLICATIONS: None    DISPOSITION / PLANS: The patient was transferred to the recovery area in a stable condition for observation. The patient was reexamined prior to discharge. There was no evidence of acute neurologic injury following the procedure.  Patient was discharged from the recovery room after meeting discharge criteria. Home discharge instructions were given to the patient by the staff.
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
## 2019-05-31 NOTE — NURSING
Patient dc'd home in stable condition via  with ride.  Patient verbalized understanding of DC instructions, patient voiced no complaints.  Patient stood at side of bed and walked to  with no new motor/neuro deficits.  Neurologically intact

## 2019-06-05 RX ORDER — MELOXICAM 7.5 MG/1
TABLET ORAL
Qty: 60 TABLET | Refills: 0 | OUTPATIENT
Start: 2019-06-05

## 2019-06-26 ENCOUNTER — TELEPHONE (OUTPATIENT)
Dept: PAIN MEDICINE | Facility: CLINIC | Age: 34
End: 2019-06-26

## 2019-06-26 ENCOUNTER — OFFICE VISIT (OUTPATIENT)
Dept: PAIN MEDICINE | Facility: CLINIC | Age: 34
End: 2019-06-26
Payer: MEDICAID

## 2019-06-26 VITALS
BODY MASS INDEX: 30.36 KG/M2 | DIASTOLIC BLOOD PRESSURE: 87 MMHG | RESPIRATION RATE: 18 BRPM | HEART RATE: 94 BPM | WEIGHT: 165 LBS | HEIGHT: 62 IN | SYSTOLIC BLOOD PRESSURE: 134 MMHG

## 2019-06-26 DIAGNOSIS — M46.1 SACROILIITIS: ICD-10-CM

## 2019-06-26 DIAGNOSIS — M47.817 SPONDYLOSIS OF LUMBOSACRAL REGION WITHOUT MYELOPATHY OR RADICULOPATHY: Primary | ICD-10-CM

## 2019-06-26 DIAGNOSIS — G89.21 CHRONIC PAIN DUE TO TRAUMA: ICD-10-CM

## 2019-06-26 DIAGNOSIS — M51.34 DDD (DEGENERATIVE DISC DISEASE), THORACIC: ICD-10-CM

## 2019-06-26 DIAGNOSIS — M79.18 MYOFASCIAL PAIN: ICD-10-CM

## 2019-06-26 PROCEDURE — 99214 OFFICE O/P EST MOD 30 MIN: CPT | Mod: S$PBB,,, | Performed by: PHYSICIAN ASSISTANT

## 2019-06-26 PROCEDURE — 99999 PR PBB SHADOW E&M-EST. PATIENT-LVL IV: ICD-10-PCS | Mod: PBBFAC,,, | Performed by: PHYSICIAN ASSISTANT

## 2019-06-26 PROCEDURE — 99214 PR OFFICE/OUTPT VISIT, EST, LEVL IV, 30-39 MIN: ICD-10-PCS | Mod: S$PBB,,, | Performed by: PHYSICIAN ASSISTANT

## 2019-06-26 PROCEDURE — 99999 PR PBB SHADOW E&M-EST. PATIENT-LVL IV: CPT | Mod: PBBFAC,,, | Performed by: PHYSICIAN ASSISTANT

## 2019-06-26 PROCEDURE — 99214 OFFICE O/P EST MOD 30 MIN: CPT | Mod: PBBFAC | Performed by: PHYSICIAN ASSISTANT

## 2019-06-26 RX ORDER — GABAPENTIN 800 MG/1
800 TABLET ORAL 3 TIMES DAILY
Qty: 90 TABLET | Refills: 1 | Status: SHIPPED | OUTPATIENT
Start: 2019-06-26 | End: 2019-08-02 | Stop reason: SDUPTHER

## 2019-06-26 RX ORDER — TIZANIDINE 2 MG/1
2 TABLET ORAL DAILY PRN
Qty: 30 TABLET | Refills: 0 | Status: SHIPPED | OUTPATIENT
Start: 2019-06-26 | End: 2019-08-01 | Stop reason: SDUPTHER

## 2019-06-26 NOTE — TELEPHONE ENCOUNTER
----- Message from Lashawn Shen sent at 6/26/2019  2:35 PM CDT -----  Contact: PATIENT  CALLING CONCERNING NEEDING TO GET HER RX TIZANIDINE AUTHORIZED TO GET IT FILLED. PLEASE CALL PATIENT ASAP TODAY URGENT!! @ [phone]. THANKS, ESTUARDO      [facility] [id number] [place] [address] [address] [facility]  [address]  [facility] [address]  Phone: [phone] Fax: [phone]

## 2019-06-26 NOTE — TELEPHONE ENCOUNTER
Contacted pt. Injection scheduled. Pre injection instructions taught and mailed. Orders entered. Pt was able to verbalize all understanding. All questions answered.
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
## 2019-06-26 NOTE — TELEPHONE ENCOUNTER
----- Message from Veronica Courtney PA-C sent at 6/26/2019  1:49 PM CDT -----  Schedule bilateral L3-5 MBB with local    No f/u - will call to get % relief

## 2019-07-01 ENCOUNTER — TELEPHONE (OUTPATIENT)
Dept: PAIN MEDICINE | Facility: CLINIC | Age: 34
End: 2019-07-01

## 2019-07-01 NOTE — TELEPHONE ENCOUNTER
----- Message from Veronica Courtney PA-C sent at 7/1/2019  7:30 AM CDT -----  Contact: Pt  Can you call pharmacy and see why it is not approved.    ----- Message -----  From: Ashlyn Price MA  Sent: 6/28/2019   5:04 PM  To: Veronica Courtney PA-C        ----- Message -----  From: Galileo Gomes  Sent: 6/28/2019   4:47 PM  To: [facility] Staff    Pt is calling  nurse staff regarding a refill for glabapen 800 mg.  Pt stated that Pharmacy said that they cant fill the RX because Dr Courtney is not  a medicaid Doctor. The Patient is asking the staff to get a medicaid Doctor to override so the patient can get a refill RX..    Pt call back [phone]    [facility] [address] [place] [address] [address] [facility] [address]  [place]  [address]  [facility] [address]  Phone: [phone] Fax: [phone]    Thanks

## 2019-07-03 ENCOUNTER — TELEPHONE (OUTPATIENT)
Dept: PAIN MEDICINE | Facility: CLINIC | Age: 34
End: 2019-07-03

## 2019-07-03 NOTE — TELEPHONE ENCOUNTER
----- Message from Yin Sam sent at 7/3/2019 11:34 AM CDT -----  Contact: Patient   Pt requesting a call back regarding injection time on 7/5/2019. This is URGENT!Please call back at [phone].      Thanks,  Yin Sam

## 2019-07-03 NOTE — TELEPHONE ENCOUNTER
Contact patient. No answer.  left a message to contact surgical center at [phone] . They will have the final say so on the time of arrival.

## 2019-07-03 NOTE — TELEPHONE ENCOUNTER
----- Message from Noris Posey sent at 7/3/2019  2:40 PM CDT -----  Contact: pt  Pt called to verify time of appt and why she have to come in @ 7:20 when time is for 12 ... [phone] (home)

## 2019-07-03 NOTE — TELEPHONE ENCOUNTER
Patient stated she does not know why she has to be at surgical center for 7 am when her procedure was originally 12:30 pm . I stated to patient that the surgical center has the final say so for the arrival time. They know what patients will be seen first and how long the procedures will be. If they called and confirm the arrival time of 7 am that is the time she should arrive. Patient then hung up on me.

## 2019-07-03 NOTE — TELEPHONE ENCOUNTER
----- Message from Jessenia Blanco sent at 7/3/2019  4:03 PM CDT -----  Contact: self  returned call regarding procedure time..[phone] (fugc)

## 2019-07-03 NOTE — TELEPHONE ENCOUNTER
Contact patient stated she was told by surgical center to arrive for 7 am . I stated to patient that is the time she should go by. No further concerns. All questions answered.

## 2019-07-05 ENCOUNTER — HOSPITAL ENCOUNTER (OUTPATIENT)
Facility: HOSPITAL | Age: 34
Discharge: HOME | End: 2019-07-05
Attending: PAIN MEDICINE | Admitting: PAIN MEDICINE
Payer: MEDICAID

## 2019-07-05 VITALS
SYSTOLIC BLOOD PRESSURE: 121 MMHG | OXYGEN SATURATION: 99 % | DIASTOLIC BLOOD PRESSURE: 71 MMHG | BODY MASS INDEX: 34.72 KG/M2 | RESPIRATION RATE: 18 BRPM | HEIGHT: 61 IN | TEMPERATURE: 98 F | HEART RATE: 72 BPM | WEIGHT: 183.88 LBS

## 2019-07-05 DIAGNOSIS — M47.816 LUMBAR SPONDYLOSIS: Primary | ICD-10-CM

## 2019-07-05 LAB
B-HCG UR QL: NEGATIVE
CTP QC/QA: YES

## 2019-07-05 PROCEDURE — 64494 INJ PARAVERT F JNT L/S 2 LEV: CPT | Mod: 50,,, | Performed by: PAIN MEDICINE

## 2019-07-05 PROCEDURE — 81025 URINE PREGNANCY TEST: CPT | Performed by: PAIN MEDICINE

## 2019-07-05 PROCEDURE — 64494 PR INJ DX/THER AGNT PARAVERT FACET JOINT,IMG GUIDE,LUMBAR/SAC, 2ND LEVEL: ICD-10-PCS | Mod: 50,,, | Performed by: PAIN MEDICINE

## 2019-07-05 PROCEDURE — 64493 PR INJ DX/THER AGNT PARAVERT FACET JOINT,IMG GUIDE,LUMBAR/SAC,1ST LVL: ICD-10-PCS | Mod: 50,,, | Performed by: PAIN MEDICINE

## 2019-07-05 PROCEDURE — 25000003 PHARM REV CODE 250: Performed by: PAIN MEDICINE

## 2019-07-05 PROCEDURE — 64495 INJ PARAVERT F JNT L/S 3 LEV: CPT | Mod: 50 | Performed by: PAIN MEDICINE

## 2019-07-05 PROCEDURE — 64494 INJ PARAVERT F JNT L/S 2 LEV: CPT | Mod: 50 | Performed by: PAIN MEDICINE

## 2019-07-05 PROCEDURE — 64493 INJ PARAVERT F JNT L/S 1 LEV: CPT | Mod: 50 | Performed by: PAIN MEDICINE

## 2019-07-05 PROCEDURE — 64493 INJ PARAVERT F JNT L/S 1 LEV: CPT | Mod: 50,,, | Performed by: PAIN MEDICINE

## 2019-07-05 RX ORDER — LIDOCAINE HYDROCHLORIDE 20 MG/ML
INJECTION, SOLUTION INFILTRATION; PERINEURAL
Status: DISCONTINUED | OUTPATIENT
Start: 2019-07-05 | End: 2019-07-05 | Stop reason: HOSPADM

## 2019-07-05 NOTE — DISCHARGE SUMMARY
[facility]  Short Stay  Discharge Summary    Admit Date: 7/5/2019    Discharge Date and Time: 7/5/2019 11:14 AM      Discharge Attending Physician: DRAKE Hicks MD     Hospital Course (synopsis of major diagnoses, care, treatment, and services provided during the course of the hospital stay): Patient was admitted to Pre-op where informed consent was signed.  The patient was then taken to the procedure suite where the procedure was performed.  The patient was then return to the Pre-Op area and discharge was performed.     Final Diagnoses:    Principal Problem: <principal problem not specified>   Secondary Diagnoses:   Active Hospital Problems    Diagnosis  POA    Lumbar spondylosis [M47.816]  Yes      Resolved Hospital Problems   No resolved problems to display.       Discharged Condition: good    Disposition: Home or Self Care    Follow up/Patient Instructions:    Medications:  Reconciled Home Medications:      Medication List      CONTINUE taking these medications    gabapentin 800 MG tablet  Commonly known as:  NEURONTIN  Take 1 tablet (800 mg total) by mouth 3 (three) times daily. TAKE 1 TABLET(600 MG) BY MOUTH THREE TIMES DAILY     levonorgestrel 14 mcg/24 hrs (3 yrs) 13.5 mg IUD  Commonly known as:  MAKEDA  1 each by Intrauterine route once.     promethazine-dextromethorphan 6.25-15 mg/5 mL Syrp  Commonly known as:  PROMETHAZINE-DM  Take 5 mLs by mouth every 6 to 8 hours as needed.     terbinafine HCl 250 mg tablet  Commonly known as:  LAMISIL  TAKE 1 TABLET(250 MG) BY MOUTH EVERY DAY     tiZANidine 2 MG tablet  Commonly known as:  ZANAFLEX  Take 1 tablet (2 mg total) by mouth daily as needed.     triamcinolone acetonide 0.1% 0.1 % cream  Commonly known as:  KENALOG  Apply topically 2 (two) times daily.        ASK your doctor about these medications    sulfamethoxazole-trimethoprim 800-160mg 800-160 mg Tab  Commonly known as:  BACTRIM DS  Take 1 tablet by mouth 2 (two) times daily.           Discharge Procedure Orders   Diet general     Call MD for:  severe uncontrolled pain     Call MD for:  difficulty breathing, headache or visual disturbances     Call MD for:  redness, tenderness, or signs of infection (pain, swelling, redness, odor or green/yellow discharge around incision site)     Activity as tolerated

## 2019-07-05 NOTE — PLAN OF CARE
Patient d/c home in stable condition via wheelchair with ride. Verbalized understanding of d/c instructions. Patient voiced no complaints. Patient stood at side of bed, walked steps with no new motor deficits. Neuro intact.

## 2019-07-05 NOTE — DISCHARGE INSTRUCTIONS
Pain Post Injection     1. Side effects may include: headache, restlessness at night, weakness to upper or lower extremities (arms or legs), flushing of the face and/ or neck. None are life threatening and will subside within 2-3 days.   2. If you have SEVERE headache with nausea and extreme pain, please call office ([phone]). Unless you usually have this type of migraine headache.   3. If you develop fever (greater than 101 F) or have any redness, swelling, or drainage at the injection site(s), please call off ([phone]). This may be a sign of infection.   4. If a rash or whelps (like hives) occur, please call the office ([phone]).  5. If you are a heart patient, please watch for symptoms such as swelling in your hands and feet and shortness of breath. If any of these symptoms occur, please notify your primary care/ heart doctor and go to the nearest emergency room.  6. If you have high blood pressure, you may experience an increase in your blood pressure over the next two weeks. Please continue to monitor your blood pressure and contact your primary care provider if your blood pressure does not return to baseline.    7. If you are a diabetic or you monitor your blood sugar, you may have an increase in your blood sugar over the next two weeks. If your blood sugar does not return to your baseline, please contact your primary care provider.  8. NO HEAT TO THE INJECTION SITE for the next 24 hours including: bath or shower, heating pad, moist heat, and / or hot tubs.   9. May use ice pack to injection site for any pain or discomfort. Apply ice pack for 20 minutes then remove for 20 minutes before re- applying to site. (recipe for homemade frozen gel pack below)  10. DO NOT DRIVE OR OPERATE HEAVY MACHINERY UNTIL TOMORROW MORNING.   11. OK to resume all medications unless otherwise directed by your  doctor.  12. Injection(s) may take up to two weeks until full effects are noted.  13. You may return to normal activity/ work the following day.    Homemade Frozen Gel Ice Pack:  1 bottle of rubbing alcohol  3 bottles of water (use rubbing alcohol bottle to measure)  2 large zip lock bags    Instructions:  1. Pour alcohol and water into zip lock bag. Make sure bag is large enough to allow for expansion.  2. Try to get as much air out of the freezer bag before sealing it shut.   3. Place the bag and its contents inside a second freezer bag to contain any leakage.   4. Leave the bag in the freezer for at least one hour.  When it's ready, place a towel between the gel pack and bare skin to avoid burning the skin.                                                                                                              Pain Post Injection     Side effects may include: headache, restlessness at night, weakness to upper or lower extremities (arms or legs), flushing of the face and/ or neck. None are life threatening and will subside within 2-3 days.   If you have SEVERE headache with nausea and extreme pain, please call office ([phone]). Unless you usually have this type of migraine headache.   If you develop fever (greater than 101 F) or have any redness, swelling, or drainage at the injection site(s), please call off ([phone]). This may be a sign of infection.   If a rash or whelps (like hives) occur, please call the office ([phone]).  If you are a heart patient, please watch for symptoms such as swelling in your hands and feet and shortness of breath. If any of these symptoms occur, please notify your primary care/ heart doctor and go to the nearest emergency room.  If you have high blood pressure, you may experience an increase in your blood pressure over the next two weeks. Please continue to monitor your blood pressure and contact your primary care provider if your blood pressure does not return to  baseline.    If you are a diabetic or you monitor your blood sugar, you may have an increase in your blood sugar over the next two weeks. If your blood sugar does not return to your baseline, please contact your primary care provider.  NO HEAT TO THE INJECTION SITE for the next 24 hours including: bath or shower, heating pad, moist heat, and / or hot tubs.   May use ice pack to injection site for any pain or discomfort. Apply ice pack for 20 minutes then remove for 20 minutes before re- applying to site. (recipe for homemade frozen gel pack below)  DO NOT DRIVE OR OPERATE HEAVY MACHINERY UNTIL TOMORROW MORNING.   OK to resume all medications unless otherwise directed by your doctor.  Injection(s) may take up to two weeks until full effects are noted.  You may return to normal activity/ work the following day.    Homemade Frozen Gel Ice Pack:  1 bottle of rubbing alcohol  3 bottles of water (use rubbing alcohol bottle to measure)  2 large zip lock bags    Instructions:  Pour alcohol and water into zip lock bag. Make sure bag is large enough to allow for expansion.  Try to get as much air out of the freezer bag before sealing it shut.   Place the bag and its contents inside a second freezer bag to contain any leakage.   Leave the bag in the freezer for at least one hour.  5. When it's ready, place a towel between the gel pack and bare skin to avoid burning the skin.
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
## 2019-07-05 NOTE — OP NOTE
Procedure: Lumbar Medial Branch Block under Fluoroscopic Guidance    Side: bilateral     Level:  Sacral ala (Corresponding to the L5 dorsal ramus), L5 transverse process (Corresponding to the L4 medial branch) and L4 transverse process (Corresponding to the L3 medial branch)     PROCEDURE DATE: 7/5/2019    Pre-operative Diagnosis: Lumbar Spondylosis  Post-operative Diagnosis: Lumbar Spondylosis    Provider: DRAKE Hicks MD  Assistant(s): none    Anesthesia: Local, No Sedation    >> 0 mg of VERSED    >> 0 mcg of FENTANYL     Indication: Low back pain without radiculopathy. Symptoms unresponsive to conservative treatments. Fluoroscopy was used to optimize visualization of needle placement and to maximize safety.     Procedure Description / Technique:  The patient was seen and identified in the preoperative area. Risks, benefits, complications, and alternatives were discussed with the patient. The patient agreed to proceed with the procedure and signed the consent. The site and side of the procedure was identified and marked. No iv was started.     The patient was taken to the procedural suite and positioned in prone orientation on the procedure table. A pillow was placed under the abdomen to reduce lumbar lordosis. A time out was performed. The procedure, site, side, and allergies were stated and agreed to by all present. The lumbosacral area was widely prepped with ChloraPrep. The procedural site was draped in usual sterile fashion. Vital signs were closely monitored throughout this procedure. Conscious sedation was NOT used for this procedure.    The Left sacral ala and superior articular process was identified and marked on AP fluoroscopic imaging. Subcutaneous tissues were localized using 1% PF Lidocaine to improve patient comfort. A 25 gauge 3.5 inch spinal needle was advance until the needle rested on OS at the interface of the sacral ala and the base of the sacral superior articular process. After negative  aspiration, 1ml of 2% lidocaine was injected. No pain or paresthesia was noted on injection. After bilateral injection, the fluoroscope was rotated into the oblique view and the spinal needle was advanced towards the eye of the ricardo dog until os was contacted. 1ml of 2% lidocaine was injected after negative aspiration. No pain or paresthesia was noted. This technique was repeated at each of the above noted levels. The spinal needle was removed intact following injection at each targeted site. The stylet was replaced prior to needle removal at each site.    Description of Findings: Not applicable    Prosthetic devices, grafts, tissues, or devices implanted: None    Specimen Removed: No    ESTIMATED BLOOD LOSS: minimal    COMPLICATIONS: None    DISPOSITION / PLANS: The patient was transferred to the recovery area in a stable condition for observation. The patient was reexamined prior to discharge. There was no evidence of acute neurologic injury following the procedure.  Patient was discharged from the recovery room after meeting discharge criteria. Home discharge instructions were given to the patient by the staff.

## 2019-07-05 NOTE — PLAN OF CARE
Pt aaa o x4, denies any pain/ discomfort, what to expect during recovery discussed with Pt and family at bedside. Pt and family verbalized understanding of post op instructions. All questions and concerns addressed, will continue to monitor until discharged.

## 2019-07-09 ENCOUNTER — TELEPHONE (OUTPATIENT)
Dept: PAIN MEDICINE | Facility: CLINIC | Age: 34
End: 2019-07-09

## 2019-07-09 DIAGNOSIS — M47.817 SPONDYLOSIS OF LUMBOSACRAL REGION WITHOUT MYELOPATHY OR RADICULOPATHY: Primary | ICD-10-CM

## 2019-07-09 NOTE — TELEPHONE ENCOUNTER
Patient stated she has 85% relief on the day of procedure and 0 pain level. She would like to move forward with next step . No further concerns.
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
## 2019-07-09 NOTE — TELEPHONE ENCOUNTER
----- Message from Marie Veloz sent at 7/9/2019  8:04 AM CDT -----  Contact: Pt   States she's calling to see when she is due to schedule an appt after receiving her injection and can be reached at [phone]//nisa/dbw

## 2019-07-09 NOTE — TELEPHONE ENCOUNTER
----- Message from Thierry Chamorro MA sent at 7/9/2019 11:07 AM CDT -----  She has 85% relief day of with 0 pain level. Would like to move forward with next step.       Cher  ----- Message -----  From: Veronica Courtney PA-C  Sent: 7/9/2019  10:35 AM  To: Thierry Chamorro MA    Thanks. I can put in next orders if she got relief.  Her insurance will likely require another diagnostic block.    ----- Message -----  From: Thierry Chamorro MA  Sent: 7/9/2019   9:40 AM  To: Veronica Courtney PA-C    Yes ma'am . I sure will .       ----- Message -----  From: Veronica Courtney PA-C  Sent: 7/9/2019   9:35 AM  To: Thierry Chamorro MA    Can you call to get pain relief from diagnostic MBB?

## 2019-07-09 NOTE — TELEPHONE ENCOUNTER
----- Message from Diya Duffy sent at 7/9/2019 10:16 AM CDT -----  Contact: Patient  Type:  Patient Returning Call    Who Called:Pateint  Who Left Message for Patient:nurse  Does the patient know what this is regarding?:  Would the patient rather a call back or a response via [facility]? call  Best Call Back Number:[phone]  Additional Information:

## 2019-07-10 ENCOUNTER — TELEPHONE (OUTPATIENT)
Dept: PAIN MEDICINE | Facility: CLINIC | Age: 34
End: 2019-07-10

## 2019-07-10 NOTE — TELEPHONE ENCOUNTER
----- Message from Katherine Avila sent at 7/10/2019  9:48 AM CDT -----  Type:  Needs Medical Advice    Who Called:  Pt  Marissa  Symptoms (please be specific):   injection   How long has patient had these symptoms:     Pharmacy name and phone #:     Would the patient rather a call back or a response via [facility]?  Call back  Best Call Back Number:   [phone]  Additional Information:  States she is going to have a procedure on 7-19-19//and she is calling to ask what time she is needing to be there//please call//martha/lc

## 2019-07-10 NOTE — TELEPHONE ENCOUNTER
No answer. Tried patient x2. Left message scheduling dept will call 2 days prior to let patient know the arrival time.

## 2019-07-12 ENCOUNTER — TELEPHONE (OUTPATIENT)
Dept: PAIN MEDICINE | Facility: CLINIC | Age: 34
End: 2019-07-12

## 2019-07-12 NOTE — TELEPHONE ENCOUNTER
----- Message from Marie Veloz sent at 7/12/2019  3:41 PM CDT -----  Contact: Pt   States she's calling to get the time of her procedure on the 19th and can be reached at [phone]//[email or web address]

## 2019-07-12 NOTE — TELEPHONE ENCOUNTER
left message on voicemail that someone will reach out to pt 48 hours prior to surgery to give pt time of arrival.//lp

## 2019-07-15 ENCOUNTER — TELEPHONE (OUTPATIENT)
Dept: PAIN MEDICINE | Facility: CLINIC | Age: 34
End: 2019-07-15

## 2019-07-15 NOTE — TELEPHONE ENCOUNTER
Patient wants to confirm time of arrival for procedure. Stated to patient the scheduling dept would call 2 days prior to confirm arrival. No further concerns.

## 2019-07-15 NOTE — TELEPHONE ENCOUNTER
----- Message from Denisse Oglesby sent at 7/15/2019 10:50 AM CDT -----  Contact: Patient   Patient would like a call back at [phone], Regards to her instructions and directions for her procedure.    Thanks  Td

## 2019-07-19 ENCOUNTER — HOSPITAL ENCOUNTER (OUTPATIENT)
Facility: HOSPITAL | Age: 34
Discharge: HOME | End: 2019-07-19
Attending: PAIN MEDICINE | Admitting: PAIN MEDICINE
Payer: MEDICAID

## 2019-07-19 VITALS
HEART RATE: 64 BPM | DIASTOLIC BLOOD PRESSURE: 79 MMHG | TEMPERATURE: 98 F | HEIGHT: 62 IN | RESPIRATION RATE: 16 BRPM | BODY MASS INDEX: 33.87 KG/M2 | SYSTOLIC BLOOD PRESSURE: 116 MMHG | WEIGHT: 184.06 LBS | OXYGEN SATURATION: 99 %

## 2019-07-19 DIAGNOSIS — M47.816 LUMBAR SPONDYLOSIS: Primary | ICD-10-CM

## 2019-07-19 PROCEDURE — 64494 PR INJ DX/THER AGNT PARAVERT FACET JOINT,IMG GUIDE,LUMBAR/SAC, 2ND LEVEL: ICD-10-PCS | Mod: 50,,, | Performed by: PAIN MEDICINE

## 2019-07-19 PROCEDURE — 64494 INJ PARAVERT F JNT L/S 2 LEV: CPT | Mod: 50 | Performed by: PAIN MEDICINE

## 2019-07-19 PROCEDURE — 99152 MOD SED SAME PHYS/QHP 5/>YRS: CPT | Mod: ,,, | Performed by: PAIN MEDICINE

## 2019-07-19 PROCEDURE — 64493 INJ PARAVERT F JNT L/S 1 LEV: CPT | Mod: 50,,, | Performed by: PAIN MEDICINE

## 2019-07-19 PROCEDURE — 64493 PR INJ DX/THER AGNT PARAVERT FACET JOINT,IMG GUIDE,LUMBAR/SAC,1ST LVL: ICD-10-PCS | Mod: 50,,, | Performed by: PAIN MEDICINE

## 2019-07-19 PROCEDURE — 64495 INJ PARAVERT F JNT L/S 3 LEV: CPT | Mod: 50 | Performed by: PAIN MEDICINE

## 2019-07-19 PROCEDURE — S0020 INJECTION, BUPIVICAINE HYDRO: HCPCS | Performed by: PAIN MEDICINE

## 2019-07-19 PROCEDURE — 64493 INJ PARAVERT F JNT L/S 1 LEV: CPT | Mod: 50 | Performed by: PAIN MEDICINE

## 2019-07-19 PROCEDURE — 99152 PR MOD CONSCIOUS SEDATION, SAME PHYS, 5+ YRS, FIRST 15 MIN: ICD-10-PCS | Mod: ,,, | Performed by: PAIN MEDICINE

## 2019-07-19 PROCEDURE — 25000003 PHARM REV CODE 250: Performed by: PAIN MEDICINE

## 2019-07-19 PROCEDURE — 64494 INJ PARAVERT F JNT L/S 2 LEV: CPT | Mod: 50,,, | Performed by: PAIN MEDICINE

## 2019-07-19 RX ORDER — BUPIVACAINE HYDROCHLORIDE 5 MG/ML
INJECTION, SOLUTION EPIDURAL; INTRACAUDAL
Status: DISCONTINUED | OUTPATIENT
Start: 2019-07-19 | End: 2019-07-19 | Stop reason: HOSPADM

## 2019-07-19 NOTE — DISCHARGE INSTRUCTIONS

## 2019-07-19 NOTE — OP NOTE
Procedure: Lumbar Medial Branch Block under Fluoroscopic Guidance    Side: bilateral     Level:  Sacral ala (Corresponding to the L5 dorsal ramus), L5 transverse process (Corresponding to the L4 medial branch) and L4 transverse process (Corresponding to the L3 medial branch)     PROCEDURE DATE: 7/19/2019    Pre-operative Diagnosis: Lumbar Spondylosis  Post-operative Diagnosis: Lumbar Spondylosis    Provider: DRAKE Hicks MD  Assistant(s): none    Anesthesia: Local, No Sedation    >> 0 mg of VERSED    >> 0 mcg of FENTANYL     Indication: Low back pain without radiculopathy. Symptoms unresponsive to conservative treatments. Fluoroscopy was used to optimize visualization of needle placement and to maximize safety.     Procedure Description / Technique:  The patient was seen and identified in the preoperative area. Risks, benefits, complications, and alternatives were discussed with the patient. The patient agreed to proceed with the procedure and signed the consent. The site and side of the procedure was identified and marked. No iv was started.     The patient was taken to the procedural suite and positioned in prone orientation on the procedure table. A pillow was placed under the abdomen to reduce lumbar lordosis. A time out was performed. The procedure, site, side, and allergies were stated and agreed to by all present. The lumbosacral area was widely prepped with ChloraPrep. The procedural site was draped in usual sterile fashion. Vital signs were closely monitored throughout this procedure. Conscious sedation was NOT used for this procedure.    The Left sacral ala and superior articular process was identified and marked on AP fluoroscopic imaging. Subcutaneous tissues were localized using 1% PF Lidocaine to improve patient comfort. A 25 gauge 3.5 inch spinal needle was advance until the needle rested on OS at the interface of the sacral ala and the base of the sacral superior articular process. After negative  aspiration, 1ml of 0.5% bupivacaine was injected. No pain or paresthesia was noted on injection. After bilateral injection, the fluoroscope was rotated into the oblique view and the spinal needle was advanced towards the eye of the ricardo dog until os was contacted. 1ml of 2% lidocaine was injected after negative aspiration. No pain or paresthesia was noted. This technique was repeated at each of the above noted levels. The spinal needle was removed intact following injection at each targeted site. The stylet was replaced prior to needle removal at each site.    Description of Findings: Not applicable    Prosthetic devices, grafts, tissues, or devices implanted: None    Specimen Removed: No    ESTIMATED BLOOD LOSS: minimal    COMPLICATIONS: None    DISPOSITION / PLANS: The patient was transferred to the recovery area in a stable condition for observation. The patient was reexamined prior to discharge. There was no evidence of acute neurologic injury following the procedure.  Patient was discharged from the recovery room after meeting discharge criteria. Home discharge instructions were given to the patient by the staff.    She'll need 150mm needles for RFA.

## 2019-07-19 NOTE — OR NURSING
Pt and friend they verbalized understanding of discharge instructions and pain diary. Patient voiced no complaints, with no further questions at this time. Bandaids X 6 C/D/I.  Patient stood at side of bed, walked steps with no new motor deficits. Neuro intact. VSS on RA. Recovery care complete.

## 2019-07-19 NOTE — PROGRESS NOTES
Patient will need 150mm needles and probes for RFA in the future.    DRAKE Hicks MD  Interventional Pain Medicine  [facility] [place]

## 2019-07-20 ENCOUNTER — NURSE TRIAGE (OUTPATIENT)
Dept: ADMINISTRATIVE | Facility: CLINIC | Age: 34
End: 2019-07-20

## 2019-07-20 NOTE — TELEPHONE ENCOUNTER
Reason for Disposition   Patient sounds very sick or weak to the triager    Protocols used: BACK PAIN-A-AH    Patient having severe back pain and requesting pain medication. Advised pain medication cannot be prescribed after hours. Advised ED now for severe back pain. She says she has issues with transportation. Advised if she chooses not to come in she can f/u with office on Monday.

## 2019-08-01 ENCOUNTER — TELEPHONE (OUTPATIENT)
Dept: PAIN MEDICINE | Facility: CLINIC | Age: 34
End: 2019-08-01

## 2019-08-01 RX ORDER — TIZANIDINE 2 MG/1
TABLET ORAL
Qty: 30 TABLET | Refills: 0 | Status: SHIPPED | OUTPATIENT
Start: 2019-08-01 | End: 2020-06-16 | Stop reason: SDUPTHER

## 2019-08-02 RX ORDER — GABAPENTIN 800 MG/1
800 TABLET ORAL 3 TIMES DAILY
Qty: 90 TABLET | Refills: 1 | Status: SHIPPED | OUTPATIENT
Start: 2019-08-02 | End: 2019-09-01

## 2019-08-02 NOTE — TELEPHONE ENCOUNTER
----- Message from Elise Norman sent at 2019 10:03 AM CDT -----  Contact: Self  Type:  RX Refill Request    Who Called: Marissa  Refill or New Rx:refill  RX Name and Strength:gabapentin (NEURONTIN) 800 MG tablet ()  How is the patient currently taking it? (ex. 1XDay):3xday  Is this a 30 day or 90 day RX:30  Preferred Pharmacy with phone number:  [id number] - [place] - [address] [facility]  [address]  [facility] [address]  Phone: [phone] Fax: [phone]      Local or Mail Order:local  Ordering Provider:Roz  Would the patient rather a call back or a response via [facility]? call  Best Call Back Number:[phone]  Additional Information:

## 2019-08-05 ENCOUNTER — TELEPHONE (OUTPATIENT)
Dept: PAIN MEDICINE | Facility: CLINIC | Age: 34
End: 2019-08-05

## 2019-08-05 NOTE — TELEPHONE ENCOUNTER
----- Message from Elisecarolina Austin sent at 8/5/2019  8:57 AM CDT -----  Contact: Self  Patient requesting a call back regarding prescription for gabapentin (NEURONTIN) 800 MG tablet. She states that she tried to refill the prescription and was told that the prescribing doctor is not approved by medicaid. Patient would also like to discuss her next appointment. Please call patient back at [phone]

## 2019-08-12 ENCOUNTER — TELEPHONE (OUTPATIENT)
Dept: INTERNAL MEDICINE | Facility: CLINIC | Age: 34
End: 2019-08-12

## 2019-08-12 ENCOUNTER — OFFICE VISIT (OUTPATIENT)
Dept: INTERNAL MEDICINE | Facility: CLINIC | Age: 34
End: 2019-08-12
Payer: MEDICAID

## 2019-08-12 VITALS
SYSTOLIC BLOOD PRESSURE: 116 MMHG | BODY MASS INDEX: 35.01 KG/M2 | HEIGHT: 62 IN | DIASTOLIC BLOOD PRESSURE: 86 MMHG | OXYGEN SATURATION: 97 % | WEIGHT: 190.25 LBS | HEART RATE: 110 BPM | TEMPERATURE: 100 F

## 2019-08-12 DIAGNOSIS — J01.00 ACUTE MAXILLARY SINUSITIS, RECURRENCE NOT SPECIFIED: Primary | ICD-10-CM

## 2019-08-12 DIAGNOSIS — K06.8 PAIN IN GUMS: ICD-10-CM

## 2019-08-12 PROCEDURE — 99213 PR OFFICE/OUTPT VISIT, EST, LEVL III, 20-29 MIN: ICD-10-PCS | Mod: S$PBB,,, | Performed by: NURSE PRACTITIONER

## 2019-08-12 PROCEDURE — 99999 PR PBB SHADOW E&M-EST. PATIENT-LVL III: CPT | Mod: PBBFAC,,, | Performed by: NURSE PRACTITIONER

## 2019-08-12 PROCEDURE — 99213 OFFICE O/P EST LOW 20 MIN: CPT | Mod: PBBFAC,PO | Performed by: NURSE PRACTITIONER

## 2019-08-12 PROCEDURE — 99999 PR PBB SHADOW E&M-EST. PATIENT-LVL III: ICD-10-PCS | Mod: PBBFAC,,, | Performed by: NURSE PRACTITIONER

## 2019-08-12 PROCEDURE — 99213 OFFICE O/P EST LOW 20 MIN: CPT | Mod: S$PBB,,, | Performed by: NURSE PRACTITIONER

## 2019-08-12 RX ORDER — IBUPROFEN 600 MG/1
600 TABLET ORAL 2 TIMES DAILY PRN
Qty: 60 TABLET | Refills: 1 | Status: ON HOLD | OUTPATIENT
Start: 2019-08-12 | End: 2019-11-19

## 2019-08-12 RX ORDER — AMOXICILLIN AND CLAVULANATE POTASSIUM 875; 125 MG/1; MG/1
1 TABLET, FILM COATED ORAL EVERY 12 HOURS
Qty: 14 TABLET | Refills: 0 | Status: SHIPPED | OUTPATIENT
Start: 2019-08-12 | End: 2019-10-17

## 2019-08-12 NOTE — TELEPHONE ENCOUNTER
----- Message from Noris Posey sent at 8/12/2019 11:22 AM CDT -----  Contact: pt  pt stated she will be 15 mins later advised pt of late pt protocol ... [phone] (home)     
Patient is a 28y old  Male who presents with a chief complaint of lower right pain for 3 days. 9/10 pain. Sensitive to eating and cold.      PAST MEDICAL & SURGICAL HISTORY:  Toxoplasmosis chorioretinitis, bilateral    ( -  ) heart valve replacement  ( -  ) joint replacement  ( -  ) pregnancy    MEDICATIONS  (STANDING): None    MEDICATIONS  (PRN): None      REVIEW OF SYSTEMS      General:	WNL      Allergies None      Intolerances None        FAMILY HISTORY:      *SOCIAL HISTORY: ( -  ) Tobacco; ( -  ) ETOH    Vital Signs Last 24 Hrs  T(C): 36.2 (02 Mar 2018 07:35), Max: 36.2 (02 Mar 2018 07:35)  T(F): 97.2 (02 Mar 2018 07:35), Max: 97.2 (02 Mar 2018 07:35)  HR: 75 (02 Mar 2018 07:35) (75 - 96)  BP: 138/62 (02 Mar 2018 07:35) (137/92 - 138/62)  BP(mean): --  RR: 20 (02 Mar 2018 07:35) (18 - 20)  SpO2: 100% (02 Mar 2018 07:35) (99% - 100%)      Last Dental Visit: << February 2017 >>    EOE:  TMJ (  - ) clicks                     ( -  ) pops                     ( -  ) crepitus             Mandible <<FROM>>             Facial bones and MOM <<grossly intact>>             ( -  ) trismus             ( -  ) lymphadenopathy             ( -  ) swelling             ( -  ) asymmetry             (  - ) palpation             ( -  ) dyspnea             (  - ) dysphagia             ( -  ) loss of consciousness    IOE:  <<permanent>> dentition                     Caries <<#32  >>                hard/soft palate:  (  - ) palatal torus, <<No pathology noted>>            tongue/FOM <<No pathology noted>>            labial/buccal mucosa <<No pathology noted>>           (  + ) percussion #32           (  + ) palpation #32           ( -  ) swelling            ( - ) abscess           (  - ) sinus tract    *DENTAL RADIOGRAPHS: 1 periapical    RADIOLOGY & ADDITIONAL STUDIES: None    *ASSESSMENT: #32 has extensive decay    *PLAN: Recommend extraction    PROCEDURE:   Explained risk and benefits as per OS sheet 7/13/00. Verbal and written consent given and signed. Signed side site.  Anesthesia: <<  2 Carpules of 2% lidocaine 1:100,000 epinephrine and 3 Carpules of 4% Septocaine 1:100,000 epineprhine inferior alveolar nerve block and long buccal nerve block  >>   Treatment: << Surgical extraction of #32, curetted and rinsed with saline, x-ray negative, placed chromic 3.0 absorbable suture stitches, placed gauze, and gave post ops. Prescribed amoxicillin 500 mg every 8 hours for 7 days and Motrin 600 mg every 6 hours for pain. >>     RECOMMENDATIONS:  2) Dental F/U with outpatient dentist for comprehensive dental care.   3) If any difficulty swallowing/breathing, fever occur, return to ER.     Julio Vazquze DDS
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
## 2019-08-19 ENCOUNTER — TELEPHONE (OUTPATIENT)
Dept: INTERNAL MEDICINE | Facility: CLINIC | Age: 34
End: 2019-08-19

## 2019-08-19 NOTE — TELEPHONE ENCOUNTER
----- Message from Jessenia Blanco sent at 8/19/2019 11:01 AM CDT -----  .Type:  RX Refill Request    Who Called: self  Refill or New Rx:refill  RX Name and Strength:amoxsclave 875mg  How is the patient currently taking it? (ex. 1XDay):1/day  Is this a 30 day or 90 day RX:30  Preferred Pharmacy with phone number:.  [facility] #[id number] [place], [place] - [address] AT [facility]  [address]  [facility] [address]  Phone: [phone] Fax: [phone]    Local or Mail Order:local  Ordering Provider:jalyn  Would the patient rather a call back or a response via [facility]? call  Best Call Back Number:.[phone] (home)   Additional Information:

## 2019-08-20 ENCOUNTER — TELEPHONE (OUTPATIENT)
Dept: PAIN MEDICINE | Facility: CLINIC | Age: 34
End: 2019-08-20

## 2019-08-20 NOTE — TELEPHONE ENCOUNTER
----- Message from Aleshia Ni sent at 8/20/2019  9:32 AM CDT -----  Contact: Pt   Pt is calling regarding requesting to have nurse call to back. Pt states that all is concerning  pt requesting to cancel appt and to reschedule  with nurse. .[phone] (home)         .Thank You  Maylin Ni

## 2019-09-13 ENCOUNTER — PES CALL (OUTPATIENT)
Dept: ADMINISTRATIVE | Facility: CLINIC | Age: 34
End: 2019-09-13

## 2019-09-27 ENCOUNTER — TELEPHONE (OUTPATIENT)
Dept: PAIN MEDICINE | Facility: CLINIC | Age: 34
End: 2019-09-27

## 2019-09-27 NOTE — TELEPHONE ENCOUNTER
----- Message from Elise Austin sent at 9/27/2019  4:07 PM CDT -----  Contact: Self  Type:  Patient Returning Call    Who Called:Marissa  Who Left Message for Patient:  Does the patient know what this is regarding?:yes  Would the patient rather a call back or a response via [facility]? call  Best Call Back Number:[phone]  Additional Information: wanting to schedule procedure

## 2019-09-27 NOTE — TELEPHONE ENCOUNTER
----- Message from Annette Shaw sent at 9/27/2019  8:54 AM CDT -----  Contact: Pt  Pt called in regards to scheduling a shot for a burn. Pt can be reached at [phone] (home)

## 2019-09-27 NOTE — TELEPHONE ENCOUNTER
----- Message from Denisse Oglesby sent at 9/27/2019 12:55 PM CDT -----  Contact: Patient   Patient would like a call back at [phone] regards to getting a appointment for a injection.

## 2019-09-30 ENCOUNTER — TELEPHONE (OUTPATIENT)
Dept: PAIN MEDICINE | Facility: CLINIC | Age: 34
End: 2019-09-30

## 2019-09-30 NOTE — TELEPHONE ENCOUNTER
Returned call, no answer. Left message stating that I will reach out to schedule once we receive orders.     ----- Message from Denisse Oglesby sent at 9/30/2019  3:36 PM CDT -----  Contact: Patient   Patient would like a call back at [phone], Regards to she is ready to schedule her procedure.    Thanks  Td

## 2019-09-30 NOTE — TELEPHONE ENCOUNTER
Duplicate call.     ----- Message from Yin Sam sent at 9/30/2019 12:19 PM CDT -----  Contact: patient   patient requesting a call back to schedule procedure.PLease call back at [phone].      Thanks,  Yin Sam

## 2019-09-30 NOTE — TELEPHONE ENCOUNTER
Contacted patient; no answer, left message stating that orders are not in yet so we are unable to schedule next procedure. Instructed patient to return call if needed.       ----- Message from Deisy Vaughan sent at 9/30/2019  2:47 PM CDT -----  Contact: Pt  Pt is requesting call back in regards to questions about scheduling procedure.            Pls call back at [phone]

## 2019-10-01 DIAGNOSIS — M47.817 SPONDYLOSIS OF LUMBOSACRAL REGION WITHOUT MYELOPATHY OR RADICULOPATHY: Primary | ICD-10-CM

## 2019-10-04 ENCOUNTER — TELEPHONE (OUTPATIENT)
Dept: PAIN MEDICINE | Facility: CLINIC | Age: 34
End: 2019-10-04

## 2019-10-04 NOTE — TELEPHONE ENCOUNTER
Contacted patient to schedule procedure; as I was in the process of scheduling procedure, phone was disconnected.  Immediately reached back out to patient however patient did not answer. Left message for patient to return call to schedule procedure.         ----- Message from Veronica Courtney PA-C sent at 10/1/2019 12:30 PM CDT -----  Contact: pt  Orders are in for right then left L3-5 RFA with RN IV sedation - if she wants to do nerve burn.... Do them like 3-4 weeks apart so we can make sure she gets relief from one of them.    ----- Message -----  From: Toyn Velasquez MA  Sent: 9/30/2019  10:50 AM CDT  To: Veronica Courtney PA-C    Contacted patient to check relief of diagnostic injection with Dr. Hicks on 07/05/2019 with 85% relief and 07/19/2019 with 60% relief from injection. Will notify Dr. Hicks of patient's relief.    ----- Message -----  From: Amber Weeks  Sent: 9/30/2019   9:55 AM CDT  To: Fabiola Balderas Staff    Type:  Patient Returning Call    Who Called:PHI DOYLE   Who Left Message for Patient: nurse   Does the patient know what this is regarding?: injection   Would the patient rather a call back or a response via [facility]? Call   Best Call Back Number: [phone] (home)   Additional Information:

## 2019-10-04 NOTE — TELEPHONE ENCOUNTER
No answer. Left message to return call.     ----- Message from Deisy Vaughan sent at 10/4/2019  4:44 PM CDT -----  Contact: Pt  Type:  Patient Returning Call    Who Called:PT  Who Left Message for Patient:JUDIE  Does the patient know what this is regarding?:YES  Would the patient rather a call back or a response via [facility]? CALL FREDDY  [facility] Call Back Number:[phone]  Additional Information:

## 2019-10-04 NOTE — TELEPHONE ENCOUNTER
----- Message from Yin Scott sent at 10/4/2019  5:31 PM CDT -----  Pt is returning a missed call,pt states to leave voice mail about surgery time and date.Please call and advise        Thank you

## 2019-10-07 ENCOUNTER — TELEPHONE (OUTPATIENT)
Dept: PAIN MEDICINE | Facility: CLINIC | Age: 34
End: 2019-10-07

## 2019-10-17 ENCOUNTER — TELEPHONE (OUTPATIENT)
Dept: INTERNAL MEDICINE | Facility: CLINIC | Age: 34
End: 2019-10-17

## 2019-10-17 ENCOUNTER — HOSPITAL ENCOUNTER (OUTPATIENT)
Dept: RADIOLOGY | Facility: HOSPITAL | Age: 34
Discharge: HOME | End: 2019-10-17
Attending: FAMILY MEDICINE
Payer: MEDICAID

## 2019-10-17 ENCOUNTER — OFFICE VISIT (OUTPATIENT)
Dept: INTERNAL MEDICINE | Facility: CLINIC | Age: 34
End: 2019-10-17
Payer: MEDICARE

## 2019-10-17 VITALS
WEIGHT: 192.69 LBS | SYSTOLIC BLOOD PRESSURE: 116 MMHG | HEIGHT: 62 IN | DIASTOLIC BLOOD PRESSURE: 76 MMHG | BODY MASS INDEX: 35.46 KG/M2 | TEMPERATURE: 99 F | OXYGEN SATURATION: 98 % | HEART RATE: 99 BPM

## 2019-10-17 DIAGNOSIS — M25.561 CHRONIC PAIN OF RIGHT KNEE: ICD-10-CM

## 2019-10-17 DIAGNOSIS — L02.91 ABSCESS: ICD-10-CM

## 2019-10-17 DIAGNOSIS — G89.29 CHRONIC PAIN OF RIGHT KNEE: ICD-10-CM

## 2019-10-17 DIAGNOSIS — J32.9 SINUSITIS, UNSPECIFIED CHRONICITY, UNSPECIFIED LOCATION: Primary | ICD-10-CM

## 2019-10-17 PROCEDURE — 73562 X-RAY EXAM OF KNEE 3: CPT | Mod: TC,PO,RT

## 2019-10-17 PROCEDURE — 99999 PR PBB SHADOW E&M-EST. PATIENT-LVL III: CPT | Mod: PBBFAC,,, | Performed by: FAMILY MEDICINE

## 2019-10-17 PROCEDURE — 99214 OFFICE O/P EST MOD 30 MIN: CPT | Mod: S$PBB,,, | Performed by: FAMILY MEDICINE

## 2019-10-17 PROCEDURE — 99999 PR PBB SHADOW E&M-EST. PATIENT-LVL III: ICD-10-PCS | Mod: PBBFAC,,, | Performed by: FAMILY MEDICINE

## 2019-10-17 PROCEDURE — 73560 XR KNEE ORTHO RIGHT: ICD-10-PCS | Mod: 26,59,LT, | Performed by: RADIOLOGY

## 2019-10-17 PROCEDURE — 99214 PR OFFICE/OUTPT VISIT, EST, LEVL IV, 30-39 MIN: ICD-10-PCS | Mod: S$PBB,,, | Performed by: FAMILY MEDICINE

## 2019-10-17 PROCEDURE — 73562 X-RAY EXAM OF KNEE 3: CPT | Mod: 26,RT,, | Performed by: RADIOLOGY

## 2019-10-17 PROCEDURE — 99213 OFFICE O/P EST LOW 20 MIN: CPT | Mod: PBBFAC,25,PO | Performed by: FAMILY MEDICINE

## 2019-10-17 PROCEDURE — 73560 X-RAY EXAM OF KNEE 1 OR 2: CPT | Mod: 26,59,LT, | Performed by: RADIOLOGY

## 2019-10-17 PROCEDURE — 73562 XR KNEE ORTHO RIGHT: ICD-10-PCS | Mod: 26,RT,, | Performed by: RADIOLOGY

## 2019-10-17 PROCEDURE — 73560 X-RAY EXAM OF KNEE 1 OR 2: CPT | Mod: TC,PO,LT

## 2019-10-17 RX ORDER — SULFAMETHOXAZOLE AND TRIMETHOPRIM 800; 160 MG/1; MG/1
1 TABLET ORAL 2 TIMES DAILY
Qty: 20 TABLET | Refills: 0 | Status: SHIPPED | OUTPATIENT
Start: 2019-10-17 | End: 2019-11-11 | Stop reason: ALTCHOICE

## 2019-10-17 RX ORDER — PREDNISONE 20 MG/1
40 TABLET ORAL DAILY
Qty: 8 TABLET | Refills: 0 | Status: SHIPPED | OUTPATIENT
Start: 2019-10-17 | End: 2019-10-21

## 2019-10-17 RX ORDER — GABAPENTIN 800 MG/1
TABLET ORAL
Qty: 90 TABLET | Refills: 0 | Status: SHIPPED | OUTPATIENT
Start: 2019-10-17 | End: 2019-12-12 | Stop reason: SDUPTHER
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
## 2019-10-17 NOTE — TELEPHONE ENCOUNTER
----- Message from Scott Dutton MD sent at 10/17/2019  5:44 PM CDT -----  Please let her know her xray showed some mild arthritis in her knee and small amount of swelling. If no improvement in a couple weeks let me know.

## 2019-10-17 NOTE — TELEPHONE ENCOUNTER
----- Message from Aracelis Frias sent at 10/17/2019 11:48 AM CDT -----  Contact: [facility]  Would to consult with nurse about a script that was sent electronically but the directions isn't clear. Please give call back at [phone].        Thanks,  Aracelis LARSON

## 2019-10-17 NOTE — TELEPHONE ENCOUNTER
----- Message from Merary Gutiérrez sent at 10/17/2019 10:26 AM CDT -----  Contact: pt  Type:  RX Refill Request    Who Called: Patient  Refill or New Rx:Refill  RX Name and Strength:Gapentin 800mg  How is the patient currently taking it? (ex. 1XDay):3xday  Is this a 30 day or 90 day RX:90  Preferred Pharmacy with phone number:[facility]  Local or Mail Order:Local  Ordering Provider:Dr Hicks  Would the patient rather a call back or a response via [facility]? Call back  Best Call Back Number:[phone]  Additional Information: please call pt when script is sent to pharmacy

## 2019-10-18 ENCOUNTER — TELEPHONE (OUTPATIENT)
Dept: INTERNAL MEDICINE | Facility: CLINIC | Age: 34
End: 2019-10-18

## 2019-10-22 ENCOUNTER — HOSPITAL ENCOUNTER (OUTPATIENT)
Facility: HOSPITAL | Age: 34
Discharge: HOME | End: 2019-10-22
Attending: PAIN MEDICINE | Admitting: PAIN MEDICINE
Payer: MEDICARE

## 2019-10-22 VITALS
HEART RATE: 94 BPM | SYSTOLIC BLOOD PRESSURE: 154 MMHG | OXYGEN SATURATION: 100 % | TEMPERATURE: 98 F | DIASTOLIC BLOOD PRESSURE: 87 MMHG | RESPIRATION RATE: 18 BRPM | BODY MASS INDEX: 36.26 KG/M2 | WEIGHT: 197.06 LBS | HEIGHT: 62 IN

## 2019-10-22 DIAGNOSIS — M47.816 LUMBAR SPONDYLOSIS: Primary | ICD-10-CM

## 2019-10-22 LAB
B-HCG UR QL: NEGATIVE
CTP QC/QA: YES

## 2019-10-22 PROCEDURE — 64635 DESTROY LUMB/SAC FACET JNT: CPT | Performed by: PAIN MEDICINE

## 2019-10-22 PROCEDURE — 81025 URINE PREGNANCY TEST: CPT | Performed by: PAIN MEDICINE

## 2019-10-22 PROCEDURE — S0020 INJECTION, BUPIVICAINE HYDRO: HCPCS | Performed by: PAIN MEDICINE

## 2019-10-22 PROCEDURE — 64636 DESTROY L/S FACET JNT ADDL: CPT | Mod: RT,,, | Performed by: PAIN MEDICINE

## 2019-10-22 PROCEDURE — 25000003 PHARM REV CODE 250: Performed by: PAIN MEDICINE

## 2019-10-22 PROCEDURE — 64636 PR DESTROY L/S FACET JNT ADDL: ICD-10-PCS | Mod: RT,,, | Performed by: PAIN MEDICINE

## 2019-10-22 PROCEDURE — 64636 DESTROY L/S FACET JNT ADDL: CPT | Performed by: PAIN MEDICINE

## 2019-10-22 PROCEDURE — 64635 PR DESTROY LUMB/SAC FACET JNT: ICD-10-PCS | Mod: RT,,, | Performed by: PAIN MEDICINE

## 2019-10-22 PROCEDURE — 64635 DESTROY LUMB/SAC FACET JNT: CPT | Mod: RT,,, | Performed by: PAIN MEDICINE

## 2019-10-22 RX ORDER — BUPIVACAINE HYDROCHLORIDE 5 MG/ML
INJECTION, SOLUTION EPIDURAL; INTRACAUDAL
Status: DISCONTINUED | OUTPATIENT
Start: 2019-10-22 | End: 2019-10-22 | Stop reason: HOSPADM

## 2019-10-22 RX ORDER — LIDOCAINE HYDROCHLORIDE 20 MG/ML
INJECTION, SOLUTION EPIDURAL; INFILTRATION; INTRACAUDAL; PERINEURAL
Status: DISCONTINUED | OUTPATIENT
Start: 2019-10-22 | End: 2019-10-22 | Stop reason: HOSPADM
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
## 2019-10-22 NOTE — DISCHARGE INSTRUCTIONS

## 2019-10-22 NOTE — OP NOTE
PROCEDURE: Lumbar medial branch radiofrequency (RF) ablation under fluoroscopic guidance    SIDE: right    LEVEL:   Sacral ala (Corresponding to the L5 dorsal ramus),   L5 transverse process (Corresponding to the L4 medial branch),   L4 transverse process (Corresponding to the L3 medial branch),     PROCEDURE DATE: 10/22/2019    PREOPERATIVE DIAGNOSIS: Lumbar spondylosis  POSTOPERATIVE DIAGNOSIS: Lumbar spondylosis    PROVIDER: DRAKE Hicks MD  Assistant(s): None    ANESTHESIA: Local, No Sedation    >> 0 mg of VERSED    >> 0 mcg of FENTANYL     INDICATION: The patient has low back pain without radiculopathy symptoms unresponsive to conservative treatments. Fluoroscopy was used to optimize visualization of needle placement and to maximize safety.        PROCEDURE DESCRIPTION / TECHNIQUE:   The patient was seen and identified in the preoperative area. Risks, benefits, complications, and alternatives were discussed with the patient. The patient agreed to proceed with the procedure and signed the consent. The site and side of the procedure was identified and marked. An iv was started.    The patient was taken to the procedural suite. The patient was positioned in prone orientation on procedure table and a pillow was placed under the abdomen to reduce lumbar lordosis. A time out was performed prior to any intervention. The procedure, site, side, and allergies were stated and agreed to by all present. The lumbosacral area was widely prepped with ChloraPrep. The procedural site was draped in usual sterile fashion. Vital signs were closely monitored throughout this procedure. Conscious sedation was used for this procedure to decrease patient anxiety.    Using AP fluoroscopy, the junction of the vetebral transverse process (TP) and the superior articular process (SAP) at the above noted levels was identified. The skin caudal and oblique to the SAP-TP junctions described above was marked. Superficial tissues were localized  with 2% PF Lidocaine at each level. OSIsoft 150 mm 20-gauge radiofrequency cannulae with curved 10-mm active tips were advanced to the above noted TP-SAP junction until osseus contact was made. The needle was walked off of the sulcus. The fluoroscope was obliqued to the ipsilateral side and the needles were repositioned as needed until the active tip was aligned along the base of the SAP and the needle tip met the anterior fluoroscopic shadow of the SAP. Lateral fluoroscopic imaging was captured and the needle tips were adjusted such that the tips extended into the SAP shadow, but not beyond the SAP silhouette into the intervertebral foramen. After satisfactory cannula positioning was realized in true lateral orientation, the needle stylets were removed, RF electrodes were introduced, and sensory and motor testing was performed.     Nerves were tested sequentially. Sensory testing was not performed at 50 Hz up to  0.5 volt with production of concordant pain. Motor testing was performed at 2 Hz up to 1.5 volts with elicitation of mulitifidus muscle contraction and with no radicular pain, paresthesias, or distal muscle contraction beyond the buttocks produced during motor testing. Following testing, RF electrodes were removed and 1 mL of 2% lidocaine and 1ml of 0.5% bupivacaine was injected through each cannula following negative aspiration. The RF electrodes were then replaced and each targeted medial branch was sequentially subjected to thermal coagulation at 80 degrees Celsius for 90 seconds. Following the burn, 1ml of 0.5% bupivacaine was injected, RF electrodes were removed, stylets were replaced, and each cannua was removed intact.    Description of Findings: Not applicable    Prosthetic devices, grafts, tissues, or devices implanted: None    Specimen Removed: No    Estimated Blood Loss: minimal    COMPLICATIONS: None    DISPOSITION / PLANS: The patient was transferred to the recovery area in a stable condition  for observation. The patient was reexamined prior to discharge. There was no evidence of acute neurologic injury following the procedure.  Patient was discharged from the recovery room after meeting discharge criteria. Home discharge instructions were given to the patient by the staff.

## 2019-10-23 ENCOUNTER — TELEPHONE (OUTPATIENT)
Dept: PAIN MEDICINE | Facility: CLINIC | Age: 34
End: 2019-10-23

## 2019-10-23 NOTE — TELEPHONE ENCOUNTER
----- Message from Evaristo Simon sent at 10/23/2019  9:27 AM CDT -----  Contact: Patient  Type:  Needs Medical Advice    Who Called: Marissa  Symptoms (please be specific): Burning pain   How long has patient had these symptoms: A couple of days  Pharmacy name and phone #: n/a  Would the patient rather a call back or a response via [facility]? Call back  Best Call Back Number: [phone]  Additional Information: Pt would like a call back to discuss if the pain she is experiencing is normal or not.    Thank you,    Evaristo Simon
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
## 2019-11-11 ENCOUNTER — HOSPITAL ENCOUNTER (EMERGENCY)
Facility: HOSPITAL | Age: 34
Discharge: HOME | End: 2019-11-11
Attending: EMERGENCY MEDICINE
Payer: MEDICARE

## 2019-11-11 ENCOUNTER — OFFICE VISIT (OUTPATIENT)
Dept: INTERNAL MEDICINE | Facility: CLINIC | Age: 34
End: 2019-11-11
Payer: MEDICARE

## 2019-11-11 VITALS
HEART RATE: 90 BPM | WEIGHT: 203.06 LBS | OXYGEN SATURATION: 98 % | TEMPERATURE: 99 F | DIASTOLIC BLOOD PRESSURE: 86 MMHG | HEIGHT: 62 IN | BODY MASS INDEX: 37.37 KG/M2 | SYSTOLIC BLOOD PRESSURE: 122 MMHG

## 2019-11-11 VITALS
DIASTOLIC BLOOD PRESSURE: 79 MMHG | TEMPERATURE: 98 F | HEART RATE: 81 BPM | BODY MASS INDEX: 36.73 KG/M2 | OXYGEN SATURATION: 97 % | SYSTOLIC BLOOD PRESSURE: 146 MMHG | WEIGHT: 200.81 LBS | RESPIRATION RATE: 16 BRPM

## 2019-11-11 DIAGNOSIS — R07.9 CHEST PAIN, UNSPECIFIED TYPE: ICD-10-CM

## 2019-11-11 DIAGNOSIS — H53.9 VISUAL CHANGES: ICD-10-CM

## 2019-11-11 DIAGNOSIS — J30.89 SEASONAL ALLERGIC RHINITIS DUE TO OTHER ALLERGIC TRIGGER: ICD-10-CM

## 2019-11-11 DIAGNOSIS — S09.90XA INJURY OF HEAD, INITIAL ENCOUNTER: ICD-10-CM

## 2019-11-11 DIAGNOSIS — S09.90XA INJURY OF HEAD, INITIAL ENCOUNTER: Primary | ICD-10-CM
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
## 2019-11-11 PROCEDURE — 99214 OFFICE O/P EST MOD 30 MIN: CPT | Mod: S$PBB,,, | Performed by: FAMILY MEDICINE

## 2019-11-11 PROCEDURE — 99999 PR PBB SHADOW E&M-EST. PATIENT-LVL III: CPT | Mod: PBBFAC,,, | Performed by: FAMILY MEDICINE

## 2019-11-11 PROCEDURE — 99999 PR PBB SHADOW E&M-EST. PATIENT-LVL III: ICD-10-PCS | Mod: PBBFAC,,, | Performed by: FAMILY MEDICINE

## 2019-11-11 PROCEDURE — 99214 PR OFFICE/OUTPT VISIT, EST, LEVL IV, 30-39 MIN: ICD-10-PCS | Mod: S$PBB,,, | Performed by: FAMILY MEDICINE

## 2019-11-11 PROCEDURE — 99213 OFFICE O/P EST LOW 20 MIN: CPT | Mod: PBBFAC,PO,25 | Performed by: FAMILY MEDICINE

## 2019-11-11 PROCEDURE — 99284 EMERGENCY DEPT VISIT MOD MDM: CPT | Mod: 25,27

## 2019-11-11 RX ORDER — CETIRIZINE HYDROCHLORIDE 10 MG/1
10 TABLET ORAL DAILY
Qty: 30 TABLET | Refills: 3 | Status: SHIPPED | OUTPATIENT
Start: 2019-11-11 | End: 2019-11-12

## 2019-11-11 RX ORDER — DEXAMETHASONE SODIUM PHOSPHATE 4 MG/ML
8 INJECTION, SOLUTION INTRA-ARTICULAR; INTRALESIONAL; INTRAMUSCULAR; INTRAVENOUS; SOFT TISSUE
Status: DISCONTINUED | OUTPATIENT
Start: 2019-11-11 | End: 2019-11-11
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
## 2019-11-12 ENCOUNTER — TELEPHONE (OUTPATIENT)
Dept: INTERNAL MEDICINE | Facility: CLINIC | Age: 34
End: 2019-11-12

## 2019-11-12 DIAGNOSIS — R07.9 CHEST PAIN, UNSPECIFIED TYPE: Primary | ICD-10-CM

## 2019-11-12 RX ORDER — MONTELUKAST SODIUM 10 MG/1
10 TABLET ORAL NIGHTLY
Qty: 30 TABLET | Refills: 3 | Status: SHIPPED | OUTPATIENT
Start: 2019-11-12 | End: 2020-05-22

## 2019-11-12 NOTE — TELEPHONE ENCOUNTER
----- Message from Cata Seay sent at 11/12/2019 10:18 AM CST -----  Contact: self/[phone]  Would like to consult with nurse regarding medication. Patient states that the medication Cetirizin 10 mg is not cover by her insurance, she some else. She states that [employer] is cover.. Please call back [phone]. Thanks/ar

## 2019-11-12 NOTE — TELEPHONE ENCOUNTER
Spoke with patient and informed her that a new rx has been sent to her pharmacy. Patient verbalized understanding

## 2019-11-12 NOTE — TELEPHONE ENCOUNTER
----- Message from Татьяна Thomas sent at 11/12/2019  4:25 PM CST -----  Contact: pt  Patient called stating that she need a prescription called in for congestion and coughing sent to [facility] due to medication that Dr. Dutton is not covered by insurance  [id number] - [place], [place] - [address] [facility]  [address]  [facility] [address]  Phone: [phone] Fax: [phone]    She would also like a sooner appt if medication can not be called in and can be reached at [phone]    Thanks,  Татьяна Thomas

## 2019-11-15 NOTE — PRE-PROCEDURE INSTRUCTIONS
Spoke with   patient   regarding procedure scheduled on 11/19/19  Has patient been sick with fever or on antibiotics within the last 7 days? no  Has patient received a vaccination within the last 7 days? no  Has the patient stopped all medications as directed? Yes, patient reports no longer taking advil. Last dose of 600mg ibuprofen 11/13/19- dr. Hicks made aware, ok to proceed with procedure  Does patient have a pacemaker and or defibrillator? No  Does the patient have a ride to and from procedure and someone reliable to remain with patient? yes  Is the patient diabetic? no  Does the patient have sleep apnea? no Or use O2 at home? no  Is the patient receiving sedation? yes  Is the patient instructed to remain NPO beginning at midnight the night before their procedure? yes

## 2019-11-19 ENCOUNTER — HOSPITAL ENCOUNTER (OUTPATIENT)
Facility: HOSPITAL | Age: 34
Discharge: HOME | End: 2019-11-19
Attending: PAIN MEDICINE | Admitting: PAIN MEDICINE
Payer: MEDICARE

## 2019-11-19 VITALS
TEMPERATURE: 98 F | HEART RATE: 92 BPM | OXYGEN SATURATION: 100 % | SYSTOLIC BLOOD PRESSURE: 118 MMHG | DIASTOLIC BLOOD PRESSURE: 80 MMHG | RESPIRATION RATE: 18 BRPM

## 2019-11-19 DIAGNOSIS — M47.816 LUMBAR SPONDYLOSIS: Primary | ICD-10-CM

## 2019-11-19 LAB
B-HCG UR QL: NEGATIVE
CTP QC/QA: YES

## 2019-11-19 PROCEDURE — 81025 URINE PREGNANCY TEST: CPT | Performed by: PAIN MEDICINE

## 2019-11-19 PROCEDURE — 64636 DESTROY L/S FACET JNT ADDL: CPT | Mod: LT,,, | Performed by: PAIN MEDICINE

## 2019-11-19 PROCEDURE — S0020 INJECTION, BUPIVICAINE HYDRO: HCPCS | Performed by: PAIN MEDICINE

## 2019-11-19 PROCEDURE — 64636 DESTROY L/S FACET JNT ADDL: CPT | Performed by: PAIN MEDICINE

## 2019-11-19 PROCEDURE — 64635 PR DESTROY LUMB/SAC FACET JNT: ICD-10-PCS | Mod: LT,,, | Performed by: PAIN MEDICINE

## 2019-11-19 PROCEDURE — 64635 DESTROY LUMB/SAC FACET JNT: CPT | Performed by: PAIN MEDICINE

## 2019-11-19 PROCEDURE — 64635 DESTROY LUMB/SAC FACET JNT: CPT | Mod: LT,,, | Performed by: PAIN MEDICINE

## 2019-11-19 PROCEDURE — 25000003 PHARM REV CODE 250: Performed by: PAIN MEDICINE

## 2019-11-19 PROCEDURE — 64636 PR DESTROY L/S FACET JNT ADDL: ICD-10-PCS | Mod: LT,,, | Performed by: PAIN MEDICINE

## 2019-11-19 RX ORDER — ETODOLAC 300 MG/1
CAPSULE ORAL
Qty: 180 CAPSULE | Refills: 0 | Status: SHIPPED | OUTPATIENT
Start: 2019-11-19 | End: 2020-01-09

## 2019-11-19 RX ORDER — LIDOCAINE HYDROCHLORIDE 20 MG/ML
INJECTION, SOLUTION EPIDURAL; INFILTRATION; INTRACAUDAL; PERINEURAL
Status: DISCONTINUED | OUTPATIENT
Start: 2019-11-19 | End: 2019-11-19 | Stop reason: HOSPADM

## 2019-11-19 RX ORDER — ETODOLAC 300 MG/1
300 CAPSULE ORAL 2 TIMES DAILY PRN
Qty: 60 CAPSULE | Refills: 0 | Status: SHIPPED | OUTPATIENT
Start: 2019-11-19 | End: 2019-11-19 | Stop reason: SDUPTHER

## 2019-11-19 RX ORDER — BUPIVACAINE HYDROCHLORIDE 5 MG/ML
INJECTION, SOLUTION EPIDURAL; INTRACAUDAL
Status: DISCONTINUED | OUTPATIENT
Start: 2019-11-19 | End: 2019-11-19 | Stop reason: HOSPADM

## 2019-11-19 NOTE — INTERVAL H&P NOTE
The patient has been examined and the H&P has been reviewed:    I concur with the findings and changes have been noted since the H&P was written: will proceed with left L3-5 lumbar RFA    Anesthesia/Surgery risks, benefits and alternative options discussed and understood by patient/family.          There are no hospital problems to display for this patient.

## 2019-11-19 NOTE — TELEPHONE ENCOUNTER
Contacted pt. Pt states rx sent in today     etodolac (LODINE) 300 MG Cap requires prior auth.    Contacted pt's pharmacy. Pt's pharmacy states rx went through insurance and will be read for  in an hour. Pt notified. All questions answered.//lp

## 2019-11-19 NOTE — TELEPHONE ENCOUNTER
----- Message from Hali Chamorro sent at 11/19/2019 11:56 AM CST -----  The pt stats she needs a approval on a medication, the pt gave no additional info and can be reached at [phone]//thxMW

## 2019-11-19 NOTE — PLAN OF CARE
Pt discharged home, awake, alert, oriented x's 4, family at bedside, denies any pain, no apparent distress noted. All questions and concerns addressed and answered, pt and family  verbalizes understanding of discharge process, pt meets discharge criteria and is being discharged to car via wheelchair.

## 2019-11-19 NOTE — DISCHARGE INSTRUCTIONS

## 2019-11-21 ENCOUNTER — TELEPHONE (OUTPATIENT)
Dept: PAIN MEDICINE | Facility: CLINIC | Age: 34
End: 2019-11-21

## 2019-11-21 NOTE — TELEPHONE ENCOUNTER
----- Message from Lashawn Shen sent at 11/21/2019 10:01 AM CST -----  Contact: PATIENT  Type:  Needs Medical Advice    Who Called: PATIENT  Symptoms (please be specific): PAIN POST PROCEDURE   How long has patient had these symptoms:  SINCE PROCEDURE MUCH WORSE  Pharmacy name and phone #:    [id number] - [place], [place] - [address] AT [facility]  [address]  [facility] [address]  Phone: [phone] Fax: [phone]    Would the patient rather a call back or a response via [facility]? CALL  Best Call Back Number: [phone]  Additional Information: PLEASE CALL PATIENT TODAY ASAP URGENT!!    
Pt states she in a lot of pain from procedure informed pt to give it up to 2 weeks for the injection to reach its full effect. Pt also states she has had black bleeding from taking etodolac and wants to know if that is normal. Informed pt  is out, so I will ask another provider and get back with her. Pt understood. All questions answered.   
[phone]

## 2019-11-26 NOTE — DISCHARGE SUMMARY
[facility]  Short Stay  Discharge Summary    Admit Date: 11/19/2019    Discharge Date and Time: 11/19/2019  9:24 AM      Discharge Attending Physician: DRAKE Hicks MD     Hospital Course (synopsis of major diagnoses, care, treatment, and services provided during the course of the hospital stay): Patient was admitted to Pre-op where informed consent was signed.  The patient was then taken to the procedure suite where the procedure was performed.  The patient was then return to the Pre-Op area and discharge was performed.     Final Diagnoses:    Principal Problem: <principal problem not specified>   Secondary Diagnoses: There are no hospital problems to display for this patient.      Discharged Condition: good    Disposition: Home or Self Care    Follow up/Patient Instructions:    Medications:  Reconciled Home Medications:      Medication List      ASK your doctor about these medications    gabapentin 800 MG tablet  Commonly known as:  NEURONTIN  TAKE 1 TABLET BY MOUTH THREE TIMES DAILY     ibuprofen 600 MG tablet  Commonly known as:  ADVIL,MOTRIN  Take 1 tablet (600 mg total) by mouth 2 (two) times daily as needed for Pain.     levonorgestrel 14 mcg/24 hrs (3 yrs) 13.5 mg IUD  Commonly known as:  MAKEDA  1 each by Intrauterine route once.     montelukast 10 mg tablet  Commonly known as:  SINGULAIR  Take 1 tablet (10 mg total) by mouth every evening.     tiZANidine 2 MG tablet  Commonly known as:  ZANAFLEX  TAKE 1 TABLET(2 MG) BY MOUTH DAILY AS NEEDED          No discharge procedures on file.

## 2019-11-26 NOTE — OP NOTE
PROCEDURE: Lumbar medial branch radiofrequency (RF) ablation under fluoroscopic guidance    SIDE: left    LEVEL:   Sacral ala (Corresponding to the L5 dorsal ramus),   L5 transverse process (Corresponding to the L4 medial branch),   L4 transverse process (Corresponding to the L3 medial branch),     PROCEDURE DATE: 11/19/19    PREOPERATIVE DIAGNOSIS: Lumbar spondylosis  POSTOPERATIVE DIAGNOSIS: Lumbar spondylosis    PROVIDER: DRAKE Hicks MD  Assistant(s): None    ANESTHESIA: Local, No Sedation    >> 0 mg of VERSED    >> 0 mcg of FENTANYL     INDICATION: The patient has low back pain without radiculopathy symptoms unresponsive to conservative treatments. Fluoroscopy was used to optimize visualization of needle placement and to maximize safety.        PROCEDURE DESCRIPTION / TECHNIQUE:   The patient was seen and identified in the preoperative area. Risks, benefits, complications, and alternatives were discussed with the patient. The patient agreed to proceed with the procedure and signed the consent. The site and side of the procedure was identified and marked. An iv was started.    The patient was taken to the procedural suite. The patient was positioned in prone orientation on procedure table and a pillow was placed under the abdomen to reduce lumbar lordosis. A time out was performed prior to any intervention. The procedure, site, side, and allergies were stated and agreed to by all present. The lumbosacral area was widely prepped with ChloraPrep. The procedural site was draped in usual sterile fashion. Vital signs were closely monitored throughout this procedure. Conscious sedation was used for this procedure to decrease patient anxiety.    Using AP fluoroscopy, the junction of the vetebral transverse process (TP) and the superior articular process (SAP) at the above noted levels was identified. The skin caudal and oblique to the SAP-TP junctions described above was marked. Superficial tissues were localized  with 2% PF Lidocaine at each level. Nu-Pulse 100 mm 20-gauge radiofrequency cannulae with curved 10-mm active tips were advanced to the above noted TP-SAP junction until osseus contact was made. The needle was walked off of the sulcus. The fluoroscope was obliqued to the ipsilateral side and the needles were repositioned as needed until the active tip was aligned along the base of the SAP and the needle tip met the anterior fluoroscopic shadow of the SAP. Lateral fluoroscopic imaging was captured and the needle tips were adjusted such that the tips extended into the SAP shadow, but not beyond the SAP silhouette into the intervertebral foramen. After satisfactory cannula positioning was realized in true lateral orientation, the needle stylets were removed, RF electrodes were introduced, and sensory and motor testing was performed.     Nerves were tested sequentially. Sensory testing was not performed at 50 Hz up to  0.5 volt with production of concordant pain. Motor testing was performed at 2 Hz up to 1.5 volts with elicitation of mulitifidus muscle contraction and with no radicular pain, paresthesias, or distal muscle contraction beyond the buttocks produced during motor testing. Following testing, RF electrodes were removed and 1 mL of 2% lidocaine and 1ml of 0.5% bupivacaine was injected through each cannula following negative aspiration. The RF electrodes were then replaced and each targeted medial branch was sequentially subjected to thermal coagulation at 80 degrees Celsius for 90 seconds. Following the burn, 1ml of 0.5% bupivacaine was injected, RF electrodes were removed, stylets were replaced, and each cannua was removed intact.    Description of Findings: Not applicable    Prosthetic devices, grafts, tissues, or devices implanted: None    Specimen Removed: No    Estimated Blood Loss: minimal    COMPLICATIONS: None    DISPOSITION / PLANS: The patient was transferred to the recovery area in a stable condition  for observation. The patient was reexamined prior to discharge. There was no evidence of acute neurologic injury following the procedure.  Patient was discharged from the recovery room after meeting discharge criteria. Home discharge instructions were given to the patient by the staff.

## 2019-12-09 ENCOUNTER — OFFICE VISIT (OUTPATIENT)
Dept: INTERNAL MEDICINE | Facility: CLINIC | Age: 34
End: 2019-12-09
Payer: MEDICARE

## 2019-12-09 ENCOUNTER — HOSPITAL ENCOUNTER (OUTPATIENT)
Dept: RADIOLOGY | Facility: HOSPITAL | Age: 34
Discharge: HOME | End: 2019-12-09
Attending: NURSE PRACTITIONER
Payer: MEDICARE

## 2019-12-09 ENCOUNTER — TELEPHONE (OUTPATIENT)
Dept: INTERNAL MEDICINE | Facility: CLINIC | Age: 34
End: 2019-12-09

## 2019-12-09 VITALS
TEMPERATURE: 98 F | WEIGHT: 209.44 LBS | OXYGEN SATURATION: 98 % | HEIGHT: 62 IN | HEART RATE: 103 BPM | BODY MASS INDEX: 38.54 KG/M2 | SYSTOLIC BLOOD PRESSURE: 126 MMHG | DIASTOLIC BLOOD PRESSURE: 82 MMHG

## 2019-12-09 DIAGNOSIS — N92.6 ABNORMAL MENSTRUAL PERIODS: ICD-10-CM

## 2019-12-09 DIAGNOSIS — J06.9 ACUTE URI: ICD-10-CM

## 2019-12-09 DIAGNOSIS — M25.561 ACUTE PAIN OF RIGHT KNEE: ICD-10-CM

## 2019-12-09 DIAGNOSIS — M25.561 ACUTE PAIN OF RIGHT KNEE: Primary | ICD-10-CM

## 2019-12-09 LAB
B-HCG UR QL: NEGATIVE
CTP QC/QA: YES

## 2019-12-09 PROCEDURE — 81025 URINE PREGNANCY TEST: CPT | Mod: PBBFAC,PO | Performed by: NURSE PRACTITIONER

## 2019-12-09 PROCEDURE — 73562 X-RAY EXAM OF KNEE 3: CPT | Mod: 26,RT,, | Performed by: RADIOLOGY

## 2019-12-09 PROCEDURE — 99214 OFFICE O/P EST MOD 30 MIN: CPT | Mod: 25,S$PBB,, | Performed by: NURSE PRACTITIONER

## 2019-12-09 PROCEDURE — 99214 PR OFFICE/OUTPT VISIT, EST, LEVL IV, 30-39 MIN: ICD-10-PCS | Mod: 25,S$PBB,, | Performed by: NURSE PRACTITIONER

## 2019-12-09 PROCEDURE — 99214 OFFICE O/P EST MOD 30 MIN: CPT | Mod: PBBFAC,25,PO | Performed by: NURSE PRACTITIONER

## 2019-12-09 PROCEDURE — 99999 PR PBB SHADOW E&M-EST. PATIENT-LVL IV: CPT | Mod: PBBFAC,,, | Performed by: NURSE PRACTITIONER

## 2019-12-09 PROCEDURE — 73560 XR KNEE ORTHO RIGHT: ICD-10-PCS | Mod: 26,59,LT, | Performed by: RADIOLOGY

## 2019-12-09 PROCEDURE — 73560 X-RAY EXAM OF KNEE 1 OR 2: CPT | Mod: 26,59,LT, | Performed by: RADIOLOGY

## 2019-12-09 PROCEDURE — 99999 PR PBB SHADOW E&M-EST. PATIENT-LVL IV: ICD-10-PCS | Mod: PBBFAC,,, | Performed by: NURSE PRACTITIONER

## 2019-12-09 PROCEDURE — 73562 XR KNEE ORTHO RIGHT: ICD-10-PCS | Mod: 26,RT,, | Performed by: RADIOLOGY

## 2019-12-09 PROCEDURE — 73560 X-RAY EXAM OF KNEE 1 OR 2: CPT | Mod: TC,PO,LT,59

## 2019-12-09 PROCEDURE — 73562 X-RAY EXAM OF KNEE 3: CPT | Mod: TC,PO,RT

## 2019-12-09 RX ORDER — DOXYCYCLINE HYCLATE 100 MG
TABLET ORAL
Refills: 0 | COMMUNITY
Start: 2019-12-02 | End: 2020-01-09

## 2019-12-09 RX ORDER — METHYLPREDNISOLONE ACETATE 80 MG/ML
80 INJECTION, SUSPENSION INTRA-ARTICULAR; INTRALESIONAL; INTRAMUSCULAR; SOFT TISSUE
Status: COMPLETED | OUTPATIENT
Start: 2019-12-09 | End: 2019-12-09

## 2019-12-09 RX ORDER — TRAMADOL HYDROCHLORIDE 50 MG/1
TABLET ORAL
Refills: 0 | COMMUNITY
Start: 2019-12-02 | End: 2020-01-09

## 2019-12-09 RX ORDER — ALBUTEROL SULFATE 90 UG/1
AEROSOL, METERED RESPIRATORY (INHALATION)
Refills: 3 | COMMUNITY
Start: 2019-12-02 | End: 2020-06-11 | Stop reason: SDUPTHER

## 2019-12-09 RX ADMIN — METHYLPREDNISOLONE ACETATE 80 MG: 80 INJECTION, SUSPENSION INTRALESIONAL; INTRAMUSCULAR; INTRASYNOVIAL; SOFT TISSUE at 11:12

## 2019-12-09 NOTE — PROGRESS NOTES
Pt given methylprednisolone acetate 80 mg/ml IM left ventrogluteal. Pt advised to wait 15 minutes to observe for adverse reaction. Pt tolerated well.
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
## 2019-12-10 ENCOUNTER — TELEPHONE (OUTPATIENT)
Dept: SPORTS MEDICINE | Facility: CLINIC | Age: 34
End: 2019-12-10

## 2019-12-10 NOTE — TELEPHONE ENCOUNTER
Left vm in regards to ortho referral and scheduling apt. Left call back phone number for patient to call to schedule an appointment.

## 2019-12-11 ENCOUNTER — TELEPHONE (OUTPATIENT)
Dept: SPORTS MEDICINE | Facility: CLINIC | Age: 34
End: 2019-12-11

## 2019-12-11 NOTE — TELEPHONE ENCOUNTER
Called pt in regards to scheduling ortho apt and pt states she was busy and unable to schedule at this time.

## 2019-12-11 NOTE — TELEPHONE ENCOUNTER
Called pt in regards to ortho referral and scheduled apt. Pt states she wanted to see a non surgical provider. Pt was informed to arrive 30 min early for x-ray.

## 2019-12-12 ENCOUNTER — TELEPHONE (OUTPATIENT)
Dept: ORTHOPEDICS | Facility: CLINIC | Age: 34
End: 2019-12-12

## 2019-12-12 DIAGNOSIS — M25.561 RIGHT KNEE PAIN, UNSPECIFIED CHRONICITY: Primary | ICD-10-CM

## 2019-12-12 RX ORDER — GABAPENTIN 800 MG/1
TABLET ORAL
Qty: 90 TABLET | Refills: 0 | Status: SHIPPED | OUTPATIENT
Start: 2019-12-12 | End: 2019-12-17 | Stop reason: SDUPTHER

## 2019-12-12 NOTE — TELEPHONE ENCOUNTER
Left voicemail for patient. Informed them that they need to arrive approx. 30min prior to appt to have xrays done. If they have any questions or concerns they can reach me at [phone]

## 2019-12-13 ENCOUNTER — HOSPITAL ENCOUNTER (OUTPATIENT)
Dept: RADIOLOGY | Facility: HOSPITAL | Age: 34
Discharge: HOME | End: 2019-12-13
Attending: FAMILY MEDICINE
Payer: MEDICARE

## 2019-12-13 ENCOUNTER — OFFICE VISIT (OUTPATIENT)
Dept: SPORTS MEDICINE | Facility: CLINIC | Age: 34
End: 2019-12-13
Payer: MEDICARE

## 2019-12-13 VITALS
SYSTOLIC BLOOD PRESSURE: 119 MMHG | DIASTOLIC BLOOD PRESSURE: 90 MMHG | HEART RATE: 94 BPM | BODY MASS INDEX: 38.46 KG/M2 | HEIGHT: 62 IN | WEIGHT: 209 LBS

## 2019-12-13 DIAGNOSIS — M25.561 RIGHT KNEE PAIN, UNSPECIFIED CHRONICITY: ICD-10-CM

## 2019-12-13 DIAGNOSIS — M25.561 ACUTE PAIN OF RIGHT KNEE: ICD-10-CM

## 2019-12-13 PROCEDURE — 73560 XR KNEE 1 OR 2 VIEW RIGHT: ICD-10-PCS | Mod: 26,RT,, | Performed by: RADIOLOGY

## 2019-12-13 PROCEDURE — 99214 PR OFFICE/OUTPT VISIT, EST, LEVL IV, 30-39 MIN: ICD-10-PCS | Mod: S$PBB,,, | Performed by: FAMILY MEDICINE

## 2019-12-13 PROCEDURE — 99999 PR PBB SHADOW E&M-EST. PATIENT-LVL III: CPT | Mod: PBBFAC,,, | Performed by: FAMILY MEDICINE

## 2019-12-13 PROCEDURE — 73560 X-RAY EXAM OF KNEE 1 OR 2: CPT | Mod: TC,RT

## 2019-12-13 PROCEDURE — 73560 X-RAY EXAM OF KNEE 1 OR 2: CPT | Mod: 26,RT,, | Performed by: RADIOLOGY

## 2019-12-13 PROCEDURE — 99213 OFFICE O/P EST LOW 20 MIN: CPT | Mod: PBBFAC,25 | Performed by: FAMILY MEDICINE

## 2019-12-13 PROCEDURE — 99999 PR PBB SHADOW E&M-EST. PATIENT-LVL III: ICD-10-PCS | Mod: PBBFAC,,, | Performed by: FAMILY MEDICINE

## 2019-12-13 PROCEDURE — 99214 OFFICE O/P EST MOD 30 MIN: CPT | Mod: S$PBB,,, | Performed by: FAMILY MEDICINE

## 2019-12-13 NOTE — PATIENT INSTRUCTIONS
Apply ice or heat to right knee 2 to 3 times a day as needed    Tylenol twice a day as needed    Wear brace as much as comfortable    Elevate right knee when possible to reduce swelling
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
## 2019-12-13 NOTE — LETTER
December 13, 2019      Aldair Mancia, [place]  [address] [address] [facility] [address]           [facility] [place]  [address] [address] [facility] [address]  Phone: [phone]  Fax: [phone]          Patient: Marissa Moctezuma   MR Number: [id number]   YOB: 1985   Date of Visit: 12/13/2019       Dear Aldair Mancia:    Thank you for referring Marissa Moctezuma to me for evaluation. Attached you will find relevant portions of my assessment and plan of care.    If you have questions, please do not hesitate to call me. I look forward to following Marissa Moctezuma along with you.    Sincerely,    Saqib Verma MD    Enclosure  CC:  No Recipients    If you would like to receive this communication electronically, please contact [email or web address] or [phone] to request more information on [facility] access.    For providers and/or their staff who would like to refer a patient to [facility], please contact us through our one-stop-shop provider referral line, [facility], at [phone].    If you feel you have received this communication in error or would no longer like to receive these types of communications, please e-mail [email or web address]

## 2019-12-15 ENCOUNTER — HOSPITAL ENCOUNTER (EMERGENCY)
Facility: HOSPITAL | Age: 34
Discharge: HOME | End: 2019-12-15
Attending: EMERGENCY MEDICINE
Payer: MEDICARE

## 2019-12-15 VITALS
TEMPERATURE: 99 F | SYSTOLIC BLOOD PRESSURE: 120 MMHG | RESPIRATION RATE: 16 BRPM | HEART RATE: 100 BPM | DIASTOLIC BLOOD PRESSURE: 81 MMHG | WEIGHT: 202.63 LBS | HEIGHT: 61 IN | BODY MASS INDEX: 38.26 KG/M2 | OXYGEN SATURATION: 97 %

## 2019-12-15 DIAGNOSIS — M25.561 ACUTE PAIN OF RIGHT KNEE: Primary | ICD-10-CM

## 2019-12-15 PROCEDURE — 99283 EMERGENCY DEPT VISIT LOW MDM: CPT

## 2019-12-15 RX ORDER — NAPROXEN 375 MG/1
375 TABLET ORAL 2 TIMES DAILY WITH MEALS
Qty: 20 TABLET | Refills: 0 | Status: SHIPPED | OUTPATIENT
Start: 2019-12-15 | End: 2020-02-06 | Stop reason: SDUPTHER

## 2019-12-15 NOTE — ED NOTES
Patient examined, evaluated, and educated on discharge instructions and prescriptions by Eran DIAS without nursing assistance. Patient discharged to lobby by NP
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
## 2019-12-17 ENCOUNTER — OFFICE VISIT (OUTPATIENT)
Dept: PAIN MEDICINE | Facility: CLINIC | Age: 34
End: 2019-12-17
Payer: MEDICARE

## 2019-12-17 VITALS
HEART RATE: 90 BPM | SYSTOLIC BLOOD PRESSURE: 123 MMHG | BODY MASS INDEX: 37.5 KG/M2 | HEIGHT: 61 IN | WEIGHT: 198.63 LBS | DIASTOLIC BLOOD PRESSURE: 81 MMHG

## 2019-12-17 DIAGNOSIS — M51.34 DDD (DEGENERATIVE DISC DISEASE), THORACIC: ICD-10-CM

## 2019-12-17 DIAGNOSIS — M79.18 MYOFASCIAL PAIN: ICD-10-CM

## 2019-12-17 DIAGNOSIS — M47.817 SPONDYLOSIS OF LUMBOSACRAL REGION WITHOUT MYELOPATHY OR RADICULOPATHY: Primary | ICD-10-CM

## 2019-12-17 DIAGNOSIS — G89.21 CHRONIC PAIN DUE TO TRAUMA: ICD-10-CM

## 2019-12-17 PROCEDURE — 99999 PR PBB SHADOW E&M-EST. PATIENT-LVL IV: ICD-10-PCS | Mod: PBBFAC,,, | Performed by: PHYSICIAN ASSISTANT

## 2019-12-17 PROCEDURE — 99214 PR OFFICE/OUTPT VISIT, EST, LEVL IV, 30-39 MIN: ICD-10-PCS | Mod: S$PBB,,, | Performed by: PHYSICIAN ASSISTANT

## 2019-12-17 PROCEDURE — 99999 PR PBB SHADOW E&M-EST. PATIENT-LVL IV: CPT | Mod: PBBFAC,,, | Performed by: PHYSICIAN ASSISTANT

## 2019-12-17 PROCEDURE — 99214 OFFICE O/P EST MOD 30 MIN: CPT | Mod: S$PBB,,, | Performed by: PHYSICIAN ASSISTANT

## 2019-12-17 PROCEDURE — 99214 OFFICE O/P EST MOD 30 MIN: CPT | Mod: PBBFAC | Performed by: PHYSICIAN ASSISTANT

## 2019-12-17 RX ORDER — GABAPENTIN 800 MG/1
800 TABLET ORAL 3 TIMES DAILY
Qty: 90 TABLET | Refills: 2 | Status: SHIPPED | OUTPATIENT
Start: 2019-12-17 | End: 2020-02-06
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
## 2019-12-30 ENCOUNTER — CLINICAL SUPPORT (OUTPATIENT)
Dept: REHABILITATION | Facility: HOSPITAL | Age: 34
End: 2019-12-30
Attending: PHYSICIAN ASSISTANT
Payer: MEDICARE

## 2019-12-30 DIAGNOSIS — M25.561 ACUTE PAIN OF RIGHT KNEE: Primary | ICD-10-CM

## 2019-12-30 PROCEDURE — 97110 THERAPEUTIC EXERCISES: CPT

## 2019-12-30 PROCEDURE — 97161 PT EVAL LOW COMPLEX 20 MIN: CPT

## 2019-12-31 DIAGNOSIS — M25.561 ACUTE PAIN OF RIGHT KNEE: Primary | ICD-10-CM
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
## 2020-01-09 ENCOUNTER — OFFICE VISIT (OUTPATIENT)
Dept: INTERNAL MEDICINE | Facility: CLINIC | Age: 35
End: 2020-01-09
Payer: MEDICARE

## 2020-01-09 VITALS
WEIGHT: 206.56 LBS | OXYGEN SATURATION: 99 % | HEART RATE: 104 BPM | TEMPERATURE: 99 F | HEIGHT: 61 IN | DIASTOLIC BLOOD PRESSURE: 72 MMHG | BODY MASS INDEX: 39 KG/M2 | SYSTOLIC BLOOD PRESSURE: 122 MMHG

## 2020-01-09 DIAGNOSIS — H66.91 RIGHT OTITIS MEDIA, UNSPECIFIED OTITIS MEDIA TYPE: ICD-10-CM

## 2020-01-09 DIAGNOSIS — R05.9 COUGH: Primary | ICD-10-CM

## 2020-01-09 LAB
CTP QC/QA: YES
POC MOLECULAR INFLUENZA A AGN: NEGATIVE
POC MOLECULAR INFLUENZA B AGN: NEGATIVE

## 2020-01-09 PROCEDURE — 99999 PR PBB SHADOW E&M-EST. PATIENT-LVL III: CPT | Mod: PBBFAC,,, | Performed by: FAMILY MEDICINE

## 2020-01-09 PROCEDURE — 99213 PR OFFICE/OUTPT VISIT, EST, LEVL III, 20-29 MIN: ICD-10-PCS | Mod: S$PBB,,, | Performed by: FAMILY MEDICINE

## 2020-01-09 PROCEDURE — 99213 OFFICE O/P EST LOW 20 MIN: CPT | Mod: PBBFAC,PO | Performed by: FAMILY MEDICINE

## 2020-01-09 PROCEDURE — 99213 OFFICE O/P EST LOW 20 MIN: CPT | Mod: S$PBB,,, | Performed by: FAMILY MEDICINE

## 2020-01-09 PROCEDURE — 87502 INFLUENZA DNA AMP PROBE: CPT | Mod: PBBFAC,PO | Performed by: FAMILY MEDICINE

## 2020-01-09 PROCEDURE — 99999 PR PBB SHADOW E&M-EST. PATIENT-LVL III: ICD-10-PCS | Mod: PBBFAC,,, | Performed by: FAMILY MEDICINE

## 2020-01-09 RX ORDER — AMOXICILLIN AND CLAVULANATE POTASSIUM 875; 125 MG/1; MG/1
1 TABLET, FILM COATED ORAL EVERY 12 HOURS
Qty: 20 TABLET | Refills: 0 | Status: SHIPPED | OUTPATIENT
Start: 2020-01-09 | End: 2020-02-06 | Stop reason: SDUPTHER

## 2020-01-09 RX ORDER — PREDNISONE 20 MG/1
40 TABLET ORAL DAILY
Qty: 8 TABLET | Refills: 0 | Status: SHIPPED | OUTPATIENT
Start: 2020-01-09 | End: 2020-01-13
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
## 2020-01-27 ENCOUNTER — TELEPHONE (OUTPATIENT)
Dept: PAIN MEDICINE | Facility: CLINIC | Age: 35
End: 2020-01-27

## 2020-01-27 NOTE — TELEPHONE ENCOUNTER
----- Message from Jessenia Blanco sent at 1/27/2020  8:08 AM CST -----  .Type:  RX Refill Request    Who Called: SELF  Refill or New Rx:REFILL  RX Name and Strength: GABAPENTIN 800MG  How is the patient currently taking it? (ex. 1XDay):3/DAY  Is this a 30 day or 90 day RX: 90  Preferred Pharmacy with phone number:.  [id number] [place], [place] - [address] AT [facility] [address] [facility]  [address]  [facility] [address]  Phone: [phone] Fax: [phone]    Local or Mail Order:LOCAL  Ordering Provider:MICHAEL  Would the patient rather a call back or a response via [facility]? CALL  Best Call Back Number:.[phone] (home)   Additional Information: INSURANCE ONLY COVERS IF [facility] ORDERS

## 2020-01-27 NOTE — TELEPHONE ENCOUNTER
gabapentin (NEURONTIN) 800 MG tablet 90 tablet 2 12/17/2019     Contacted pt. Informed pt rx sent 12/17/19 with 2 additional refills. Informed pt she should have another refill at the pharmacy. Pt states she will call her pharmacy. All questions answered.//lp

## 2020-01-28 ENCOUNTER — NURSE TRIAGE (OUTPATIENT)
Dept: ADMINISTRATIVE | Facility: CLINIC | Age: 35
End: 2020-01-28

## 2020-01-28 NOTE — TELEPHONE ENCOUNTER
"Marissa states she has been having chest pain, off and on, for the last 4 months.  Says she feels like her heart is racing, beating very rapidly.  She is short of breath, and says now the CP is constant for the last several hours and "it feels like an elephant is sitting on my chest".  Per [facility] triage protocol, recommend hang up now and call 911 for immediate medical attention.  Message to Wyatt Leon MD, pcp.  Please contact caller directly with any additional care advice.      Reason for Disposition   [1] Chest pain lasts > 5 minutes AND [2] described as crushing, pressure-like, or heavy    Additional Information   Negative: Severe difficulty breathing (e.g., struggling for each breath, speaks in single words)   Negative: Difficult to awaken or acting confused (e.g., disoriented, slurred speech)   Negative: Shock suspected (e.g., cold/pale/clammy skin, too weak to stand, low BP, rapid pulse)   Negative: [1] Chest pain lasts > 5 minutes AND [2] history of heart disease  (i.e., heart attack, bypass surgery, angina, angioplasty, CHF; not just a heart murmur)    Protocols used: CHEST PAIN-A-AH      "

## 2020-01-31 ENCOUNTER — TELEPHONE (OUTPATIENT)
Dept: PAIN MEDICINE | Facility: CLINIC | Age: 35
End: 2020-01-31

## 2020-01-31 NOTE — TELEPHONE ENCOUNTER
----- Message from Veronica Courtney PA-C sent at 1/31/2020  9:46 AM CST -----  Contact: PATIENT  We can consider an injection.  She has an appointment today (just added).  Call her and confirm appointment please.     ----- Message -----  From: Tony Velasquez MA  Sent: 1/30/2020   5:07 PM CST  To: Veronica Courtney PA-C    Pt wants to know if you think her getting an SI joint inj would be a good option for her? Will you or thom contact her tomorrow to update her on what you think is the best way to proceed ?  ----- Message -----  From: Lashawn Shen  Sent: 1/30/2020   2:56 PM CST  To: [facility] Staff    CALLING CONCERNING SEEING SOMEONE TO GET AN INJECTION FOR SCIATIC NERVE. PLEASE CALL PATIENT ASAP TODAY@ [phone]. THANKS, ESTUARDO

## 2020-02-03 RX ORDER — IBUPROFEN 600 MG/1
TABLET ORAL
Qty: 60 TABLET | Refills: 1 | OUTPATIENT
Start: 2020-02-03

## 2020-02-04 ENCOUNTER — TELEPHONE (OUTPATIENT)
Dept: PAIN MEDICINE | Facility: CLINIC | Age: 35
End: 2020-02-04

## 2020-02-06 ENCOUNTER — OFFICE VISIT (OUTPATIENT)
Dept: PAIN MEDICINE | Facility: CLINIC | Age: 35
End: 2020-02-06
Payer: MEDICARE

## 2020-02-06 ENCOUNTER — HOSPITAL ENCOUNTER (OUTPATIENT)
Dept: RADIOLOGY | Facility: HOSPITAL | Age: 35
Discharge: HOME | End: 2020-02-06
Attending: PHYSICIAN ASSISTANT
Payer: MEDICAID

## 2020-02-06 VITALS
RESPIRATION RATE: 18 BRPM | HEART RATE: 81 BPM | DIASTOLIC BLOOD PRESSURE: 96 MMHG | HEIGHT: 61 IN | BODY MASS INDEX: 38.89 KG/M2 | SYSTOLIC BLOOD PRESSURE: 140 MMHG | WEIGHT: 206 LBS

## 2020-02-06 DIAGNOSIS — M47.817 SPONDYLOSIS OF LUMBOSACRAL REGION WITHOUT MYELOPATHY OR RADICULOPATHY: ICD-10-CM

## 2020-02-06 DIAGNOSIS — M79.18 MYOFASCIAL PAIN: ICD-10-CM

## 2020-02-06 DIAGNOSIS — M46.1 SACROILIITIS: Primary | ICD-10-CM

## 2020-02-06 DIAGNOSIS — M54.2 NECK PAIN: ICD-10-CM

## 2020-02-06 DIAGNOSIS — G89.21 CHRONIC PAIN DUE TO TRAUMA: ICD-10-CM

## 2020-02-06 PROCEDURE — 99214 PR OFFICE/OUTPT VISIT, EST, LEVL IV, 30-39 MIN: ICD-10-PCS | Mod: S$PBB,,, | Performed by: PHYSICIAN ASSISTANT

## 2020-02-06 PROCEDURE — 99999 PR PBB SHADOW E&M-EST. PATIENT-LVL IV: ICD-10-PCS | Mod: PBBFAC,,, | Performed by: PHYSICIAN ASSISTANT

## 2020-02-06 PROCEDURE — 72052 XR CERVICAL SPINE 5 VIEW WITH FLEX AND EXT: ICD-10-PCS | Mod: 26,,, | Performed by: RADIOLOGY

## 2020-02-06 PROCEDURE — 99214 OFFICE O/P EST MOD 30 MIN: CPT | Mod: S$PBB,,, | Performed by: PHYSICIAN ASSISTANT

## 2020-02-06 PROCEDURE — 72052 X-RAY EXAM NECK SPINE 6/>VWS: CPT | Mod: TC

## 2020-02-06 PROCEDURE — 99999 PR PBB SHADOW E&M-EST. PATIENT-LVL IV: CPT | Mod: PBBFAC,,, | Performed by: PHYSICIAN ASSISTANT

## 2020-02-06 PROCEDURE — 99214 OFFICE O/P EST MOD 30 MIN: CPT | Mod: PBBFAC,25 | Performed by: PHYSICIAN ASSISTANT

## 2020-02-06 PROCEDURE — 72052 X-RAY EXAM NECK SPINE 6/>VWS: CPT | Mod: 26,,, | Performed by: RADIOLOGY

## 2020-02-06 RX ORDER — GABAPENTIN 800 MG/1
TABLET ORAL
Qty: 90 TABLET | Refills: 2 | Status: SHIPPED | OUTPATIENT
Start: 2020-02-06 | End: 2020-05-05
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
## 2020-02-06 NOTE — PATIENT INSTRUCTIONS
Protecting Your Neck: Posture and Body Mechanics  Protecting your neck from injuries and pain involves practicing good posture and body mechanics. This may mean correcting bad habits you have related to the way you hold and move your body. The tips below can help you improve your posture and body mechanics.     Using good posture while at your workstation can help prevent pain or injury.   What Is Posture and Why Does It Matter?  Posture is the way you hold your body. For many of us, this means hunching over, thrusting the chin forward, and slouching the shoulders. But this kind of poor posture keeps muscles from properly supporting the neck and puts stress on muscles, disks, ligaments, and joints in your neck. As a result, injury and pain can occur.  How Is Your Posture?  Use a full-length mirror to check your posture. To begin, stand normally. Then slowly back up against a wall. Is there space between your head and the wall? Do you slouch your shoulders? Is your chin pointing up or down? All these can cause neck pain and injury.  Improving Your Posture  Follow these steps to improve your posture:  · Pull your shoulders back.  · Think of the ears, shoulders, and hips as a series of dots. Now, adjust your body to connect the dots in a straight line.  · Keep your chin level.  What Are Body Mechanics and Why Do They Matter?  The way you move and position your body during daily activities is called body mechanics. Good body mechanics help protect the neck. This means learning the right ways to stand, sit, and even sleep. So do whats best for your neck and practice good body mechanics.  Standing   To protect your neck while standing:  · Carry objects close to your body.  · Keep your ears and shoulders in a line while standing or walking.  · To lower yourself, bend at the knees with a straight back. Do this instead of looking down and reaching for objects.  · Work at eye level. Dont reach above your head or tilt your  head back.  Sitting   To protect your neck while sitting:  · Set up your workstation so your monitor is at eye level. Also, use a document moreira when viewing papers or books.  · Keep your knees at or slightly below the level of your hips.  · Sit up straight, with feet flat on the floor. If your feet dont touch the floor, use a footrest.  · Avoid sitting or driving for long periods. Take frequent breaks.  Sleeping   To protect your neck while sleeping:  · Sleep on your back with a pillow under your knees, or on your side with a pillow between bent knees. This helps align the spine.  · Avoid using pillows that are too high or too low. Instead, use a neck roll or pillow under your neck while you sleep to keep the neck straight.  · Sleep on a mattress that supports you, with a pillow under your neck.  © 7544-4111 [facility], [address] [place] [address]. All rights reserved. This information is not intended as a substitute for professional medical care. Always follow your healthcare professional's instructions.        Reach and Hold Exercise    Do this exercise on your hands and knees. Keep your knees under your hips and your hands under your shoulders. Keep your spine in a neutral position (not arched or sagging). Keep your ears in line with your shoulders. Hold for a few seconds before starting the exercise.  · Tighten your abdominal muscles and raise one arm straight in front of you, palm down. Hold for 5 seconds, then lower. Repeat 5 times.  · Do the exercise again, this time lifting your arm to the side. Repeat 5 times.  · Do the exercise again, this time lifting your arm backward, palm up. Repeat 5 times.  Switch sides and do each exercise with the other arm.  © 6172-0465 [facility], [address] [place] [address]. All rights reserved. This information is not intended as a substitute for professional medical care. Always follow your healthcare professional's  instructions.        Shoulder and Upper Back Stretch  To start, stand tall with your ears, shoulders, and hips in line. Your feet should be slightly apart, positioned just under your hips. Focus your eyes directly in front of you.  this position for a few seconds before starting your exercise. This helps increase your awareness of proper posture.  · Reach overhead and slightly back with both arms. Keep your shoulders and neck aligned and your elbows behind your shoulders.  · With your palms facing the ceiling, turn your fingers inward.  · Take a deep breath. Breathe out and lower your elbows toward your buttocks. Hold for 5 seconds, then return to starting position.  · Repeat 3 times.       © 7925-7115 Katherine \[facility]", [address] [place] [address], [place] [address]. All rights reserved. This information is not intended as a substitute for professional medical care. Always follow your healthcare professional's instructions.        Torticollis (Wry Neck)  Torticollis occurs when muscles on one side of the neck contract (tighten). This causes the neck to twist or tilt to the side. The muscles may also be quite sore. It affects mainly children and young adults, often appearing overnight. It can also affect infants who develop tight neck muscles on one side.  What Causes Torticollis?  Causes of torticollis include:  · Damage to the neck muscles from an accident or other trauma  · Side effect of certain medications or drugs  · Infection of the airways, such as a sore throat  When to Go to the Emergency Room (ER)  All neck problems should be checked by a healthcare provider within 24 hours. Seek emergency care if you can't reach your doctor or these symptoms are present:  · Trouble breathing or swallowing  · Numbness or weakness in the arms and legs  · Trouble walking or speaking  What to Expect in the ER  The neck will be examined, and questions about any current or former medical problems will be asked. X-rays of the  neck may be taken to check for broken bones.  Treatment  The goal in treating torticollis is to relax the neck muscles. The best approach will depend on the cause of the problem. In most cases, one or more of the following may be given:  · Medications to help relax the muscles and reduce swelling  · Hot and cold compresses to help ease muscle tightness  · Botulinum toxin (Botox) injections to prevent further muscle spasms  · A soft neck collar to ease discomfort and help healing  · Physical therapy to help stretch and relax the muscles  Follow-up  Depending upon the cause, torticollis often goes away on its own. Follow up with your healthcare provider as instructed. If symptoms become worse, call your doctor or return to the ER.  © 0683-3737 Katherine [facility], [address] [place] [address], [place] [address]. All rights reserved. This information is not intended as a substitute for professional medical care. Always follow your healthcare professional's instructions.        Understanding Neck Problems       If you suffer from neck pain, youre not alone. Many people have neck pain at some point in their lives. Problems such as poor posture, injury, and wear and tear can lead to neck pain. Your health care provider will work with you to find the treatment thats best for your neck.  Types of Neck Problems  The following problems can cause pain or injury in your neck:  · Strains and sprains: Strains (stretched or torn muscles) and sprains (stretched or torn ligaments) can cause neck pain. Strains and sprains can occur during an accident, or when you overuse your neck through repetitive motion. They can also cause your muscles and ligaments to become inflamed (swollen and painful).  · Whiplash and other injuries: Whiplash can result when an impact throws your head, forcing your neck too far forward (hyperflexion), then too far backward (hyperextension). When combined, the two motions can cause a painful injury to different parts  of your neck, such as muscles, ligaments, or joints. The most common cause of whiplash is a car accident. But it can also happen during a fall or sports injury.  · Weakened disks: A simple action, such as a sneeze or a cough, can cause one of your disks to bulge (herniate). A herniated disk can put pressure on your nerve and cause pain. Over time, disks can also thin out (degenerate). Flattened disks dont cushion vertebrae well and can cause vertebrae to rub together. Rubbing vertebrae can pinch nerves and cause pain.  · Weakened joints: Aging and injury can cause joints to slowly degenerate. Thinned joints can also cause vertebrae to rub together. This can cause abnormal growths of bone (bone spurs) to form on vertebrae. Bone spurs put pressure on nerves, causing pain.  Common Symptoms  If you have a neck problem, you may have one or more of the following symptoms:  · Muscle tension and spasm: You may not be able to move your neck, arms, or shoulders comfortably if you have muscle tension or stiffness in your neck. If your symptoms arent relieved, you may experience muscle spasms, or knots of contracted tissue (trigger points) in areas of your neck and shoulders.  · Aches and pains: Dull aches in your head or neck, sharp pains, and swelling of the soft tissue of your neck and shoulders are common symptoms. If theres pressure on the nerves in your neck, you may feel pain in your arms or hands (referred pain).  · Numbness or weakness: If you injure the nerves in your neck, you may experience numbness, tingling, or weakness in your shoulders, arms, or hands. These symptoms arise when disks or bone spurs press on the nerves in your neck.  © 9929-1300 [facility], [address] [place] [address], [place] [address]. All rights reserved. This information is not intended as a substitute for professional medical care. Always follow your healthcare professional's instructions.        Neck Exercises: Active Neck Rotation  To  start, lie on your back, knees bent and feet flat on the floor. Keep your ears, shoulders, and hips aligned, but dont press your lower back to the floor. Rest your hands on your pelvis. Breathe deeply and relax.  · Use your neck muscles to turn your head to one side until you feel a stretch in the muscles.  · Hold for 5 seconds. Then turn to the other side.  · Repeat 5 times on each side.  Note: Keep your shoulders on the floor. Dont lift or tuck your chin as you turn your head.  © 2553-1097 Katherine [facility], [address] [place] [address]. All rights reserved. This information is not intended as a substitute for professional medical care. Always follow your healthcare professional's instructions.        Neck Exercises: Arm Lift            To start, lie on your back, knees bent and feet flat on the floor. Keep your ears, shoulders, and hips aligned, but dont press your lower back to the floor. Rest your hands on your pelvis. Breathe deeply and relax.  1. Raise one arm overhead, then lower it. As you lower that arm, raise the other arm.  2. Continue to move both arms in slow, smooth arcs. Keep your arms straight and your head and neck relaxed.  3. Repeat 10 times with each arm.  For your safety, check with your healthcare provider before starting an exercise program.   © 6668-8156 [facility], [address] [place] [address]. All rights reserved. This information is not intended as a substitute for professional medical care. Always follow your healthcare professional's instructions.        Exercises: Neck Isometrics  To start, sit in a chair with your feet flat on the floor. Your weight should be slightly forward so that youre balanced evenly on your buttocks. Relax your shoulders and keep your head level. Using a chair with arms may help you keep your balance.  4. Press your palm against your forehead. Resist with your neck muscles. Hold for 10 seconds. Relax. Repeat 5 times.  5. Do the  exercise again, pressing on the side of your head. Repeat 5 times. Switch sides.  6. Do the exercise again, pressing on the back of your head. Repeat 5 times.       For your safety, check with your healthcare provider before starting an exercise program.   © 4593-7657 [place] [address]. All rights reserved. This information is not intended as a substitute for professional medical care. Always follow your healthcare professional's instructions.        Neck Exercises: Passive Neck Rotation        To start, lie on your back, knees bent and feet flat on the floor. Keep your ears, shoulders, and hips aligned, but dont press your lower back to the floor. Rest your hands on your pelvis. Breathe deeply and relax.  · With your neck relaxed, place the palm of one hand on your forehead. Use your hand to turn your head to one side until you feel a stretch in the neck muscles. Do not push through pain.  · Hold for 5 seconds. Then turn to the other side.  · Repeat 5 times on each side.   Note: Keep your shoulders on the floor. Dont lift your chin as you turn your head.  © 5243-0289 Katherine [facility], [address] [place] [address]. All rights reserved. This information is not intended as a substitute for professional medical care. Always follow your healthcare professional's instructions.        Neck Pain [No Trauma]  There are several possible causes of neck pain without injury:  · You can get a minor ligament sprain or muscle strain from a sudden minor neck movement. Sleeping with your neck in an awkward position can also cause this.  · Some persons respond to emotional stress by tensing the muscles of their neck, shoulders and upper back. Chronic spasm in these muscles can cause neck pain and sometimes headaches.  · Gradual wear and tear of the joints in the spine can cause degenerative arthritis. This can be a source of occasional or chronic neck pain.  · With aging or  repeated small injuries to the neck, the spinal disks (the cushions between each spinal bone) may bulge and put pressure on a nearby spinal nerve. This causes tingling, pain or numbness spreading from the neck to the shoulder, arm or hand on one side.  Acute neck pain usually gets better in one to two weeks. Neck pain related to disk disease, arthritis in the spinal joints or spinal stenosis (narrowing of the spinal canal) can become chronic and last for months or years.  Unless you had a forceful physical injury (for example, a car accident or fall), X-rays are usually not ordered for the initial evaluation of neck pain. If pain continues and does not respond to medical treatment, x-rays and other tests may be performed at a later time.  Home Care:  · Rest and relax the muscles. Use a comfortable pillow that supports the head and keeps the spine in a neutral position. The position of the head should not be tilted forward or backward. A rolled up towel may help for a custom fit.  · Some persons find relief with heat (hot shower, hot bath or heating pad) and massage, while others prefer cold packs (crushed or cubed ice in a plastic bag, wrapped in a towel) . Try both and use the method that feels best for 20 minutes several times a day.  · You may use acetaminophen (Tylenol) or ibuprofen (Motrin, Advil) to control pain, unless another medicine was prescribed. [ NOTE : If you have chronic liver or kidney disease or ever had a stomach ulcer or GI bleeding, talk with your doctor before using these medicines.]  Follow Up  with your physician or this facility if your symptoms do not show signs of improvement after one week. Physical therapy or further tests may be needed.  [NOTE: A radiologist will review any X-rays or CT scans that were taken. We will notify you of any new findings that may affect your care.]  Get Prompt Medical Attention  if any of the following occur:  · Pain becomes worse or spreads into one or both  arms  · Weakness or numbness in one or both arms  · Increasing headache  · Neck swelling, difficulty or painful swallowing  · Fever of 100.4ºF (38ºC) or higher, or as directed by your healthcare provider  © 8115-9906 [facility], [address] [place] [address], [place] [address]. All rights reserved. This information is not intended as a substitute for professional medical care. Always follow your healthcare professional's instructions.      Neck Problems: Relieving Your Symptoms  The first goal of treatment is to relieve your symptoms. Your health care provider may recommend self-care treatments. These include resting, applying ice and heat, taking medication, and doing exercises. Your health care provider may also recommend that you see a physical therapist, who can teach you ways to care for and strengthen your neck.     Heat relaxes sore muscles and helps relieve spasms.   Self-Care Treatments  Pain can end quickly or last awhile. Either way, youll want relief as soon as possible. Your health care provider can tell you which treatments to do at home to help relieve your pain.  · Lying down for a short time takes pressure from the head off the neck.  · Ice and heat can help reduce pain. To bring down swelling, rest an ice pack wrapped in a thin towel on your neck for 15 minutes. To relax sore muscles, apply a warm, wet towel to the area. Or take a warm bath or shower.  · Over-the-counter medications, such as ibuprofen, naproxen, and aspirin, can help reduce pain and swelling. Acetaminophen can help relieve pain. Use these only as directed.  · Exercises can relax muscles and prevent stiffness. To prepare, drape a warm, wet towel around your neck and shoulders for 5 minutes. Remove the towel. Then do any exercises recommended to you by your health care provider.  Physical Therapy  If self-care treatments arent helping relieve neck pain, your health care provider may suggest one or more sessions of physical therapy.  Physical therapy is performed by a specialist trained to treat injuries. Your physical therapist (PT) will teach you how to strengthen muscles, improve the spines alignment, and help you move properly. Treatment methods used in physical therapy may include:  · Heat. A special heating pad called a neck pack may be applied to your neck.  · Exercises. Your PT will teach you exercises to help strengthen your neck and improve its range of motion.  · Joint mobilization. The PT gently moves your vertebrae to help restore motion in your neck joints and reduce neck pain.  · Soft tissue mobilization. The PT massages and stretches the muscles in your neck and shoulders.  · Electrical stimulation. Electrical impulses are sent into your neck. This helps reduce soreness and inflammation.  · Education in body mechanics. The PT shows you ways to position and move your body that protect the neck.  Other Treatments  If physical therapy doesnt relieve your neck pain, your health care provider may suggest other treatments. For example, medications or injections can help relieve pain and swelling. In some cases, surgery may be needed to treat neck problems.  © 7743-5554 [facility], [address] [place] [address], [place] [address]. All rights reserved. This information is not intended as a substitute for professional medical care. Always follow your healthcare professional's instructions.

## 2020-02-07 NOTE — TELEPHONE ENCOUNTER
----- Message from Ashlyn Price MA sent at 2/6/2020  2:46 PM CST -----  Contact: self [phone]      ----- Message -----  From: Rufina Camacho  Sent: 2/6/2020   1:58 PM CST  To: [facility] Staff    .Type:  RX Refill Request    Who Called: Marissa Moctezuma  Refill or New Rx:refill  RX Name and Strength:Gabapentin 800mg  How is the patient currently taking it? (ex. 1XDay):Daily  Is this a 30 day or 90 day RX:30  Preferred Pharmacy with phone number:.  [id number] [place], [place] - [address] AT On license of [facility]  [address]  [facility] [address]  Phone: [phone] Fax: [phone]      Local or Mail Order:local  Ordering Provider:Veronica Courtney  Would the patient rather a call back or a response via [facility]? Call back  Best Call Back Number:[phone]  Additional Information:

## 2020-02-17 ENCOUNTER — TELEPHONE (OUTPATIENT)
Dept: PAIN MEDICINE | Facility: CLINIC | Age: 35
End: 2020-02-17

## 2020-02-17 NOTE — TELEPHONE ENCOUNTER
----- Message from Maylin Ni sent at 2/17/2020  9:24 AM CST -----  Contact: Pt   Pt is calling regarding requesting to have nurse call pt  back. Pt states that call is in  Reference to have pt upcoming appt. And procedure. .[phone] (home)         .Thank You  Maylin Ni

## 2020-02-25 RX ORDER — MELOXICAM 7.5 MG/1
TABLET ORAL
Qty: 180 TABLET | OUTPATIENT
Start: 2020-02-25

## 2020-02-25 RX ORDER — MELOXICAM 7.5 MG/1
TABLET ORAL
Qty: 60 TABLET | Refills: 0 | Status: SHIPPED | OUTPATIENT
Start: 2020-02-25 | End: 2020-03-30

## 2020-03-17 ENCOUNTER — TELEPHONE (OUTPATIENT)
Dept: PAIN MEDICINE | Facility: CLINIC | Age: 35
End: 2020-03-17

## 2020-03-17 NOTE — TELEPHONE ENCOUNTER
Elisa called the patient and explained that her procedure was scheduled on 4.7.2020, so folow up appointment is scheduled on 5.06.2020.  Patient verbalized understanding

## 2020-03-17 NOTE — TELEPHONE ENCOUNTER
----- Message from Noris Posey sent at 3/17/2020  1:41 PM CDT -----  Contact: pt  .Type:  Sooner Apoointment Request    Caller is requesting a sooner appointment.  Caller declined first available appointment listed below.  Caller will not accept being placed on the waitlist and is requesting a message be sent to doctor.  Name of Caller: pt  When is the first available appointment? 5/6  Symptoms: injection   Would the patient rather a call back or a response via [facility]? Call back   Best Call Back Number: [phone] (home)   Additional Information:  Pt request an appt after 4/3

## 2020-03-20 ENCOUNTER — DOCUMENTATION ONLY (OUTPATIENT)
Dept: REHABILITATION | Facility: HOSPITAL | Age: 35
End: 2020-03-20

## 2020-03-20 NOTE — PROGRESS NOTES
Outpatient Therapy Discharge Summary     Name: Marissa Moctezuma  Clinic Number: [id number]    Therapy Diagnosis:        Encounter Diagnosis   Name Primary?    Acute pain of right knee Yes      Physician: Veronica Courtney PA*     Physician Orders: PT Eval and Treat   Medical Diagnosis from Referral: Acute pain of the right knee  Evaluation Date: 12/30/2019      Date of Last visit: 12/30/2019  Total Visits Received: 1  Cancelled Visits: 0  No Show Visits: 0    Assessment    Goals: Short   Term Goals: In 3 weeks:  1.I with HEP  2.Patient to demo increased right knee AROM /PROM from 100 to 115 degrees flexion  3.Patient to demo increase LE strength from *4/5 to 4+/5  4.Patient to have decreased pain to *5/10.     Long Term Goals: In 6 weeks  1. Patient to perform daily activities including community walking and ADLS without limitation.  2. Patient to demonstrate increased knee AROM/PROM to 120 degrees.  3. Patient to demonstrate increased LE strength to 5/5  4. Patient to have decreased pain to 1/10.    Discharge reason: Patient has not attended therapy since 12/30/2019    Plan   This patient is discharged from Physical Therapy

## 2020-03-30 RX ORDER — MELOXICAM 7.5 MG/1
TABLET ORAL
Qty: 60 TABLET | Refills: 0 | Status: SHIPPED | OUTPATIENT
Start: 2020-03-30 | End: 2020-05-12

## 2020-04-21 ENCOUNTER — TELEPHONE (OUTPATIENT)
Dept: OBSTETRICS AND GYNECOLOGY | Facility: CLINIC | Age: 35
End: 2020-04-21

## 2020-04-21 NOTE — TELEPHONE ENCOUNTER
----- Message from Татьяна Thomas sent at 4/21/2020 10:03 AM CDT -----  Contact: pt  Pt blanche arellano to know when she is supposed to get birth control IUD replaced and can be reached at [phone]      Thanks,  Татьяна Thomas

## 2020-04-21 NOTE — TELEPHONE ENCOUNTER
Returned call to patient and she confirmed message.  Made her aware that the Svetlana is good for

## 2020-04-21 NOTE — TELEPHONE ENCOUNTER
Returned call to patient.  She asked when should her Svetlana be replaced.  Made her aware that replacement is 3 yrs of insertion, February 2021, she verbalized understanding.  Encouraged her to schedule a wwe, she declined and said that she is fearful of the coronavirus, and will call at a later date to schedule.

## 2020-04-29 ENCOUNTER — TELEPHONE (OUTPATIENT)
Dept: PAIN MEDICINE | Facility: CLINIC | Age: 35
End: 2020-04-29

## 2020-04-29 NOTE — TELEPHONE ENCOUNTER
Pt stated she would like to be scheduled in July for procedure with  . Informed her we would call to schedule. Pt wanted to cancel for appt for 05/06. Pt verbalized understanding. All questions answered .

## 2020-04-29 NOTE — TELEPHONE ENCOUNTER
----- Message from Katherine Avila sent at 4/29/2020  2:39 PM CDT -----  Type:  Needs Medical Advice    Who Called:  Pt   Marissa  Symptoms (please be specific):   Pt is needing to reschedule her appt that is scheduled for 5-6-20  to June or July//no appts available   How long has patient had these symptoms:     Pharmacy name and phone #:     Would the patient rather a call back or a response via [facility]?    Call back  Best Call Back Number:   [phone]  Additional Information:   Please call to discuss//nisa/amanda

## 2020-05-05 RX ORDER — GABAPENTIN 800 MG/1
TABLET ORAL
Qty: 90 TABLET | Refills: 2 | Status: SHIPPED | OUTPATIENT
Start: 2020-05-05 | End: 2020-05-22

## 2020-05-12 RX ORDER — MELOXICAM 7.5 MG/1
TABLET ORAL
Qty: 180 TABLET | Refills: 0 | Status: SHIPPED | OUTPATIENT
Start: 2020-05-12 | End: 2021-07-14

## 2020-05-12 RX ORDER — MELOXICAM 7.5 MG/1
TABLET ORAL
Qty: 60 TABLET | Refills: 0 | Status: SHIPPED | OUTPATIENT
Start: 2020-05-12 | End: 2020-05-12

## 2020-05-22 RX ORDER — GABAPENTIN 800 MG/1
TABLET ORAL
Qty: 90 TABLET | Refills: 2 | Status: SHIPPED | OUTPATIENT
Start: 2020-05-22 | End: 2020-06-22

## 2020-05-22 RX ORDER — MONTELUKAST SODIUM 10 MG/1
TABLET ORAL
Qty: 30 TABLET | Refills: 3 | Status: SHIPPED | OUTPATIENT
Start: 2020-05-22 | End: 2020-10-26 | Stop reason: SDUPTHER

## 2020-05-22 NOTE — TELEPHONE ENCOUNTER
----- Message from Deisy Vaughan sent at 5/22/2020  4:31 PM CDT -----  Contact: Pt  Pt is requesting call back in regards to questions about getting medication for breast pain. Pt stated that she had ribbon taken out of breast        Pls call back at [phone]

## 2020-05-22 NOTE — TELEPHONE ENCOUNTER
Contacted pt. Pt states she went to urgent care last night for spider bite/boil. Pt states abx was given but nothing for pain. Instructed pt to reach out to urgent care who treated this to request. Pt was able to verbalize all understanding. All questions answered.//lp

## 2020-06-02 ENCOUNTER — TELEPHONE (OUTPATIENT)
Dept: INTERNAL MEDICINE | Facility: CLINIC | Age: 35
End: 2020-06-02

## 2020-06-02 NOTE — TELEPHONE ENCOUNTER
Patient states that she has been having some congestion, coughing and headaches for about 1 week. She denies fever. Please Advise

## 2020-06-03 NOTE — TELEPHONE ENCOUNTER
Then she needs to be seen. Tell her to go to an Urgent Care tonight or have her see someone else tomorrow. Not me, I am booked.

## 2020-06-03 NOTE — TELEPHONE ENCOUNTER
I spoke to patient and she said she's also used Nasacort. She said she is not any better. She would like to know if there is anything else you can do?

## 2020-06-03 NOTE — TELEPHONE ENCOUNTER
Spoke with patient and informed her of what the provider stated. Patient verbalized understanding and will go to Urgent Care

## 2020-06-03 NOTE — TELEPHONE ENCOUNTER
Spoke with patient and she stated that she is doing all of that and she is still not getting better. Please Advise

## 2020-06-03 NOTE — TELEPHONE ENCOUNTER
She should take all three, Mucinex, Zrytec, Nasacort and make sure she is taking her prescription medication montelukast.

## 2020-06-11 RX ORDER — ALBUTEROL SULFATE 90 UG/1
AEROSOL, METERED RESPIRATORY (INHALATION)
Qty: 18 G | Refills: 0 | Status: SHIPPED | OUTPATIENT
Start: 2020-06-11 | End: 2020-10-23

## 2020-06-11 NOTE — TELEPHONE ENCOUNTER
----- Message from Merary Gutiérrez sent at 6/11/2020  1:26 PM CDT -----  Contact: pt  Type:  RX Refill Request    Who Called: Patient  Refill or New Rx:Refill  RX Name and Strength:ventilium inhaler  How is the patient currently taking it? (ex. 1XDay):na  Is this a 30 day or 90 day RX:na  Preferred Pharmacy with phone number:[facility]  Local or Mail Order:local  Ordering Provider:Dr Leon  Would the patient rather a call back or a response via [facility]? Call back  Best Call Back Number:[phone]  Additional Information: andre

## 2020-06-16 ENCOUNTER — TELEPHONE (OUTPATIENT)
Dept: INTERNAL MEDICINE | Facility: CLINIC | Age: 35
End: 2020-06-16

## 2020-06-16 ENCOUNTER — OFFICE VISIT (OUTPATIENT)
Dept: INTERNAL MEDICINE | Facility: CLINIC | Age: 35
End: 2020-06-16
Payer: MEDICAID

## 2020-06-16 DIAGNOSIS — R05.9 COUGH: ICD-10-CM

## 2020-06-16 DIAGNOSIS — J02.9 SORE THROAT: Primary | ICD-10-CM

## 2020-06-16 DIAGNOSIS — M79.602 PAIN OF LEFT UPPER EXTREMITY: ICD-10-CM

## 2020-06-16 PROCEDURE — 99213 PR OFFICE/OUTPT VISIT, EST, LEVL III, 20-29 MIN: ICD-10-PCS | Mod: 95,,, | Performed by: FAMILY MEDICINE

## 2020-06-16 PROCEDURE — 99213 OFFICE O/P EST LOW 20 MIN: CPT | Mod: 95,,, | Performed by: FAMILY MEDICINE

## 2020-06-16 RX ORDER — TIZANIDINE 2 MG/1
TABLET ORAL
Qty: 30 TABLET | Refills: 0 | Status: SHIPPED | OUTPATIENT
Start: 2020-06-16 | End: 2020-07-22 | Stop reason: SDUPTHER

## 2020-06-16 NOTE — TELEPHONE ENCOUNTER
----- Message from Scott Dutton MD sent at 6/16/2020  8:57 AM CDT -----  Regarding: FW: Appt  Contact: pt  Please schedule her for virtual visit this afternoon. I cannot use the normal sharee, will be doximity so tell her she will get a text with a link to click.   ----- Message -----  From: Ekaterina Garcia LPN  Sent: 6/16/2020   8:45 AM CDT  To: Scott Dutton MD  Subject: FW: Appt                                           ----- Message -----  From: Hali Chamorro  Sent: 6/16/2020   7:51 AM CDT  To: [facility] Staff  Subject: Appt                                             Type:  Same Day Appointment Request    Caller is requesting a same day appointment.  Caller declined first available appointment listed below.    Name of Caller: pt  When is the first available appointment? 07/02/2020  Symptoms: throat / shoulder pain  Best Call Back Number: [phone]  Additional Information: pt

## 2020-06-16 NOTE — TELEPHONE ENCOUNTER
----- Message from Lashawn Shen sent at 6/16/2020 10:27 AM CDT -----  Regarding: VIRTUAL APPT  PATIENT IS NOT SET UIP FOR VIRTUAL. REQUESTING A PHONE VISIT. PLEASE CALL PATIENT WHEN READY @ [phone]. THANKS

## 2020-06-16 NOTE — Clinical Note
Please remind patient to get tested for covid, explain where to go for esparza testing.   Also please let her know I sent in rx for zanaflex to help with her arm pain.
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
## 2020-06-16 NOTE — PROGRESS NOTES
Patient said [facility] She said she will find a ride and she will go. I also informed her of medication sent to pharmacy.

## 2020-07-22 RX ORDER — TIZANIDINE 2 MG/1
TABLET ORAL
Qty: 30 TABLET | Refills: 0 | Status: SHIPPED | OUTPATIENT
Start: 2020-07-22 | End: 2020-11-06 | Stop reason: SDUPTHER

## 2020-07-27 ENCOUNTER — TELEPHONE (OUTPATIENT)
Dept: PAIN MEDICINE | Facility: CLINIC | Age: 35
End: 2020-07-27

## 2020-07-27 NOTE — TELEPHONE ENCOUNTER
----- Message from Dorinda Pierre sent at 7/27/2020  9:10 AM CDT -----  ..Type:  RX Refill Request    Who Called: pt  Refill or New Rx refill  RX Name and Strength: gabapinton 800 mg   How is the patient currently taking it? (ex. 1XDay)  Is this a 30 day or 90 day RX 30  Preferred Pharmacy with phone number: ..Type: .  [id number] [address] [facility] [address] [facility] [address]  [address]  [facility] [address]  Phone: [phone] Fax: [phone]       Local or Mail Order local   Ordering Provider   Would the patient rather a call back or a response via [facility]? Call back   Best Call Back Number: [phone]  Additional Information:  refill

## 2020-08-19 ENCOUNTER — TELEPHONE (OUTPATIENT)
Dept: PAIN MEDICINE | Facility: CLINIC | Age: 35
End: 2020-08-19

## 2020-08-19 RX ORDER — GABAPENTIN 800 MG/1
800 TABLET ORAL 3 TIMES DAILY
Qty: 90 TABLET | Refills: 1 | Status: SHIPPED | OUTPATIENT
Start: 2020-08-19 | End: 2020-10-12 | Stop reason: SDUPTHER

## 2020-08-27 ENCOUNTER — TELEPHONE (OUTPATIENT)
Dept: PAIN MEDICINE | Facility: CLINIC | Age: 35
End: 2020-08-27

## 2020-08-27 NOTE — TELEPHONE ENCOUNTER
----- Message from Analia Hollis sent at 8/27/2020 10:25 AM CDT -----  Regarding: sooner appt alert  Type:  Sooner Apoointment Request    Caller is requesting a sooner appointment.  Caller declined first available appointment listed below.  Caller will not accept being placed on the waitlist and is requesting a message be sent to doctor.  Name of Caller:pt  When is the first available appointment?09/21/2020  Symptoms:n/a  Would the patient rather a call back or a response via [facility]? Call back  Best Call Back Number:[phone]  Additional Information: Pt states that she received an alert for sooner appt. Thanks

## 2020-09-08 ENCOUNTER — TELEPHONE (OUTPATIENT)
Dept: PAIN MEDICINE | Facility: CLINIC | Age: 35
End: 2020-09-08

## 2020-09-08 NOTE — TELEPHONE ENCOUNTER
----- Message from Hali Chamorro sent at 9/8/2020 12:52 PM CDT -----  Contact: pt  The pt request a return call concerning a trigger point injection, no additional info given and can be reached at [phone]///thxMW

## 2020-09-14 ENCOUNTER — TELEPHONE (OUTPATIENT)
Dept: PAIN MEDICINE | Facility: CLINIC | Age: 35
End: 2020-09-14

## 2020-09-14 ENCOUNTER — PATIENT OUTREACH (OUTPATIENT)
Dept: ADMINISTRATIVE | Facility: OTHER | Age: 35
End: 2020-09-14

## 2020-10-12 ENCOUNTER — OFFICE VISIT (OUTPATIENT)
Dept: PAIN MEDICINE | Facility: CLINIC | Age: 35
End: 2020-10-12
Payer: MEDICAID

## 2020-10-12 VITALS
WEIGHT: 211 LBS | DIASTOLIC BLOOD PRESSURE: 95 MMHG | RESPIRATION RATE: 18 BRPM | SYSTOLIC BLOOD PRESSURE: 132 MMHG | HEIGHT: 61 IN | BODY MASS INDEX: 39.84 KG/M2 | HEART RATE: 110 BPM

## 2020-10-12 DIAGNOSIS — M79.18 MYOFASCIAL PAIN: ICD-10-CM

## 2020-10-12 DIAGNOSIS — G89.21 CHRONIC PAIN DUE TO TRAUMA: ICD-10-CM

## 2020-10-12 DIAGNOSIS — M46.1 SACROILIITIS: Primary | ICD-10-CM

## 2020-10-12 DIAGNOSIS — M47.817 SPONDYLOSIS OF LUMBOSACRAL REGION WITHOUT MYELOPATHY OR RADICULOPATHY: ICD-10-CM

## 2020-10-12 PROCEDURE — 99999 PR PBB SHADOW E&M-EST. PATIENT-LVL III: CPT | Mod: PBBFAC,,, | Performed by: PHYSICIAN ASSISTANT

## 2020-10-12 PROCEDURE — 99214 OFFICE O/P EST MOD 30 MIN: CPT | Mod: S$PBB,,, | Performed by: PHYSICIAN ASSISTANT

## 2020-10-12 PROCEDURE — 99213 OFFICE O/P EST LOW 20 MIN: CPT | Mod: PBBFAC | Performed by: PHYSICIAN ASSISTANT

## 2020-10-12 PROCEDURE — 99214 PR OFFICE/OUTPT VISIT, EST, LEVL IV, 30-39 MIN: ICD-10-PCS | Mod: S$PBB,,, | Performed by: PHYSICIAN ASSISTANT

## 2020-10-12 PROCEDURE — 99999 PR PBB SHADOW E&M-EST. PATIENT-LVL III: ICD-10-PCS | Mod: PBBFAC,,, | Performed by: PHYSICIAN ASSISTANT

## 2020-10-12 RX ORDER — KETOROLAC TROMETHAMINE 30 MG/ML
30 INJECTION, SOLUTION INTRAMUSCULAR; INTRAVENOUS
Status: COMPLETED | OUTPATIENT
Start: 2020-10-12 | End: 2020-10-12

## 2020-10-12 RX ORDER — GABAPENTIN 800 MG/1
800 TABLET ORAL 3 TIMES DAILY
Qty: 90 TABLET | Refills: 2 | Status: SHIPPED | OUTPATIENT
Start: 2020-10-12 | End: 2021-01-08 | Stop reason: SDUPTHER

## 2020-10-12 RX ADMIN — KETOROLAC TROMETHAMINE 30 MG: 30 INJECTION, SOLUTION INTRAMUSCULAR; INTRAVENOUS at 01:10
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
## 2020-10-13 ENCOUNTER — PATIENT MESSAGE (OUTPATIENT)
Dept: PAIN MEDICINE | Facility: CLINIC | Age: 35
End: 2020-10-13

## 2020-10-13 ENCOUNTER — TELEPHONE (OUTPATIENT)
Dept: PAIN MEDICINE | Facility: CLINIC | Age: 35
End: 2020-10-13

## 2020-10-13 NOTE — TELEPHONE ENCOUNTER
----- Message from Elda Rehman sent at 10/13/2020  7:59 AM CDT -----  Contact: PHI DOYLE [[id number]] @ [phone]  Patient would like to know when she is to return for a f/u visit?

## 2020-10-22 ENCOUNTER — OFFICE VISIT (OUTPATIENT)
Dept: INTERNAL MEDICINE | Facility: CLINIC | Age: 35
End: 2020-10-22
Payer: MEDICAID

## 2020-10-22 ENCOUNTER — LAB VISIT (OUTPATIENT)
Dept: LAB | Facility: HOSPITAL | Age: 35
End: 2020-10-22
Attending: FAMILY MEDICINE
Payer: MEDICAID

## 2020-10-22 VITALS
WEIGHT: 214.06 LBS | HEART RATE: 108 BPM | OXYGEN SATURATION: 95 % | BODY MASS INDEX: 40.42 KG/M2 | TEMPERATURE: 99 F | HEIGHT: 61 IN | SYSTOLIC BLOOD PRESSURE: 129 MMHG | DIASTOLIC BLOOD PRESSURE: 86 MMHG

## 2020-10-22 DIAGNOSIS — Z00.00 ROUTINE ADULT HEALTH MAINTENANCE: ICD-10-CM

## 2020-10-22 DIAGNOSIS — G89.29 CHRONIC BILATERAL LOW BACK PAIN, UNSPECIFIED WHETHER SCIATICA PRESENT: ICD-10-CM

## 2020-10-22 DIAGNOSIS — M54.50 CHRONIC BILATERAL LOW BACK PAIN, UNSPECIFIED WHETHER SCIATICA PRESENT: ICD-10-CM

## 2020-10-22 DIAGNOSIS — Z00.00 ROUTINE ADULT HEALTH MAINTENANCE: Primary | ICD-10-CM

## 2020-10-22 LAB
ALBUMIN SERPL BCP-MCNC: 4.1 G/DL (ref 3.5–5.2)
ALP SERPL-CCNC: 97 U/L (ref 55–135)
ALT SERPL W/O P-5'-P-CCNC: 59 U/L (ref 10–44)
ANION GAP SERPL CALC-SCNC: 10 MMOL/L (ref 8–16)
AST SERPL-CCNC: 38 U/L (ref 10–40)
BASOPHILS # BLD AUTO: 0.05 K/UL (ref 0–0.2)
BASOPHILS NFR BLD: 0.5 % (ref 0–1.9)
BILIRUB SERPL-MCNC: 0.5 MG/DL (ref 0.1–1)
BUN SERPL-MCNC: 12 MG/DL (ref 6–20)
CALCIUM SERPL-MCNC: 9.2 MG/DL (ref 8.7–10.5)
CHLORIDE SERPL-SCNC: 108 MMOL/L (ref 95–110)
CHOLEST SERPL-MCNC: 196 MG/DL (ref 120–199)
CHOLEST/HDLC SERPL: 4.3 {RATIO} (ref 2–5)
CO2 SERPL-SCNC: 26 MMOL/L (ref 23–29)
CREAT SERPL-MCNC: 0.9 MG/DL (ref 0.5–1.4)
DIFFERENTIAL METHOD: ABNORMAL
EOSINOPHIL # BLD AUTO: 0.1 K/UL (ref 0–0.5)
EOSINOPHIL NFR BLD: 0.7 % (ref 0–8)
ERYTHROCYTE [DISTWIDTH] IN BLOOD BY AUTOMATED COUNT: 12.2 % (ref 11.5–14.5)
EST. GFR  (AFRICAN AMERICAN): >60 ML/MIN/1.73 M^2
EST. GFR  (NON AFRICAN AMERICAN): >60 ML/MIN/1.73 M^2
GLUCOSE SERPL-MCNC: 94 MG/DL (ref 70–110)
HCT VFR BLD AUTO: 47 % (ref 37–48.5)
HDLC SERPL-MCNC: 46 MG/DL (ref 40–75)
HDLC SERPL: 23.5 % (ref 20–50)
HGB BLD-MCNC: 14.6 G/DL (ref 12–16)
IMM GRANULOCYTES # BLD AUTO: 0.02 K/UL (ref 0–0.04)
IMM GRANULOCYTES NFR BLD AUTO: 0.2 % (ref 0–0.5)
LDLC SERPL CALC-MCNC: 121.4 MG/DL (ref 63–159)
LYMPHOCYTES # BLD AUTO: 3.4 K/UL (ref 1–4.8)
LYMPHOCYTES NFR BLD: 32.8 % (ref 18–48)
MCH RBC QN AUTO: 30.8 PG (ref 27–31)
MCHC RBC AUTO-ENTMCNC: 31.1 G/DL (ref 32–36)
MCV RBC AUTO: 99 FL (ref 82–98)
MONOCYTES # BLD AUTO: 1.1 K/UL (ref 0.3–1)
MONOCYTES NFR BLD: 11 % (ref 4–15)
NEUTROPHILS # BLD AUTO: 5.7 K/UL (ref 1.8–7.7)
NEUTROPHILS NFR BLD: 54.8 % (ref 38–73)
NONHDLC SERPL-MCNC: 150 MG/DL
NRBC BLD-RTO: 0 /100 WBC
PLATELET # BLD AUTO: 248 K/UL (ref 150–350)
PMV BLD AUTO: 11.4 FL (ref 9.2–12.9)
POTASSIUM SERPL-SCNC: 4.2 MMOL/L (ref 3.5–5.1)
PROT SERPL-MCNC: 7.3 G/DL (ref 6–8.4)
RBC # BLD AUTO: 4.74 M/UL (ref 4–5.4)
SODIUM SERPL-SCNC: 144 MMOL/L (ref 136–145)
TRIGL SERPL-MCNC: 143 MG/DL (ref 30–150)
TSH SERPL DL<=0.005 MIU/L-ACNC: 1.77 UIU/ML (ref 0.4–4)
WBC # BLD AUTO: 10.31 K/UL (ref 3.9–12.7)

## 2020-10-22 PROCEDURE — 90686 IIV4 VACC NO PRSV 0.5 ML IM: CPT | Mod: PBBFAC,PO

## 2020-10-22 PROCEDURE — 85025 COMPLETE CBC W/AUTO DIFF WBC: CPT

## 2020-10-22 PROCEDURE — 36415 COLL VENOUS BLD VENIPUNCTURE: CPT | Mod: PO

## 2020-10-22 PROCEDURE — 99999 PR PBB SHADOW E&M-EST. PATIENT-LVL IV: ICD-10-PCS | Mod: PBBFAC,,, | Performed by: FAMILY MEDICINE

## 2020-10-22 PROCEDURE — 84443 ASSAY THYROID STIM HORMONE: CPT

## 2020-10-22 PROCEDURE — 80053 COMPREHEN METABOLIC PANEL: CPT

## 2020-10-22 PROCEDURE — 80061 LIPID PANEL: CPT

## 2020-10-22 PROCEDURE — 99214 OFFICE O/P EST MOD 30 MIN: CPT | Mod: S$PBB,,, | Performed by: FAMILY MEDICINE

## 2020-10-22 PROCEDURE — 83036 HEMOGLOBIN GLYCOSYLATED A1C: CPT

## 2020-10-22 PROCEDURE — 99999 PR PBB SHADOW E&M-EST. PATIENT-LVL IV: CPT | Mod: PBBFAC,,, | Performed by: FAMILY MEDICINE

## 2020-10-22 PROCEDURE — 90472 IMMUNIZATION ADMIN EACH ADD: CPT | Mod: PBBFAC,PO

## 2020-10-22 PROCEDURE — 99214 PR OFFICE/OUTPT VISIT, EST, LEVL IV, 30-39 MIN: ICD-10-PCS | Mod: S$PBB,,, | Performed by: FAMILY MEDICINE

## 2020-10-22 PROCEDURE — 99214 OFFICE O/P EST MOD 30 MIN: CPT | Mod: PBBFAC,PO,25 | Performed by: FAMILY MEDICINE

## 2020-10-22 PROCEDURE — 86803 HEPATITIS C AB TEST: CPT
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
## 2020-10-23 ENCOUNTER — TELEPHONE (OUTPATIENT)
Dept: INTERNAL MEDICINE | Facility: CLINIC | Age: 35
End: 2020-10-23

## 2020-10-23 LAB
ESTIMATED AVG GLUCOSE: 105 MG/DL (ref 68–131)
HBA1C MFR BLD HPLC: 5.3 % (ref 4–5.6)
HCV AB SERPL QL IA: NEGATIVE

## 2020-10-23 NOTE — TELEPHONE ENCOUNTER
----- Message from Scott Dutton MD sent at 10/23/2020  1:05 PM CDT -----  Regarding: FW: referral  Please notify patient she will need to check with her insurance to see if they have anyone locally who accepts her insurance.   ----- Message -----  From: Gabriella Arreola MA  Sent: 10/23/2020  12:50 PM CDT  To: Scott Dutton MD  Subject: referral                                         We do not see Medicaid for bariatric services. You may send a referral to [facility] or the patient can contact her insurance company. Referral will be removed from the system

## 2020-10-26 ENCOUNTER — TELEPHONE (OUTPATIENT)
Dept: INTERNAL MEDICINE | Facility: CLINIC | Age: 35
End: 2020-10-26

## 2020-10-26 RX ORDER — MONTELUKAST SODIUM 10 MG/1
10 TABLET ORAL NIGHTLY
Qty: 30 TABLET | Refills: 3 | Status: SHIPPED | OUTPATIENT
Start: 2020-10-26 | End: 2021-03-10 | Stop reason: SDUPTHER

## 2020-10-26 NOTE — TELEPHONE ENCOUNTER
----- Message from Denisse Oglesby sent at 10/26/2020 11:48 AM CDT -----  Contact: Leanne  Type:  Test Results    Who Called:  Marissa  Name of Test (Lab/Mammo/Etc): Labs  Date of Test:  10/22/2020  Ordering Provider: Dr. Dutton  Where the test was performed: [facility]  Would the patient rather a call back or a response via [facility]? Call back  Best Call Back Number:  Please call her at [phone]  Additional Information:  n/a

## 2020-10-26 NOTE — TELEPHONE ENCOUNTER
----- Message from Dorinda Pierre sent at 10/26/2020  7:16 AM CDT -----  ..Type:  Patient Returning Call    Who Called pt   Who Left Message for Patient:   Does the patient know what this is regarding?: results   Would the patient rather a call back or a response via [facility]? Call back   Best Call Back Number: [phone]  Additional Information:Pt is requesting a call from nurse to review labs results

## 2020-10-26 NOTE — TELEPHONE ENCOUNTER
----- Message from Татьяна Thomas sent at 10/26/2020 12:58 PM CDT -----  Regarding: results  Good afternoon,      Pt would like a call back in regards to labs and can be reached at [phone]      Thanks,  Татьяна Thomas

## 2020-10-26 NOTE — TELEPHONE ENCOUNTER
----- Message from Elise Austin sent at 10/26/2020  8:11 AM CDT -----  Contact: pt  Type:  Test Results    Who Called: Marissa  Name of Test (Lab/Mammo/Etc): labs  Date of Test: 10/22/2020  Ordering Provider: Marija  Where the test was performed[facility]  Would the patient rather a call back or a response via [facility]? call  Best Call Back Number: [phone]  Additional Information:

## 2020-10-27 ENCOUNTER — TELEPHONE (OUTPATIENT)
Dept: PAIN MEDICINE | Facility: CLINIC | Age: 35
End: 2020-10-27

## 2020-10-27 ENCOUNTER — TELEPHONE (OUTPATIENT)
Dept: INTERNAL MEDICINE | Facility: CLINIC | Age: 35
End: 2020-10-27

## 2020-10-27 NOTE — TELEPHONE ENCOUNTER
----- Message from Parul Love sent at 10/27/2020 12:38 PM CDT -----  Regarding: Lab results  Contact: Patient  Please call patient concerning her 10/22/20 lab results at Ph .[phone] (home)

## 2020-10-27 NOTE — TELEPHONE ENCOUNTER
----- Message from Aracelis Zapata sent at 10/27/2020  9:56 AM CDT -----  .Type:  Test Results    Who Called: pt  Name of Test (Lab/Mammo/Etc): labs  Date of Test: 10/22/20  Ordering Provider: Marija  Where the test was performed: [facility]  Would the patient rather a call back or a response via [facility]? Call back  Best Call Back Number: [phone]  Additional Information:

## 2020-10-27 NOTE — TELEPHONE ENCOUNTER
----- Message from Amber Weeks sent at 10/27/2020 12:23 PM CDT -----  .Type:  Sooner Apoointment Request    Caller is requesting a sooner appointment.  Caller declined first available appointment listed below.  Caller will not accept being placed on the waitlist and is requesting a message be sent to doctor.  Name of Caller: PHI DOYLE   When is the first available appointment  Symptoms:  Would the patient rather a call back or a response via [facility]?   Call   Best Call Back Number: [phone] (home)    Additional Information: Pt is requesting a call back from the nurse in regards to the pt scheduling her trigger injections please

## 2020-10-27 NOTE — TELEPHONE ENCOUNTER
Pt wanted to let you know that she researching bariatric sx and when she does she will let you know;

## 2020-10-28 ENCOUNTER — TELEPHONE (OUTPATIENT)
Dept: PAIN MEDICINE | Facility: CLINIC | Age: 35
End: 2020-10-28

## 2020-10-28 DIAGNOSIS — M46.1 SACROILIITIS: Primary | ICD-10-CM

## 2020-10-28 NOTE — TELEPHONE ENCOUNTER
----- Message from Dorinda Pierre sent at 10/28/2020 11:49 AM CDT -----  ..Type:  Patient Returning Call    Who Called: pt   Who Left Message for Patient:  Does the patient know what this is regarding? Injections   Would the patient rather a call back or a response via [facility]? Call back a  Best Call Back Number: [phone]  Additional Information: Pt is requesting a call from nurse to discuss scheduling injections

## 2020-10-28 NOTE — TELEPHONE ENCOUNTER
----- Message from Aracelis Zapata sent at 10/28/2020 12:52 PM CDT -----  Would like to consult with nurse regarding something personal, will not give any additional information. Please give a call back at [phone].

## 2020-11-05 RX ORDER — TIZANIDINE 2 MG/1
TABLET ORAL
Qty: 30 TABLET | Refills: 0 | OUTPATIENT
Start: 2020-11-05

## 2020-11-06 RX ORDER — TIZANIDINE 2 MG/1
TABLET ORAL
Qty: 30 TABLET | Refills: 0 | Status: SHIPPED | OUTPATIENT
Start: 2020-11-06 | End: 2020-11-09 | Stop reason: SDUPTHER

## 2020-11-09 RX ORDER — TIZANIDINE 2 MG/1
TABLET ORAL
Qty: 30 TABLET | Refills: 0 | Status: SHIPPED | OUTPATIENT
Start: 2020-11-09 | End: 2020-12-08 | Stop reason: SDUPTHER

## 2020-11-11 ENCOUNTER — PATIENT OUTREACH (OUTPATIENT)
Dept: ADMINISTRATIVE | Facility: OTHER | Age: 35
End: 2020-11-11

## 2020-11-11 NOTE — PROGRESS NOTES
Health Maintenance Due   Topic Date Due    TETANUS VACCINE  09/28/2001    Cervical Cancer Screening  03/13/2020     Updates were requested from care everywhere.  Chart was reviewed for overdue Proactive [facility] Encounters (CRISTIN) topics (CRS, Breast Cancer Screening, Eye exam)  Health Maintenance has been updated.  LINKS immunization registry triggered.  LINKS not responding.

## 2020-11-12 ENCOUNTER — OFFICE VISIT (OUTPATIENT)
Dept: PAIN MEDICINE | Facility: CLINIC | Age: 35
End: 2020-11-12
Payer: MEDICAID

## 2020-11-12 VITALS
HEART RATE: 97 BPM | BODY MASS INDEX: 40.4 KG/M2 | SYSTOLIC BLOOD PRESSURE: 136 MMHG | HEIGHT: 61 IN | WEIGHT: 214 LBS | RESPIRATION RATE: 18 BRPM | DIASTOLIC BLOOD PRESSURE: 97 MMHG

## 2020-11-12 DIAGNOSIS — G89.21 CHRONIC PAIN DUE TO TRAUMA: ICD-10-CM

## 2020-11-12 DIAGNOSIS — M79.18 LUMBAR MUSCLE PAIN: Primary | ICD-10-CM

## 2020-11-12 DIAGNOSIS — M46.1 SACROILIITIS: ICD-10-CM

## 2020-11-12 DIAGNOSIS — M53.3 SACROILIAC JOINT PAIN: ICD-10-CM

## 2020-11-12 PROCEDURE — 20553 NJX 1/MLT TRIGGER POINTS 3/>: CPT | Mod: PBBFAC | Performed by: PHYSICIAN ASSISTANT

## 2020-11-12 PROCEDURE — 99214 PR OFFICE/OUTPT VISIT, EST, LEVL IV, 30-39 MIN: ICD-10-PCS | Mod: S$PBB,25,, | Performed by: PHYSICIAN ASSISTANT

## 2020-11-12 PROCEDURE — 99213 OFFICE O/P EST LOW 20 MIN: CPT | Mod: PBBFAC | Performed by: PHYSICIAN ASSISTANT

## 2020-11-12 PROCEDURE — 20553 NJX 1/MLT TRIGGER POINTS 3/>: CPT | Mod: S$PBB,,, | Performed by: PHYSICIAN ASSISTANT

## 2020-11-12 PROCEDURE — 20553 PR INJECT TRIGGER POINTS, > 3: ICD-10-PCS | Mod: S$PBB,,, | Performed by: PHYSICIAN ASSISTANT

## 2020-11-12 PROCEDURE — 99999 PR PBB SHADOW E&M-EST. PATIENT-LVL III: ICD-10-PCS | Mod: PBBFAC,,, | Performed by: PHYSICIAN ASSISTANT

## 2020-11-12 PROCEDURE — 99999 PR PBB SHADOW E&M-EST. PATIENT-LVL III: CPT | Mod: PBBFAC,,, | Performed by: PHYSICIAN ASSISTANT

## 2020-11-12 PROCEDURE — 99214 OFFICE O/P EST MOD 30 MIN: CPT | Mod: S$PBB,25,, | Performed by: PHYSICIAN ASSISTANT

## 2020-11-12 RX ORDER — METHYLPREDNISOLONE ACETATE 40 MG/ML
40 INJECTION, SUSPENSION INTRA-ARTICULAR; INTRALESIONAL; INTRAMUSCULAR; SOFT TISSUE
Status: COMPLETED | OUTPATIENT
Start: 2020-11-12 | End: 2020-11-12

## 2020-11-12 RX ADMIN — METHYLPREDNISOLONE ACETATE 40 MG: 40 INJECTION, SUSPENSION INTRA-ARTICULAR; INTRALESIONAL; INTRAMUSCULAR; SOFT TISSUE at 01:11
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
## 2020-11-24 RX ORDER — IBUPROFEN 600 MG/1
600 TABLET ORAL 2 TIMES DAILY PRN
Qty: 60 TABLET | Refills: 0 | OUTPATIENT
Start: 2020-11-24

## 2020-11-24 NOTE — TELEPHONE ENCOUNTER
----- Message from Juan Daniel Medrano sent at 11/24/2020 11:34 AM CST -----  Contact: self  Type:  RX Refill Request    Who Called: Marissa Moctezuma   Refill or New Rx:refill  RX Name and Strength: mg  How is the patient currently taking it? (ex. 1XDay):2XDay  Is this a 30 day or 90 day RX:30  Preferred Pharmacy with phone number:  [id number] [place], [place] - [address] AT [facility] [address] [facility]  [address]  [facility] [address]  Phone: [phone] Fax: [phone]  Local or Mail Order:local  Ordering Provider:Blanche  Would the patient rather a call back or a response via [facility]? Call back  Best Call Back Number:[phone]  Additional Information:

## 2020-12-08 RX ORDER — TIZANIDINE 2 MG/1
TABLET ORAL
Qty: 30 TABLET | Refills: 6 | Status: SHIPPED | OUTPATIENT
Start: 2020-12-08 | End: 2021-01-22

## 2020-12-15 ENCOUNTER — TELEPHONE (OUTPATIENT)
Dept: PAIN MEDICINE | Facility: CLINIC | Age: 35
End: 2020-12-15

## 2020-12-15 NOTE — TELEPHONE ENCOUNTER
Spoke with Mrs Moctezuma she requested an appt 1/8/21 for trigger point injections in clinic also a refill on gabapentin on that visit the appt has been scheduled pt verbalized all date , time and location          ----- Message from Analia Hollis sent at 12/15/2020  1:41 PM CST -----  Regarding: joint injection  Type:  Sooner Apoointment Request    Caller is requesting a sooner appointment.  Caller declined first available appointment listed below.  Caller will not accept being placed on the waitlist and is requesting a message be sent to doctor.  Name of Caller:pt  When is the first available appointment?n/a  Symptoms:n/a  Would the patient rather a call back or a response via [facility]? Call back   Best Call Back Number:[phone]  Additional Information: Pt is requesting to have it done in January. Please call back.Thanks

## 2021-01-07 ENCOUNTER — PATIENT OUTREACH (OUTPATIENT)
Dept: ADMINISTRATIVE | Facility: OTHER | Age: 36
End: 2021-01-07

## 2021-01-08 RX ORDER — GABAPENTIN 800 MG/1
800 TABLET ORAL 3 TIMES DAILY
Qty: 90 TABLET | Refills: 0 | Status: SHIPPED | OUTPATIENT
Start: 2021-01-08 | End: 2021-01-29 | Stop reason: SDUPTHER

## 2021-01-12 ENCOUNTER — TELEPHONE (OUTPATIENT)
Dept: OBSTETRICS AND GYNECOLOGY | Facility: CLINIC | Age: 36
End: 2021-01-12

## 2021-01-15 ENCOUNTER — TELEPHONE (OUTPATIENT)
Dept: PAIN MEDICINE | Facility: CLINIC | Age: 36
End: 2021-01-15

## 2021-01-18 ENCOUNTER — PATIENT MESSAGE (OUTPATIENT)
Dept: PAIN MEDICINE | Facility: CLINIC | Age: 36
End: 2021-01-18

## 2021-01-18 ENCOUNTER — PATIENT MESSAGE (OUTPATIENT)
Dept: OBSTETRICS AND GYNECOLOGY | Facility: CLINIC | Age: 36
End: 2021-01-18

## 2021-01-19 ENCOUNTER — PATIENT MESSAGE (OUTPATIENT)
Dept: OBSTETRICS AND GYNECOLOGY | Facility: CLINIC | Age: 36
End: 2021-01-19

## 2021-01-19 ENCOUNTER — TELEPHONE (OUTPATIENT)
Dept: OBSTETRICS AND GYNECOLOGY | Facility: CLINIC | Age: 36
End: 2021-01-19

## 2021-01-22 ENCOUNTER — OFFICE VISIT (OUTPATIENT)
Dept: OBSTETRICS AND GYNECOLOGY | Facility: CLINIC | Age: 36
End: 2021-01-22
Payer: MEDICAID

## 2021-01-22 VITALS
DIASTOLIC BLOOD PRESSURE: 82 MMHG | BODY MASS INDEX: 39.21 KG/M2 | HEIGHT: 61 IN | WEIGHT: 207.69 LBS | SYSTOLIC BLOOD PRESSURE: 114 MMHG

## 2021-01-22 DIAGNOSIS — Z97.5 CONTRACEPTIVE DEVICE, INTRAUTERINE: Primary | ICD-10-CM

## 2021-01-22 PROCEDURE — 99213 OFFICE O/P EST LOW 20 MIN: CPT | Mod: PBBFAC | Performed by: OBSTETRICS & GYNECOLOGY

## 2021-01-22 PROCEDURE — 99999 PR PBB SHADOW E&M-EST. PATIENT-LVL III: ICD-10-PCS | Mod: PBBFAC,,, | Performed by: OBSTETRICS & GYNECOLOGY

## 2021-01-22 PROCEDURE — 99499 NO LOS: ICD-10-PCS | Mod: S$PBB,,, | Performed by: OBSTETRICS & GYNECOLOGY

## 2021-01-22 PROCEDURE — 99999 PR PBB SHADOW E&M-EST. PATIENT-LVL III: CPT | Mod: PBBFAC,,, | Performed by: OBSTETRICS & GYNECOLOGY

## 2021-01-22 PROCEDURE — 99499 UNLISTED E&M SERVICE: CPT | Mod: S$PBB,,, | Performed by: OBSTETRICS & GYNECOLOGY

## 2021-01-29 ENCOUNTER — OFFICE VISIT (OUTPATIENT)
Dept: PAIN MEDICINE | Facility: CLINIC | Age: 36
End: 2021-01-29
Payer: MEDICAID

## 2021-01-29 VITALS
HEIGHT: 61 IN | WEIGHT: 215.19 LBS | SYSTOLIC BLOOD PRESSURE: 126 MMHG | BODY MASS INDEX: 40.63 KG/M2 | HEART RATE: 109 BPM | DIASTOLIC BLOOD PRESSURE: 88 MMHG

## 2021-01-29 DIAGNOSIS — M53.3 SACROILIAC JOINT PAIN: ICD-10-CM

## 2021-01-29 DIAGNOSIS — M46.1 SACROILIITIS: ICD-10-CM

## 2021-01-29 DIAGNOSIS — M79.18 LUMBAR MUSCLE PAIN: Primary | ICD-10-CM

## 2021-01-29 DIAGNOSIS — Z98.890 HISTORY OF LUMBAR SURGERY: ICD-10-CM

## 2021-01-29 PROCEDURE — 20553 NJX 1/MLT TRIGGER POINTS 3/>: CPT | Mod: PBBFAC | Performed by: PHYSICIAN ASSISTANT

## 2021-01-29 PROCEDURE — 99999 PR PBB SHADOW E&M-EST. PATIENT-LVL III: ICD-10-PCS | Mod: PBBFAC,,, | Performed by: PHYSICIAN ASSISTANT

## 2021-01-29 PROCEDURE — 20553 NJX 1/MLT TRIGGER POINTS 3/>: CPT | Mod: S$PBB,,, | Performed by: PHYSICIAN ASSISTANT

## 2021-01-29 PROCEDURE — 99213 OFFICE O/P EST LOW 20 MIN: CPT | Mod: PBBFAC | Performed by: PHYSICIAN ASSISTANT

## 2021-01-29 PROCEDURE — 99214 OFFICE O/P EST MOD 30 MIN: CPT | Mod: S$PBB,25,, | Performed by: PHYSICIAN ASSISTANT

## 2021-01-29 PROCEDURE — 20553 PR INJECT TRIGGER POINTS, > 3: ICD-10-PCS | Mod: S$PBB,,, | Performed by: PHYSICIAN ASSISTANT

## 2021-01-29 PROCEDURE — 99214 PR OFFICE/OUTPT VISIT, EST, LEVL IV, 30-39 MIN: ICD-10-PCS | Mod: S$PBB,25,, | Performed by: PHYSICIAN ASSISTANT

## 2021-01-29 PROCEDURE — 99999 PR PBB SHADOW E&M-EST. PATIENT-LVL III: CPT | Mod: PBBFAC,,, | Performed by: PHYSICIAN ASSISTANT

## 2021-01-29 RX ORDER — GABAPENTIN 800 MG/1
800 TABLET ORAL 3 TIMES DAILY
Qty: 90 TABLET | Refills: 1 | Status: SHIPPED | OUTPATIENT
Start: 2021-01-29 | End: 2021-04-07 | Stop reason: SDUPTHER

## 2021-01-29 RX ORDER — LIDOCAINE HYDROCHLORIDE 10 MG/ML
1 INJECTION INFILTRATION; PERINEURAL ONCE
Qty: 1 ML | Refills: 0 | Status: SHIPPED | OUTPATIENT
Start: 2021-01-29 | End: 2021-01-29

## 2021-01-29 RX ORDER — METHYLPREDNISOLONE ACETATE 40 MG/ML
40 INJECTION, SUSPENSION INTRA-ARTICULAR; INTRALESIONAL; INTRAMUSCULAR; SOFT TISSUE
Status: COMPLETED | OUTPATIENT
Start: 2021-01-29 | End: 2021-01-29

## 2021-01-29 RX ADMIN — METHYLPREDNISOLONE ACETATE 40 MG: 40 INJECTION, SUSPENSION INTRA-ARTICULAR; INTRALESIONAL; INTRAMUSCULAR; SOFT TISSUE at 01:01

## 2021-02-05 ENCOUNTER — PATIENT MESSAGE (OUTPATIENT)
Dept: OBSTETRICS AND GYNECOLOGY | Facility: CLINIC | Age: 36
End: 2021-02-05

## 2021-02-05 ENCOUNTER — TELEPHONE (OUTPATIENT)
Dept: OBSTETRICS AND GYNECOLOGY | Facility: CLINIC | Age: 36
End: 2021-02-05

## 2021-02-12 ENCOUNTER — TELEPHONE (OUTPATIENT)
Dept: PAIN MEDICINE | Facility: CLINIC | Age: 36
End: 2021-02-12

## 2021-02-14 ENCOUNTER — PATIENT MESSAGE (OUTPATIENT)
Dept: OBSTETRICS AND GYNECOLOGY | Facility: CLINIC | Age: 36
End: 2021-02-14

## 2021-02-15 ENCOUNTER — TELEPHONE (OUTPATIENT)
Dept: PAIN MEDICINE | Facility: CLINIC | Age: 36
End: 2021-02-15

## 2021-02-19 ENCOUNTER — PROCEDURE VISIT (OUTPATIENT)
Dept: OBSTETRICS AND GYNECOLOGY | Facility: CLINIC | Age: 36
End: 2021-02-19
Payer: MEDICAID

## 2021-02-19 VITALS
WEIGHT: 210.56 LBS | DIASTOLIC BLOOD PRESSURE: 88 MMHG | BODY MASS INDEX: 39.75 KG/M2 | HEIGHT: 61 IN | SYSTOLIC BLOOD PRESSURE: 118 MMHG

## 2021-02-19 DIAGNOSIS — Z12.4 PAP SMEAR FOR CERVICAL CANCER SCREENING: Primary | ICD-10-CM

## 2021-02-19 DIAGNOSIS — Z30.9 ENCOUNTER FOR CONTRACEPTIVE MANAGEMENT, UNSPECIFIED TYPE: ICD-10-CM

## 2021-02-19 PROBLEM — M46.1 SACROILIITIS: Status: RESOLVED | Noted: 2019-05-31 | Resolved: 2021-02-19

## 2021-02-19 PROBLEM — M25.561 ACUTE PAIN OF RIGHT KNEE: Status: RESOLVED | Noted: 2019-12-13 | Resolved: 2021-02-19

## 2021-02-19 LAB
B-HCG UR QL: NEGATIVE
CTP QC/QA: YES

## 2021-02-19 PROCEDURE — 88175 CYTOPATH C/V AUTO FLUID REDO: CPT

## 2021-02-19 PROCEDURE — 58300 INSERTION OF INTRAUTERINE DEVICE: ICD-10-PCS | Mod: S$PBB,,, | Performed by: OBSTETRICS & GYNECOLOGY

## 2021-02-19 PROCEDURE — 58300 INSERT INTRAUTERINE DEVICE: CPT | Mod: PBBFAC | Performed by: OBSTETRICS & GYNECOLOGY

## 2021-02-19 PROCEDURE — 87624 HPV HI-RISK TYP POOLED RSLT: CPT

## 2021-02-19 RX ADMIN — LEVONORGESTREL 14 MCG: 13.5 INTRAUTERINE DEVICE INTRAUTERINE at 02:02

## 2021-02-25 ENCOUNTER — TELEPHONE (OUTPATIENT)
Dept: PAIN MEDICINE | Facility: CLINIC | Age: 36
End: 2021-02-25

## 2021-02-25 ENCOUNTER — PATIENT MESSAGE (OUTPATIENT)
Dept: PAIN MEDICINE | Facility: CLINIC | Age: 36
End: 2021-02-25

## 2021-02-27 LAB
CLINICAL INFO: NORMAL
CYTO CVX: NORMAL
CYTOLOGIST CVX/VAG CYTO: NORMAL
CYTOLOGY CMNT CVX/VAG CYTO-IMP: NORMAL
CYTOLOGY PAP THIN PREP EXPLANATION: NORMAL
DATE OF PREVIOUS PAP: NORMAL
DATE PREVIOUS BX: NO
HPV I/H RISK 4 DNA CVX QL NAA+PROBE: NOT DETECTED
LMP START DATE: NORMAL
MICROORGANISM CVX/VAG CYTO: NORMAL
SPECIMEN SOURCE CVX/VAG CYTO: NORMAL
STAT OF ADQ CVX/VAG CYTO-IMP: NORMAL

## 2021-03-02 RX ORDER — MONTELUKAST SODIUM 10 MG/1
10 TABLET ORAL NIGHTLY
Qty: 30 TABLET | Refills: 3 | OUTPATIENT
Start: 2021-03-02

## 2021-03-04 RX ORDER — MONTELUKAST SODIUM 10 MG/1
10 TABLET ORAL NIGHTLY
Qty: 30 TABLET | Refills: 3 | OUTPATIENT
Start: 2021-03-04

## 2021-03-10 ENCOUNTER — HOSPITAL ENCOUNTER (OUTPATIENT)
Facility: HOSPITAL | Age: 36
Discharge: HOME | End: 2021-03-10
Attending: ANESTHESIOLOGY | Admitting: ANESTHESIOLOGY
Payer: MEDICAID

## 2021-03-10 VITALS
BODY MASS INDEX: 40.25 KG/M2 | DIASTOLIC BLOOD PRESSURE: 88 MMHG | TEMPERATURE: 98 F | OXYGEN SATURATION: 100 % | HEIGHT: 61 IN | HEART RATE: 98 BPM | RESPIRATION RATE: 16 BRPM | SYSTOLIC BLOOD PRESSURE: 137 MMHG | WEIGHT: 213.19 LBS

## 2021-03-10 DIAGNOSIS — M53.3 SACROILIAC JOINT DYSFUNCTION: ICD-10-CM

## 2021-03-10 LAB
B-HCG UR QL: NEGATIVE
CTP QC/QA: YES

## 2021-03-10 PROCEDURE — 81025 URINE PREGNANCY TEST: CPT | Performed by: ANESTHESIOLOGY

## 2021-03-10 PROCEDURE — 27096 PR INJECTION,SACROILIAC JOINT: ICD-10-PCS | Mod: 50,,, | Performed by: ANESTHESIOLOGY

## 2021-03-10 PROCEDURE — 27096 INJECT SACROILIAC JOINT: CPT | Mod: 50 | Performed by: ANESTHESIOLOGY

## 2021-03-10 PROCEDURE — 25000003 PHARM REV CODE 250: Performed by: ANESTHESIOLOGY

## 2021-03-10 PROCEDURE — 63600175 PHARM REV CODE 636 W HCPCS: Performed by: ANESTHESIOLOGY

## 2021-03-10 PROCEDURE — 25500020 PHARM REV CODE 255: Performed by: ANESTHESIOLOGY

## 2021-03-10 PROCEDURE — 27096 INJECT SACROILIAC JOINT: CPT | Mod: 50,,, | Performed by: ANESTHESIOLOGY

## 2021-03-10 RX ORDER — MONTELUKAST SODIUM 10 MG/1
10 TABLET ORAL NIGHTLY
Qty: 30 TABLET | Refills: 3 | Status: SHIPPED | OUTPATIENT
Start: 2021-03-10 | End: 2022-03-15

## 2021-03-10 RX ORDER — LIDOCAINE HYDROCHLORIDE 20 MG/ML
INJECTION, SOLUTION EPIDURAL; INFILTRATION; INTRACAUDAL; PERINEURAL
Status: DISCONTINUED | OUTPATIENT
Start: 2021-03-10 | End: 2021-03-10 | Stop reason: HOSPADM

## 2021-03-10 RX ORDER — METHYLPREDNISOLONE ACETATE 40 MG/ML
INJECTION, SUSPENSION INTRA-ARTICULAR; INTRALESIONAL; INTRAMUSCULAR; SOFT TISSUE
Status: DISCONTINUED | OUTPATIENT
Start: 2021-03-10 | End: 2021-03-10 | Stop reason: HOSPADM

## 2021-04-06 ENCOUNTER — PATIENT OUTREACH (OUTPATIENT)
Dept: ADMINISTRATIVE | Facility: OTHER | Age: 36
End: 2021-04-06

## 2021-04-07 ENCOUNTER — OFFICE VISIT (OUTPATIENT)
Dept: PAIN MEDICINE | Facility: CLINIC | Age: 36
End: 2021-04-07
Payer: MEDICARE

## 2021-04-07 ENCOUNTER — PATIENT MESSAGE (OUTPATIENT)
Dept: PAIN MEDICINE | Facility: CLINIC | Age: 36
End: 2021-04-07

## 2021-04-07 VITALS
BODY MASS INDEX: 40.25 KG/M2 | SYSTOLIC BLOOD PRESSURE: 116 MMHG | DIASTOLIC BLOOD PRESSURE: 86 MMHG | WEIGHT: 213.19 LBS | HEIGHT: 61 IN | HEART RATE: 121 BPM

## 2021-04-07 DIAGNOSIS — M53.3 SACROILIAC JOINT DYSFUNCTION: Primary | ICD-10-CM

## 2021-04-07 DIAGNOSIS — Z98.890 HISTORY OF LUMBAR SURGERY: ICD-10-CM

## 2021-04-07 DIAGNOSIS — M46.1 SACROILIITIS: ICD-10-CM

## 2021-04-07 DIAGNOSIS — M79.18 LUMBAR MUSCLE PAIN: ICD-10-CM

## 2021-04-07 PROCEDURE — 99213 OFFICE O/P EST LOW 20 MIN: CPT | Mod: PBBFAC | Performed by: PHYSICIAN ASSISTANT

## 2021-04-07 PROCEDURE — 99214 OFFICE O/P EST MOD 30 MIN: CPT | Mod: S$PBB,,, | Performed by: PHYSICIAN ASSISTANT

## 2021-04-07 PROCEDURE — 99214 PR OFFICE/OUTPT VISIT, EST, LEVL IV, 30-39 MIN: ICD-10-PCS | Mod: S$PBB,,, | Performed by: PHYSICIAN ASSISTANT

## 2021-04-07 PROCEDURE — 99999 PR PBB SHADOW E&M-EST. PATIENT-LVL III: CPT | Mod: PBBFAC,,, | Performed by: PHYSICIAN ASSISTANT

## 2021-04-07 PROCEDURE — 99999 PR PBB SHADOW E&M-EST. PATIENT-LVL III: ICD-10-PCS | Mod: PBBFAC,,, | Performed by: PHYSICIAN ASSISTANT

## 2021-04-07 RX ORDER — GABAPENTIN 800 MG/1
800 TABLET ORAL 3 TIMES DAILY
Qty: 90 TABLET | Refills: 3 | Status: SHIPPED | OUTPATIENT
Start: 2021-04-07 | End: 2021-05-24

## 2021-05-07 ENCOUNTER — TELEPHONE (OUTPATIENT)
Dept: INTERNAL MEDICINE | Facility: CLINIC | Age: 36
End: 2021-05-07

## 2021-07-14 ENCOUNTER — HOSPITAL ENCOUNTER (EMERGENCY)
Facility: HOSPITAL | Age: 36
Discharge: HOME | End: 2021-07-14
Attending: EMERGENCY MEDICINE
Payer: MEDICAID

## 2021-07-14 VITALS
OXYGEN SATURATION: 99 % | RESPIRATION RATE: 18 BRPM | HEART RATE: 82 BPM | TEMPERATURE: 98 F | SYSTOLIC BLOOD PRESSURE: 129 MMHG | HEIGHT: 61 IN | DIASTOLIC BLOOD PRESSURE: 82 MMHG | BODY MASS INDEX: 39.27 KG/M2 | WEIGHT: 208 LBS

## 2021-07-14 DIAGNOSIS — R06.2 WHEEZING: Primary | ICD-10-CM

## 2021-07-14 DIAGNOSIS — T07.XXXA MULTIPLE CONTUSIONS: ICD-10-CM

## 2021-07-14 DIAGNOSIS — S60.00XA CONTUSION OF FINGER OF LEFT HAND, UNSPECIFIED FINGER, INITIAL ENCOUNTER: ICD-10-CM

## 2021-07-14 DIAGNOSIS — M54.2 NECK PAIN: ICD-10-CM

## 2021-07-14 DIAGNOSIS — M25.571 ACUTE RIGHT ANKLE PAIN: ICD-10-CM
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
## 2021-07-14 DIAGNOSIS — F17.200 SMOKING: ICD-10-CM

## 2021-07-14 PROCEDURE — 90471 IMMUNIZATION ADMIN: CPT | Performed by: PHYSICIAN ASSISTANT

## 2021-07-14 PROCEDURE — 25000242 PHARM REV CODE 250 ALT 637 W/ HCPCS: Performed by: EMERGENCY MEDICINE

## 2021-07-14 PROCEDURE — 63600175 PHARM REV CODE 636 W HCPCS: Performed by: PHYSICIAN ASSISTANT

## 2021-07-14 PROCEDURE — 94640 AIRWAY INHALATION TREATMENT: CPT

## 2021-07-14 PROCEDURE — 12001 RPR S/N/AX/GEN/TRNK 2.5CM/<: CPT

## 2021-07-14 PROCEDURE — 25000003 PHARM REV CODE 250: Performed by: EMERGENCY MEDICINE

## 2021-07-14 PROCEDURE — 99285 EMERGENCY DEPT VISIT HI MDM: CPT | Mod: 25

## 2021-07-14 PROCEDURE — 90715 TDAP VACCINE 7 YRS/> IM: CPT | Performed by: PHYSICIAN ASSISTANT

## 2021-07-14 RX ORDER — CYCLOBENZAPRINE HCL 10 MG
10 TABLET ORAL 3 TIMES DAILY PRN
Qty: 30 TABLET | Refills: 0 | Status: SHIPPED | OUTPATIENT
Start: 2021-07-14 | End: 2022-07-26

## 2021-07-14 RX ORDER — HYDROCODONE BITARTRATE AND ACETAMINOPHEN 5; 325 MG/1; MG/1
1 TABLET ORAL
Status: COMPLETED | OUTPATIENT
Start: 2021-07-14 | End: 2021-07-14

## 2021-07-14 RX ORDER — HYDROCODONE BITARTRATE AND ACETAMINOPHEN 5; 325 MG/1; MG/1
1 TABLET ORAL EVERY 6 HOURS PRN
Qty: 4 TABLET | Refills: 0 | Status: SHIPPED | OUTPATIENT
Start: 2021-07-14 | End: 2022-07-26

## 2021-07-14 RX ORDER — NAPROXEN 500 MG/1
500 TABLET ORAL 2 TIMES DAILY PRN
Qty: 20 TABLET | Refills: 0 | Status: SHIPPED | OUTPATIENT
Start: 2021-07-14 | End: 2021-07-30 | Stop reason: SDUPTHER

## 2021-07-14 RX ORDER — IPRATROPIUM BROMIDE AND ALBUTEROL SULFATE 2.5; .5 MG/3ML; MG/3ML
3 SOLUTION RESPIRATORY (INHALATION) ONCE
Status: COMPLETED | OUTPATIENT
Start: 2021-07-14 | End: 2021-07-14

## 2021-07-14 RX ORDER — LIDOCAINE HYDROCHLORIDE AND EPINEPHRINE 10; 10 MG/ML; UG/ML
10 INJECTION, SOLUTION INFILTRATION; PERINEURAL
Status: COMPLETED | OUTPATIENT
Start: 2021-07-14 | End: 2021-07-14

## 2021-07-14 RX ADMIN — LIDOCAINE HYDROCHLORIDE AND EPINEPHRINE 10 ML: 10; 10 INJECTION, SOLUTION INFILTRATION; PERINEURAL at 10:07

## 2021-07-14 RX ADMIN — IPRATROPIUM BROMIDE AND ALBUTEROL SULFATE 3 ML: .5; 3 SOLUTION RESPIRATORY (INHALATION) at 11:07

## 2021-07-14 RX ADMIN — TETANUS TOXOID, REDUCED DIPHTHERIA TOXOID AND ACELLULAR PERTUSSIS VACCINE, ADSORBED 0.5 ML: 5; 2.5; 8; 8; 2.5 SUSPENSION INTRAMUSCULAR at 10:07

## 2021-07-14 RX ADMIN — NEOMYCIN AND POLYMYXIN B SULFATES AND BACITRACIN ZINC 1 EACH: 400; 3.5; 5 OINTMENT TOPICAL at 11:07

## 2021-07-14 RX ADMIN — HYDROCODONE BITARTRATE AND ACETAMINOPHEN 1 TABLET: 5; 325 TABLET ORAL at 10:07

## 2021-07-26 ENCOUNTER — TELEPHONE (OUTPATIENT)
Dept: INTERNAL MEDICINE | Facility: CLINIC | Age: 36
End: 2021-07-26

## 2021-07-28 ENCOUNTER — TELEPHONE (OUTPATIENT)
Dept: INTERNAL MEDICINE | Facility: CLINIC | Age: 36
End: 2021-07-28

## 2021-07-30 ENCOUNTER — OFFICE VISIT (OUTPATIENT)
Dept: INTERNAL MEDICINE | Facility: CLINIC | Age: 36
End: 2021-07-30
Payer: MEDICAID

## 2021-07-30 VITALS
OXYGEN SATURATION: 97 % | TEMPERATURE: 99 F | SYSTOLIC BLOOD PRESSURE: 110 MMHG | BODY MASS INDEX: 40.96 KG/M2 | WEIGHT: 216.94 LBS | HEART RATE: 97 BPM | HEIGHT: 61 IN | DIASTOLIC BLOOD PRESSURE: 86 MMHG

## 2021-07-30 DIAGNOSIS — R51.9 HEAD ACHE: ICD-10-CM

## 2021-07-30 DIAGNOSIS — S01.01XD LACERATION OF SCALP WITHOUT FOREIGN BODY, SUBSEQUENT ENCOUNTER: ICD-10-CM

## 2021-07-30 DIAGNOSIS — M79.89 RIGHT LEG SWELLING: ICD-10-CM

## 2021-07-30 DIAGNOSIS — R05.9 COUGH: Primary | ICD-10-CM

## 2021-07-30 PROCEDURE — 99214 OFFICE O/P EST MOD 30 MIN: CPT | Mod: PBBFAC,PO | Performed by: FAMILY MEDICINE

## 2021-07-30 PROCEDURE — 99214 OFFICE O/P EST MOD 30 MIN: CPT | Mod: S$PBB,,, | Performed by: FAMILY MEDICINE

## 2021-07-30 PROCEDURE — 99214 PR OFFICE/OUTPT VISIT, EST, LEVL IV, 30-39 MIN: ICD-10-PCS | Mod: S$PBB,,, | Performed by: FAMILY MEDICINE

## 2021-07-30 PROCEDURE — U0003 INFECTIOUS AGENT DETECTION BY NUCLEIC ACID (DNA OR RNA); SEVERE ACUTE RESPIRATORY SYNDROME CORONAVIRUS 2 (SARS-COV-2) (CORONAVIRUS DISEASE [COVID-19]), AMPLIFIED PROBE TECHNIQUE, MAKING USE OF HIGH THROUGHPUT TECHNOLOGIES AS DESCRIBED BY CMS-2020-01-R: HCPCS | Performed by: FAMILY MEDICINE

## 2021-07-30 PROCEDURE — 99999 PR PBB SHADOW E&M-EST. PATIENT-LVL IV: CPT | Mod: PBBFAC,,, | Performed by: FAMILY MEDICINE

## 2021-07-30 PROCEDURE — 99999 PR PBB SHADOW E&M-EST. PATIENT-LVL IV: ICD-10-PCS | Mod: PBBFAC,,, | Performed by: FAMILY MEDICINE

## 2021-07-30 PROCEDURE — U0005 INFEC AGEN DETEC AMPLI PROBE: HCPCS | Performed by: FAMILY MEDICINE

## 2021-07-30 RX ORDER — ALBUTEROL SULFATE 90 UG/1
AEROSOL, METERED RESPIRATORY (INHALATION)
Qty: 18 G | Refills: 0 | Status: SHIPPED | OUTPATIENT
Start: 2021-07-30 | End: 2021-10-11

## 2021-07-30 RX ORDER — FLUTICASONE PROPIONATE 50 MCG
2 SPRAY, SUSPENSION (ML) NASAL DAILY
Qty: 18 G | Refills: 6 | Status: SHIPPED | OUTPATIENT
Start: 2021-07-30

## 2021-07-30 RX ORDER — FLUTICASONE PROPIONATE 50 MCG
2 SPRAY, SUSPENSION (ML) NASAL DAILY
COMMUNITY
Start: 2021-04-16 | End: 2021-07-30 | Stop reason: SDUPTHER

## 2021-07-30 RX ORDER — NAPROXEN 500 MG/1
500 TABLET ORAL 2 TIMES DAILY PRN
Qty: 20 TABLET | Refills: 0 | Status: SHIPPED | OUTPATIENT
Start: 2021-07-30 | End: 2021-10-11

## 2021-07-31 LAB
SARS-COV-2 RNA RESP QL NAA+PROBE: NOT DETECTED
SARS-COV-2- CYCLE NUMBER: -1

## 2021-08-03 ENCOUNTER — TELEPHONE (OUTPATIENT)
Dept: RADIOLOGY | Facility: HOSPITAL | Age: 36
End: 2021-08-03

## 2021-08-04 ENCOUNTER — TELEPHONE (OUTPATIENT)
Dept: INTERNAL MEDICINE | Facility: CLINIC | Age: 36
End: 2021-08-04

## 2021-08-19 ENCOUNTER — TELEPHONE (OUTPATIENT)
Dept: PAIN MEDICINE | Facility: CLINIC | Age: 36
End: 2021-08-19

## 2021-08-19 DIAGNOSIS — M53.3 SACROILIAC JOINT DYSFUNCTION: Primary | ICD-10-CM

## 2021-08-19 RX ORDER — GABAPENTIN 800 MG/1
800 TABLET ORAL 3 TIMES DAILY
Qty: 90 TABLET | Refills: 1 | Status: SHIPPED | OUTPATIENT
Start: 2021-08-19 | End: 2021-10-21 | Stop reason: SDUPTHER

## 2021-08-25 ENCOUNTER — TELEPHONE (OUTPATIENT)
Dept: PAIN MEDICINE | Facility: CLINIC | Age: 36
End: 2021-08-25

## 2021-10-14 ENCOUNTER — TELEPHONE (OUTPATIENT)
Dept: PAIN MEDICINE | Facility: CLINIC | Age: 36
End: 2021-10-14

## 2021-10-14 ENCOUNTER — PATIENT OUTREACH (OUTPATIENT)
Dept: ADMINISTRATIVE | Facility: OTHER | Age: 36
End: 2021-10-14

## 2021-10-20 ENCOUNTER — TELEPHONE (OUTPATIENT)
Dept: PAIN MEDICINE | Facility: CLINIC | Age: 36
End: 2021-10-20

## 2021-10-20 DIAGNOSIS — M53.3 SACROILIAC JOINT DYSFUNCTION: ICD-10-CM

## 2021-10-21 ENCOUNTER — OFFICE VISIT (OUTPATIENT)
Dept: PAIN MEDICINE | Facility: CLINIC | Age: 36
End: 2021-10-21
Payer: MEDICAID

## 2021-10-21 VITALS — HEIGHT: 61 IN | WEIGHT: 216 LBS | RESPIRATION RATE: 17 BRPM | BODY MASS INDEX: 40.78 KG/M2

## 2021-10-21 DIAGNOSIS — Z98.890 HISTORY OF LUMBAR SURGERY: ICD-10-CM

## 2021-10-21 DIAGNOSIS — M53.3 SACROILIAC JOINT DYSFUNCTION: ICD-10-CM

## 2021-10-21 DIAGNOSIS — M79.18 LUMBAR MUSCLE PAIN: ICD-10-CM
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
## 2021-10-21 DIAGNOSIS — M79.2 NEUROPATHIC PAIN: ICD-10-CM

## 2021-10-21 PROCEDURE — 99213 PR OFFICE/OUTPT VISIT, EST, LEVL III, 20-29 MIN: ICD-10-PCS | Mod: 95,,, | Performed by: PHYSICIAN ASSISTANT

## 2021-10-21 PROCEDURE — 99213 OFFICE O/P EST LOW 20 MIN: CPT | Mod: 95,,, | Performed by: PHYSICIAN ASSISTANT

## 2021-10-21 RX ORDER — GABAPENTIN 800 MG/1
800 TABLET ORAL 3 TIMES DAILY
Qty: 90 TABLET | Refills: 2 | Status: SHIPPED | OUTPATIENT
Start: 2021-10-21 | End: 2022-03-24

## 2021-12-14 ENCOUNTER — TELEPHONE (OUTPATIENT)
Dept: PAIN MEDICINE | Facility: CLINIC | Age: 36
End: 2021-12-14
Payer: MEDICAID

## 2021-12-22 ENCOUNTER — TELEPHONE (OUTPATIENT)
Dept: PAIN MEDICINE | Facility: CLINIC | Age: 36
End: 2021-12-22
Payer: MEDICAID

## 2021-12-28 ENCOUNTER — TELEPHONE (OUTPATIENT)
Dept: PAIN MEDICINE | Facility: CLINIC | Age: 36
End: 2021-12-28
Payer: MEDICAID

## 2022-01-10 ENCOUNTER — PATIENT MESSAGE (OUTPATIENT)
Dept: RHEUMATOLOGY | Facility: CLINIC | Age: 37
End: 2022-01-10
Payer: MEDICAID

## 2022-01-10 ENCOUNTER — TELEPHONE (OUTPATIENT)
Dept: RHEUMATOLOGY | Facility: CLINIC | Age: 37
End: 2022-01-10
Payer: MEDICAID

## 2022-01-10 NOTE — TELEPHONE ENCOUNTER
"Attempted to call patient regarding rescheduling appointment that is scheduled with Mrs. Veronica Courtney PA-C on Thursday 01/13/2022 due to provider schedule change. Left v/m for patient to call back at earliest convenience.    Makayla Nunez MA ([place])  Interventional Pain Management Department  "

## 2022-01-12 ENCOUNTER — TELEPHONE (OUTPATIENT)
Dept: PAIN MEDICINE | Facility: CLINIC | Age: 37
End: 2022-01-12
Payer: MEDICAID

## 2022-01-12 NOTE — TELEPHONE ENCOUNTER
Called patient >3 times attempting to R/S her appointment. No answer, no voicemail set. Patient never called back. Cancel her appointment on 1/12/2022 at 4:33.

## 2022-01-13 ENCOUNTER — TELEPHONE (OUTPATIENT)
Dept: PAIN MEDICINE | Facility: CLINIC | Age: 37
End: 2022-01-13
Payer: MEDICAID

## 2022-01-13 NOTE — TELEPHONE ENCOUNTER
----- Message from Elisabeth Lawrence MA sent at 1/13/2022 10:33 AM CST -----  Contact: Marissa    ----- Message -----  From: Veronica Courtney PA-C  Sent: 1/13/2022  10:29 AM CST  To: Elisabeth Lawrence MA    Next week or week after -- how about jan 26?  ----- Message -----  From: Adam Limon  Sent: 1/13/2022  10:16 AM CST  To: [facility] Staff    Pt is requesting a call to reschedule her appointment. Please call her back at [phone].            Thanks  DD

## 2022-01-13 NOTE — TELEPHONE ENCOUNTER
Attempt to reach patent to schedule appointment from [facility]. Patient did not answer. Left message on patients voice mail to call back at earliest convenience to schedule apt.   Margarito Barnes  Medical Assistant

## 2022-01-24 ENCOUNTER — OFFICE VISIT (OUTPATIENT)
Dept: INTERNAL MEDICINE | Facility: CLINIC | Age: 37
End: 2022-01-24
Payer: MEDICAID

## 2022-01-24 VITALS
WEIGHT: 201.75 LBS | BODY MASS INDEX: 37.13 KG/M2 | DIASTOLIC BLOOD PRESSURE: 80 MMHG | HEIGHT: 62 IN | SYSTOLIC BLOOD PRESSURE: 110 MMHG | OXYGEN SATURATION: 100 % | HEART RATE: 128 BPM

## 2022-01-24 DIAGNOSIS — H65.91 OTHER NONSUPPURATIVE OTITIS MEDIA OF RIGHT EAR, UNSPECIFIED CHRONICITY: Primary | ICD-10-CM

## 2022-01-24 PROCEDURE — 3008F PR BODY MASS INDEX (BMI) DOCUMENTED: ICD-10-PCS | Mod: CPTII,,, | Performed by: INTERNAL MEDICINE

## 2022-01-24 PROCEDURE — 1159F MED LIST DOCD IN RCRD: CPT | Mod: CPTII,,, | Performed by: INTERNAL MEDICINE

## 2022-01-24 PROCEDURE — 99213 OFFICE O/P EST LOW 20 MIN: CPT | Mod: S$PBB,,, | Performed by: INTERNAL MEDICINE

## 2022-01-24 PROCEDURE — 99999 PR PBB SHADOW E&M-EST. PATIENT-LVL III: ICD-10-PCS | Mod: PBBFAC,,, | Performed by: INTERNAL MEDICINE

## 2022-01-24 PROCEDURE — 3074F SYST BP LT 130 MM HG: CPT | Mod: CPTII,,, | Performed by: INTERNAL MEDICINE

## 2022-01-24 PROCEDURE — 1159F PR MEDICATION LIST DOCUMENTED IN MEDICAL RECORD: ICD-10-PCS | Mod: CPTII,,, | Performed by: INTERNAL MEDICINE

## 2022-01-24 PROCEDURE — 99999 PR PBB SHADOW E&M-EST. PATIENT-LVL III: CPT | Mod: PBBFAC,,, | Performed by: INTERNAL MEDICINE

## 2022-01-24 PROCEDURE — 3079F PR MOST RECENT DIASTOLIC BLOOD PRESSURE 80-89 MM HG: ICD-10-PCS | Mod: CPTII,,, | Performed by: INTERNAL MEDICINE

## 2022-01-24 PROCEDURE — 99213 PR OFFICE/OUTPT VISIT, EST, LEVL III, 20-29 MIN: ICD-10-PCS | Mod: S$PBB,,, | Performed by: INTERNAL MEDICINE

## 2022-01-24 PROCEDURE — 3074F PR MOST RECENT SYSTOLIC BLOOD PRESSURE < 130 MM HG: ICD-10-PCS | Mod: CPTII,,, | Performed by: INTERNAL MEDICINE

## 2022-01-24 PROCEDURE — 3008F BODY MASS INDEX DOCD: CPT | Mod: CPTII,,, | Performed by: INTERNAL MEDICINE

## 2022-01-24 PROCEDURE — 99213 OFFICE O/P EST LOW 20 MIN: CPT | Mod: PBBFAC,PO | Performed by: INTERNAL MEDICINE

## 2022-01-24 PROCEDURE — 3079F DIAST BP 80-89 MM HG: CPT | Mod: CPTII,,, | Performed by: INTERNAL MEDICINE

## 2022-01-24 RX ORDER — AMOXICILLIN AND CLAVULANATE POTASSIUM 875; 125 MG/1; MG/1
1 TABLET, FILM COATED ORAL EVERY 12 HOURS
Qty: 20 TABLET | Refills: 0 | Status: SHIPPED | OUTPATIENT
Start: 2022-01-24 | End: 2024-02-21

## 2022-01-24 NOTE — PROGRESS NOTES
"HPI:  Patient is a 36-year-old female comes today with complaints of right ear pain for the last 1-2 weeks.  She denies any fever.  She denies any chest congestion.  She denies any sore throat    Current meds have been verified and updated per the EMR  Exam:/80 (BP Location: Right arm)   Pulse (!) 128   Ht 5' 2" (1.575 m)   Wt 91.5 kg (201 lb 11.5 oz)   SpO2 100%   BMI 36.90 kg/m²   Right TM is erythematous.  The left TM is unremarkable.  Throat shows no erythema exudate  Chest clear  Lab Results   Component Value Date    WBC 10.31 10/22/2020    HGB 14.6 10/22/2020    HCT 47.0 10/22/2020     10/22/2020    CHOL 196 10/22/2020    TRIG 143 10/22/2020    HDL 46 10/22/2020    ALT 59 (H) 10/22/2020    AST 38 10/22/2020     10/22/2020    K 4.2 10/22/2020     10/22/2020    CREATININE 0.9 10/22/2020    BUN 12 10/22/2020    CO2 26 10/22/2020    TSH 1.773 10/22/2020    HGBA1C 5.3 10/22/2020       Impression:  Right otitis media  Patient Active Problem List   Diagnosis    Closed TBI (traumatic brain injury)    Lumbar spondylosis    Sacroiliac joint dysfunction       Plan:  Orders Placed This Encounter    amoxicillin-clavulanate 875-125mg (AUGMENTIN) 875-125 mg per tablet     She was placed on Augmentin twice a day for 10 days.  If not improved at that time she needs to be seen again  This note is generated with speech recognition software and is subject to transcription error and sound alike phrases that may be missed by proofreading.      "

## 2022-02-07 ENCOUNTER — TELEPHONE (OUTPATIENT)
Dept: INTERNAL MEDICINE | Facility: CLINIC | Age: 37
End: 2022-02-07
Payer: MEDICAID

## 2022-02-07 NOTE — TELEPHONE ENCOUNTER
----- Message from Federica Sladaña sent at 2/7/2022 12:58 PM CST -----  Contact: self [phone]  Type:  Sooner Apoointment Request    Caller is requesting a sooner appointment.  Caller declined first available appointment listed below.  Caller will not accept being placed on the waitlist and is requesting a message be sent to doctor.  Name of Caller: Marissa Moctezuma    When is the first available appointment? 04/16  Symptoms: Ear Pain (Right)  Would the patient rather a call back or a response via [facility]?  Call back   Best Call Back Number:[phone]  Additional Information:

## 2022-03-17 ENCOUNTER — TELEPHONE (OUTPATIENT)
Dept: PAIN MEDICINE | Facility: CLINIC | Age: 37
End: 2022-03-17
Payer: MEDICAID

## 2022-03-17 NOTE — TELEPHONE ENCOUNTER
----- Message from Sylvia Mathew sent at 3/17/2022  2:32 PM CDT -----  Contact: self  Marissa Moctezuma would like a call back at [phone], in regards to getting an appointment scheduled at the [facility] location for a check up and refill meds.

## 2022-03-23 ENCOUNTER — TELEPHONE (OUTPATIENT)
Dept: PHYSICAL MEDICINE AND REHAB | Facility: CLINIC | Age: 37
End: 2022-03-23
Payer: MEDICAID

## 2022-03-23 NOTE — TELEPHONE ENCOUNTER
----- Message from Karyn Mahad sent at 3/23/2022  4:37 PM CDT -----  Pt is calling in regards to getting an audio apt scheduled as soon as possible. Pt stated that she would like a call back today. Please call  [phone]    .Type:  RX Refill Request    Who Called: HPI DOYLE [[id number]]  Refill or New Rx:refill  RX Name and Strength:gabapentin (NEURONTIN) 800 MG tablet  How is the patient currently taking it? (ex. 1XDay):  Is this a 30 day or 90 day RX:  Preferred Pharmacy with phone number:  Local or Mail Order:local  Ordering Provider: Roz  Would the patient rather a call back or a response via [facility]? call  Best Call Back Number: [phone]  Additional Information:       [id number] [place] [facility] [address] AT [facility] [address]  [place]  [address] [place] [address]  Phone: [phone] Fax: [phone]

## 2022-03-24 ENCOUNTER — TELEPHONE (OUTPATIENT)
Dept: PAIN MEDICINE | Facility: CLINIC | Age: 37
End: 2022-03-24
Payer: MEDICAID

## 2022-04-28 ENCOUNTER — TELEPHONE (OUTPATIENT)
Dept: PAIN MEDICINE | Facility: CLINIC | Age: 37
End: 2022-04-28
Payer: MEDICAID

## 2022-04-28 NOTE — TELEPHONE ENCOUNTER
----- Message from Renerifarhana Martell sent at 4/28/2022 11:31 AM CDT -----  Contact: Marissa  .Type:  RX Refill Request    Who Called:  Marissa  Refill or New Rx: refill  RX Name and Strength: Veronica Courtney PA-C  How is the patient currently taking it? (ex. 1XDay):  Is this a 30 day or 90 day RX:  Preferred Pharmacy with phone number: .  [facility] #[id number] - [place], [place] - [address] AT [facility]  [address] [facility] [address]  Phone: [phone] Fax: [phone]  Local or Mail Order: local  Ordering Provider: Veronica Courtney  Would the patient rather a call back or a response via [facility]? call  Best Call Back Number: .[phone]   Additional Information: patient reports being unable to come into the office until after 5/3/22. Also requests a call back today to notify.   Thanks,  RP

## 2022-04-29 ENCOUNTER — TELEPHONE (OUTPATIENT)
Dept: PAIN MEDICINE | Facility: CLINIC | Age: 37
End: 2022-04-29
Payer: MEDICAID

## 2022-04-29 ENCOUNTER — PATIENT OUTREACH (OUTPATIENT)
Dept: ADMINISTRATIVE | Facility: OTHER | Age: 37
End: 2022-04-29
Payer: MEDICAID

## 2022-05-02 ENCOUNTER — TELEPHONE (OUTPATIENT)
Dept: PAIN MEDICINE | Facility: CLINIC | Age: 37
End: 2022-05-02
Payer: MEDICAID

## 2022-05-02 ENCOUNTER — OFFICE VISIT (OUTPATIENT)
Dept: PAIN MEDICINE | Facility: CLINIC | Age: 37
End: 2022-05-02
Payer: MEDICARE

## 2022-05-02 VITALS — HEIGHT: 62 IN | BODY MASS INDEX: 36.9 KG/M2

## 2022-05-02 DIAGNOSIS — M46.1 SACROILIITIS: ICD-10-CM

## 2022-05-02 DIAGNOSIS — M79.18 LUMBAR MUSCLE PAIN: Primary | ICD-10-CM

## 2022-05-02 DIAGNOSIS — Z98.890 HISTORY OF LUMBAR SURGERY: ICD-10-CM

## 2022-05-02 DIAGNOSIS — M79.2 NEUROPATHIC PAIN: ICD-10-CM

## 2022-05-02 PROCEDURE — 99212 OFFICE O/P EST SF 10 MIN: CPT | Mod: 95,,, | Performed by: PHYSICIAN ASSISTANT

## 2022-05-02 PROCEDURE — 99212 PR OFFICE/OUTPT VISIT, EST, LEVL II, 10-19 MIN: ICD-10-PCS | Mod: 95,,, | Performed by: PHYSICIAN ASSISTANT

## 2022-05-02 RX ORDER — GABAPENTIN 800 MG/1
800 TABLET ORAL 3 TIMES DAILY
Qty: 90 TABLET | Refills: 2 | Status: SHIPPED | OUTPATIENT
Start: 2022-05-02 | End: 2022-07-26 | Stop reason: SDUPTHER

## 2022-05-02 NOTE — TELEPHONE ENCOUNTER
----- Message from Sheron Healy sent at 5/2/2022  8:42 AM CDT -----  Regarding: pt confirming appt  Contact: pt  Pt is confirming appt today for 1:20

## 2022-05-02 NOTE — TELEPHONE ENCOUNTER
Patient notified that she has a virtual appointment today with Veronica martinez only.  She will be called between 12-2.

## 2022-07-01 ENCOUNTER — TELEPHONE (OUTPATIENT)
Dept: PAIN MEDICINE | Facility: CLINIC | Age: 37
End: 2022-07-01
Payer: MEDICAID

## 2022-07-01 NOTE — TELEPHONE ENCOUNTER
----- Message from Ananda Olvera sent at 7/1/2022  1:25 PM CDT -----  Contact: Marissabernabe Kan would like to change her appt to a virtual appt for 7/26.She has no transportation at the moment.Please give her a call back at [phone]

## 2022-07-06 ENCOUNTER — TELEPHONE (OUTPATIENT)
Dept: PAIN MEDICINE | Facility: CLINIC | Age: 37
End: 2022-07-06
Payer: MEDICAID

## 2022-07-06 NOTE — TELEPHONE ENCOUNTER
----- Message from Milton Martell sent at 7/6/2022  7:56 AM CDT -----  Contact: Marissa  Patient is calling to speak with the nurse regarding changing the virtual 7/26/22 to an audio visit only. Patient reports being unable to complete a virtual visit and is requesting a phone call that same time instead. Explains also having transportation concerns and has so much trouble working with [employer] alone. Please give patient a call back at [phone] today as requested.  Thanks,  RP

## 2022-07-26 ENCOUNTER — OFFICE VISIT (OUTPATIENT)
Dept: PAIN MEDICINE | Facility: CLINIC | Age: 37
End: 2022-07-26
Payer: MEDICAID

## 2022-07-26 VITALS — HEIGHT: 62 IN | BODY MASS INDEX: 36.9 KG/M2

## 2022-07-26 DIAGNOSIS — Z98.890 HISTORY OF LUMBAR SURGERY: ICD-10-CM

## 2022-07-26 DIAGNOSIS — M46.1 SACROILIITIS: ICD-10-CM

## 2022-07-26 DIAGNOSIS — M79.2 NEUROPATHIC PAIN: ICD-10-CM

## 2022-07-26 DIAGNOSIS — M53.3 SACROILIAC JOINT DYSFUNCTION: ICD-10-CM

## 2022-07-26 DIAGNOSIS — M79.18 LUMBAR MUSCLE PAIN: Primary | ICD-10-CM

## 2022-07-26 PROCEDURE — 99214 PR OFFICE/OUTPT VISIT, EST, LEVL IV, 30-39 MIN: ICD-10-PCS | Mod: 95,,, | Performed by: PHYSICIAN ASSISTANT

## 2022-07-26 PROCEDURE — 99214 OFFICE O/P EST MOD 30 MIN: CPT | Mod: 95,,, | Performed by: PHYSICIAN ASSISTANT

## 2022-07-26 RX ORDER — GABAPENTIN 800 MG/1
800 TABLET ORAL 3 TIMES DAILY
Qty: 90 TABLET | Refills: 2 | Status: SHIPPED | OUTPATIENT
Start: 2022-07-26 | End: 2022-11-02 | Stop reason: SDUPTHER
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
## 2022-10-17 ENCOUNTER — TELEPHONE (OUTPATIENT)
Dept: PAIN MEDICINE | Facility: CLINIC | Age: 37
End: 2022-10-17
Payer: MEDICAID

## 2022-10-24 ENCOUNTER — TELEPHONE (OUTPATIENT)
Dept: PAIN MEDICINE | Facility: CLINIC | Age: 37
End: 2022-10-24
Payer: MEDICAID

## 2022-10-24 NOTE — TELEPHONE ENCOUNTER
----- Message from Sylvia Mathew sent at 10/24/2022  3:06 PM CDT -----  Contact: Patient  Marissa Moctezuma would like a call back at [phone], in regards to scheduling an appointment. Pt states that the link she was sent on [facility] for the virtual visit did not work.
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
## 2022-11-02 ENCOUNTER — TELEPHONE (OUTPATIENT)
Dept: PAIN MEDICINE | Facility: CLINIC | Age: 37
End: 2022-11-02
Payer: MEDICAID

## 2022-11-02 DIAGNOSIS — M79.2 NEUROPATHIC PAIN: ICD-10-CM

## 2022-11-02 RX ORDER — GABAPENTIN 800 MG/1
800 TABLET ORAL 3 TIMES DAILY
Qty: 90 TABLET | Refills: 0 | Status: SHIPPED | OUTPATIENT
Start: 2022-11-02 | End: 2022-11-28 | Stop reason: SDUPTHER

## 2022-11-02 NOTE — TELEPHONE ENCOUNTER
----- Message from Enmanuel Harley MA sent at 11/1/2022  1:30 PM CDT -----  Contact: Ronald  This pt is asking if we can send a  30 day Rx of gababpentin to her pharmacy. She was last seen in 07/22.   ----- Message -----  From: Yadira Qiu  Sent: 11/1/2022   1:01 PM CDT  To: [facility] Staff    The patient is requesting a callback from the nurse in regards to patient care.      Please call ronald at [phone]    Thanks

## 2022-11-11 ENCOUNTER — TELEPHONE (OUTPATIENT)
Dept: PAIN MEDICINE | Facility: CLINIC | Age: 37
End: 2022-11-11
Payer: MEDICAID

## 2022-11-28 ENCOUNTER — OFFICE VISIT (OUTPATIENT)
Dept: PAIN MEDICINE | Facility: CLINIC | Age: 37
End: 2022-11-28
Payer: MEDICAID

## 2022-11-28 VITALS — BODY MASS INDEX: 36.9 KG/M2 | HEIGHT: 62 IN

## 2022-11-28 DIAGNOSIS — M79.18 LUMBAR MUSCLE PAIN: ICD-10-CM

## 2022-11-28 DIAGNOSIS — M46.1 SACROILIITIS: ICD-10-CM

## 2022-11-28 DIAGNOSIS — Z98.890 HISTORY OF LUMBAR SURGERY: ICD-10-CM

## 2022-11-28 DIAGNOSIS — M79.2 NEUROPATHIC PAIN: Primary | ICD-10-CM

## 2022-11-28 PROCEDURE — 3008F BODY MASS INDEX DOCD: CPT | Mod: CPTII,95,, | Performed by: PHYSICIAN ASSISTANT

## 2022-11-28 PROCEDURE — 1159F PR MEDICATION LIST DOCUMENTED IN MEDICAL RECORD: ICD-10-PCS | Mod: CPTII,95,, | Performed by: PHYSICIAN ASSISTANT

## 2022-11-28 PROCEDURE — 3008F PR BODY MASS INDEX (BMI) DOCUMENTED: ICD-10-PCS | Mod: CPTII,95,, | Performed by: PHYSICIAN ASSISTANT

## 2022-11-28 PROCEDURE — 1159F MED LIST DOCD IN RCRD: CPT | Mod: CPTII,95,, | Performed by: PHYSICIAN ASSISTANT

## 2022-11-28 PROCEDURE — 99442 PR PHYSICIAN TELEPHONE EVALUATION 11-20 MIN: CPT | Mod: 95,,, | Performed by: PHYSICIAN ASSISTANT

## 2022-11-28 PROCEDURE — 1160F PR REVIEW ALL MEDS BY PRESCRIBER/CLIN PHARMACIST DOCUMENTED: ICD-10-PCS | Mod: CPTII,95,, | Performed by: PHYSICIAN ASSISTANT

## 2022-11-28 PROCEDURE — 99442 PR PHYSICIAN TELEPHONE EVALUATION 11-20 MIN: ICD-10-PCS | Mod: 95,,, | Performed by: PHYSICIAN ASSISTANT

## 2022-11-28 PROCEDURE — 1160F RVW MEDS BY RX/DR IN RCRD: CPT | Mod: CPTII,95,, | Performed by: PHYSICIAN ASSISTANT

## 2022-11-28 RX ORDER — GABAPENTIN 800 MG/1
800 TABLET ORAL 3 TIMES DAILY
Qty: 90 TABLET | Refills: 2 | Status: SHIPPED | OUTPATIENT
Start: 2022-11-28 | End: 2023-03-23

## 2022-12-27 ENCOUNTER — TELEPHONE (OUTPATIENT)
Dept: PAIN MEDICINE | Facility: CLINIC | Age: 37
End: 2022-12-27
Payer: MEDICAID

## 2022-12-27 NOTE — TELEPHONE ENCOUNTER
----- Message from Alexi Berman sent at 12/27/2022 12:33 PM CST -----  Contact: self  Pt is calling in regards to consulting with a nurse pls call back t [phone]   Thank you    Titus
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
## 2023-02-27 ENCOUNTER — TELEPHONE (OUTPATIENT)
Dept: PAIN MEDICINE | Facility: CLINIC | Age: 38
End: 2023-02-27
Payer: MEDICAID

## 2023-04-06 NOTE — TELEPHONE ENCOUNTER
----- Message from Jessenia Blanco sent at 10/7/2019 10:34 AM CDT -----  Contact: self  needs call back regarding 9/4 discussion, scheduling appointment..[phone] (home)     
1: Patient to be independent and compliant with HEP. Long Term Goal 2: Patient to be (-) for positional vertigo testing with no nystagmus or increased dizziness noted to return to PLOF. Long Term Goal 3: Patient to have improved Tinetti balance score >/=20/28 for decreased fall risk. Long Term Goal 4: Patient to be able to complete smooth pursuits, saccades and gaze stabilization exercises with no increase in dizziness or nausea. Long Term Goal 5: Patient to report >/=50% improvement in symptoms for improved QOL.     Minutes Tracking:  Time In: 1105  Time Out: [address] [facility]  Minutes: [address], PTA     Date: 4/6/2023

## 2023-04-12 NOTE — TELEPHONE ENCOUNTER
I got with Veronica Courtney and asked if black bleeding from taking etodolac is normal and she said it is not and informed me to tell pt to go see pcp and I informed pt. Pt understood. All questions answered.   
normal

## 2023-05-22 ENCOUNTER — TELEPHONE (OUTPATIENT)
Dept: PAIN MEDICINE | Facility: CLINIC | Age: 38
End: 2023-05-22
Payer: MEDICAID

## 2023-05-22 NOTE — TELEPHONE ENCOUNTER
LVM to return call to schedule appointment for medication refills.     Last filled 4/26/2023 Gabapentin 800mg TID  LOV 11/28/22

## 2023-05-22 NOTE — TELEPHONE ENCOUNTER
Pt states she will set up [employer] to complete virtual appointment with Veronica Courtney PA-C

## 2023-05-22 NOTE — TELEPHONE ENCOUNTER
----- Message from Allie Cheung sent at 5/22/2023 11:21 AM CDT -----  Type:  Patient Returning Call    Who Called:Marissa  Who Left Message for Patient:Cathleen Cheney  Does the patient know what this is regarding?:appointment  Would the patient rather a call back or a response via [facility]? call  Best Call Back Number: [phone]  Additional Information: Patient reports missing a call and request a call back.

## 2023-05-22 NOTE — TELEPHONE ENCOUNTER
----- Message from Katherine Redmnod sent at 5/22/2023 10:03 AM CDT -----  Contact: Patient, [phone]  Requesting an RX refill or new RX.  Is this a refill or new RX: Refill  RX name and strength (copy/paste from chart):  gabapentin (NEURONTIN) 800 MG tablet  Is this a 30 day or 90 day RX: 30  Pharmacy name and phone # (copy/paste from chart):    [id number] - [facility] [place] - [address] [facility] [facility]  [address]  [facility] [address]  Phone: [phone] Fax: [phone]  The doctors have asked that we provide their patients with the following 2 reminders -- prescription refills can take up to 72 hours, and a friendly reminder that in the future you can use your [facility] account to request refills: Yes

## 2023-05-26 ENCOUNTER — TELEPHONE (OUTPATIENT)
Dept: PAIN MEDICINE | Facility: CLINIC | Age: 38
End: 2023-05-26
Payer: MEDICAID

## 2023-05-26 NOTE — TELEPHONE ENCOUNTER
----- Message from Venus Schafer sent at 5/26/2023  8:36 AM CDT -----  Contact: PHI  .Type:  Sooner Apoointment Request    Caller is requesting a sooner appointment.  Caller declined first available appointment listed below.  Caller will not accept being placed on the waitlist and is requesting a message be sent to doctor.  Name of Caller: PHI   When is the first available appointment? 9/2023  Symptoms: Medication refill, check up   Would the patient rather a call back or a response via [facility]? Call   Best Call Back Number: .[phone]

## 2023-06-06 ENCOUNTER — TELEPHONE (OUTPATIENT)
Dept: PAIN MEDICINE | Facility: CLINIC | Age: 38
End: 2023-06-06
Payer: MEDICAID

## 2023-06-06 NOTE — TELEPHONE ENCOUNTER
----- Message from Veronica Courtney PA-C sent at 6/6/2023  9:37 AM CDT -----  Contact: self [phone]  Please call patient and see what medication. There are no available slots today.   ----- Message -----  From: Mark Mendoza MA  Sent: 6/6/2023   8:55 AM CDT  To: Veronica Courtney PA-C    Please see below and advise.   ----- Message -----  From: Chelsey Chamorro  Sent: 6/6/2023   8:11 AM CDT  To: [facility] Staff    Patient called in this morning stating she is out of medication and her appointment is not until the 13th of June. She would like to know if she can be fit in today she is in severe pain. Please call back [phone]. Thanks tpelise

## 2023-06-06 NOTE — TELEPHONE ENCOUNTER
Phoned patient and she stated that she was able to get a 9am appt  [facility] Kaylyn- graeme

## 2023-06-06 NOTE — TELEPHONE ENCOUNTER
Returned pt call. Pt states that she is in extreme pain and needs her medication refill. States that the next available appointment was 06/13. Pt states that she can not wait that long. States that she has been out of her medication for a week. Pt states that she was taking gabapentin 800 mg TID. Appointment rescheduled to 06/07

## 2023-06-07 ENCOUNTER — OFFICE VISIT (OUTPATIENT)
Dept: PAIN MEDICINE | Facility: CLINIC | Age: 38
End: 2023-06-07
Payer: MEDICAID

## 2023-06-07 VITALS
SYSTOLIC BLOOD PRESSURE: 129 MMHG | WEIGHT: 188.5 LBS | HEART RATE: 98 BPM | HEIGHT: 62 IN | BODY MASS INDEX: 34.69 KG/M2 | DIASTOLIC BLOOD PRESSURE: 87 MMHG

## 2023-06-07 DIAGNOSIS — M54.50 ACUTE EXACERBATION OF CHRONIC LOW BACK PAIN: ICD-10-CM

## 2023-06-07 DIAGNOSIS — M79.18 LUMBAR MUSCLE PAIN: ICD-10-CM

## 2023-06-07 DIAGNOSIS — Z98.890 HISTORY OF LUMBAR SURGERY: ICD-10-CM

## 2023-06-07 DIAGNOSIS — G89.29 ACUTE EXACERBATION OF CHRONIC LOW BACK PAIN: ICD-10-CM

## 2023-06-07 DIAGNOSIS — M79.2 NEUROPATHIC PAIN: Primary | ICD-10-CM

## 2023-06-07 DIAGNOSIS — M46.1 SACROILIITIS: ICD-10-CM

## 2023-06-07 PROCEDURE — 3008F PR BODY MASS INDEX (BMI) DOCUMENTED: ICD-10-PCS | Mod: CPTII,,, | Performed by: PHYSICIAN ASSISTANT

## 2023-06-07 PROCEDURE — 3074F SYST BP LT 130 MM HG: CPT | Mod: CPTII,,, | Performed by: PHYSICIAN ASSISTANT

## 2023-06-07 PROCEDURE — 1160F PR REVIEW ALL MEDS BY PRESCRIBER/CLIN PHARMACIST DOCUMENTED: ICD-10-PCS | Mod: CPTII,,, | Performed by: PHYSICIAN ASSISTANT

## 2023-06-07 PROCEDURE — 99999 PR PBB SHADOW E&M-EST. PATIENT-LVL III: ICD-10-PCS | Mod: PBBFAC,,, | Performed by: PHYSICIAN ASSISTANT

## 2023-06-07 PROCEDURE — 1159F PR MEDICATION LIST DOCUMENTED IN MEDICAL RECORD: ICD-10-PCS | Mod: CPTII,,, | Performed by: PHYSICIAN ASSISTANT

## 2023-06-07 PROCEDURE — 3079F DIAST BP 80-89 MM HG: CPT | Mod: CPTII,,, | Performed by: PHYSICIAN ASSISTANT

## 2023-06-07 PROCEDURE — 99999 PR PBB SHADOW E&M-EST. PATIENT-LVL III: CPT | Mod: PBBFAC,,, | Performed by: PHYSICIAN ASSISTANT

## 2023-06-07 PROCEDURE — 99214 OFFICE O/P EST MOD 30 MIN: CPT | Mod: S$PBB,,, | Performed by: PHYSICIAN ASSISTANT

## 2023-06-07 PROCEDURE — 3074F PR MOST RECENT SYSTOLIC BLOOD PRESSURE < 130 MM HG: ICD-10-PCS | Mod: CPTII,,, | Performed by: PHYSICIAN ASSISTANT

## 2023-06-07 PROCEDURE — 3008F BODY MASS INDEX DOCD: CPT | Mod: CPTII,,, | Performed by: PHYSICIAN ASSISTANT

## 2023-06-07 PROCEDURE — 99214 PR OFFICE/OUTPT VISIT, EST, LEVL IV, 30-39 MIN: ICD-10-PCS | Mod: S$PBB,,, | Performed by: PHYSICIAN ASSISTANT

## 2023-06-07 PROCEDURE — 3079F PR MOST RECENT DIASTOLIC BLOOD PRESSURE 80-89 MM HG: ICD-10-PCS | Mod: CPTII,,, | Performed by: PHYSICIAN ASSISTANT

## 2023-06-07 PROCEDURE — 1159F MED LIST DOCD IN RCRD: CPT | Mod: CPTII,,, | Performed by: PHYSICIAN ASSISTANT

## 2023-06-07 PROCEDURE — 99213 OFFICE O/P EST LOW 20 MIN: CPT | Mod: PBBFAC | Performed by: PHYSICIAN ASSISTANT

## 2023-06-07 PROCEDURE — 1160F RVW MEDS BY RX/DR IN RCRD: CPT | Mod: CPTII,,, | Performed by: PHYSICIAN ASSISTANT

## 2023-06-07 RX ORDER — KETOROLAC TROMETHAMINE 30 MG/ML
30 INJECTION, SOLUTION INTRAMUSCULAR; INTRAVENOUS
Status: COMPLETED | OUTPATIENT
Start: 2023-06-07 | End: 2023-06-07

## 2023-06-07 RX ORDER — GABAPENTIN 800 MG/1
800 TABLET ORAL 3 TIMES DAILY
Qty: 90 TABLET | Refills: 5 | Status: SHIPPED | OUTPATIENT
Start: 2023-06-07 | End: 2023-12-04

## 2023-06-07 RX ADMIN — KETOROLAC TROMETHAMINE 30 MG: 30 INJECTION, SOLUTION INTRAMUSCULAR; INTRAVENOUS at 09:06
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
## 2023-06-09 ENCOUNTER — TELEPHONE (OUTPATIENT)
Dept: PAIN MEDICINE | Facility: CLINIC | Age: 38
End: 2023-06-09
Payer: MEDICAID

## 2023-06-09 DIAGNOSIS — M54.50 ACUTE EXACERBATION OF CHRONIC LOW BACK PAIN: Primary | ICD-10-CM

## 2023-06-09 DIAGNOSIS — G89.29 ACUTE EXACERBATION OF CHRONIC LOW BACK PAIN: Primary | ICD-10-CM

## 2023-06-09 RX ORDER — METHYLPREDNISOLONE 4 MG/1
TABLET ORAL
Qty: 21 EACH | Refills: 0 | Status: SHIPPED | OUTPATIENT
Start: 2023-06-09 | End: 2023-06-09

## 2023-06-09 RX ORDER — METHYLPREDNISOLONE 4 MG/1
TABLET ORAL
Qty: 21 EACH | Refills: 0 | Status: SHIPPED | OUTPATIENT
Start: 2023-06-09 | End: 2023-12-29

## 2023-06-09 NOTE — TELEPHONE ENCOUNTER
----- Message from Veronica Courtney PA-C sent at 6/9/2023 12:23 PM CDT -----  Contact: [phone]  Sent. See if she doesn't use that other pharmacy and take it off if not.    ----- Message -----  From: Toshia Weldon LPN  Sent: 6/9/2023  11:59 AM CDT  To: Veronica Courtney PA-C    Can you resend it to the [facility] on  [address]  [address]?     ----- Message -----  From: Veronica Courtney PA-C  Sent: 6/9/2023  11:04 AM CDT  To: Toshia Weldon LPN    I will send in medrol dose shirin.    ----- Message -----  From: Jammie Bryant  Sent: 6/9/2023  10:58 AM CDT  To: [facility]oz ROSAS Staff    Pt states she saw you last week for hip pain and she was told to call the office if she is not feeling better and you would prescribe medication. Pt states she is not feeling better and would like you to call something in for her. Pt requesting you tyler her.         [id number] - [place], [place] - [address] AT [address]  [address] [facility] [address]  Phone: [phone] Fax: [phone]

## 2023-06-09 NOTE — TELEPHONE ENCOUNTER
----- Message from Veronica Courtney PA-C sent at 6/9/2023 11:04 AM CDT -----  Contact: [phone]  I will send in medrol dose shirin.    ----- Message -----  From: Jammie Bryant  Sent: 6/9/2023  10:58 AM CDT  To: Roz ROSAS Staff    Pt states she saw you last week for hip pain and she was told to call the office if she is not feeling better and you would prescribe medication. Pt states she is not feeling better and would like you to call something in for her. Pt requesting you tyler her.         [id number] - [place] - [address]  [address] [place] [address]  Phone: [phone] Fax: [phone]

## 2023-06-09 NOTE — TELEPHONE ENCOUNTER
Called pt to inform her that her medication has been sent to the [facility] pharmacy on [address]. No answer, LVM to call back if she has any additional questions.

## 2023-06-09 NOTE — TELEPHONE ENCOUNTER
Returned pt call. Informed her that Veronica Courtney PA-C is sending a medrol dose pack to her [facility] pharmacy. Pt states that she will like it to be sent to the [facility] on [address]. Informed pt that I will get that information to the provider.

## 2023-06-13 ENCOUNTER — TELEPHONE (OUTPATIENT)
Dept: PAIN MEDICINE | Facility: CLINIC | Age: 38
End: 2023-06-13
Payer: MEDICAID

## 2023-06-13 NOTE — TELEPHONE ENCOUNTER
Called pt to informed her to Veronica Courtney PA-C will call her between 8-12pm tomorrow. Pt verbalized understanding.

## 2023-06-13 NOTE — TELEPHONE ENCOUNTER
----- Message from Veronica Courtney PA-C sent at 6/13/2023  2:03 PM CDT -----  Contact: sveta  Please schedule audio visit for tomorrow.    ----- Message -----  From: Debi Zamora  Sent: 6/13/2023   1:19 PM CDT  To: Roz ROSAS Staff    Patient is calling to speak with the nurse regarding appointment. Reports needing to get an appointment for an injection in her back. Please give the patient a call back at .[phone]   Thanks erik

## 2023-06-14 ENCOUNTER — OFFICE VISIT (OUTPATIENT)
Dept: PAIN MEDICINE | Facility: CLINIC | Age: 38
End: 2023-06-14
Payer: MEDICAID

## 2023-06-14 VITALS — RESPIRATION RATE: 17 BRPM

## 2023-06-14 DIAGNOSIS — M79.18 LUMBAR MUSCLE PAIN: ICD-10-CM

## 2023-06-14 DIAGNOSIS — Z98.890 HISTORY OF LUMBAR SURGERY: ICD-10-CM

## 2023-06-14 DIAGNOSIS — M46.1 SACROILIITIS: Primary | ICD-10-CM

## 2023-06-14 PROCEDURE — 1160F PR REVIEW ALL MEDS BY PRESCRIBER/CLIN PHARMACIST DOCUMENTED: ICD-10-PCS | Mod: CPTII,95,, | Performed by: PHYSICIAN ASSISTANT

## 2023-06-14 PROCEDURE — 99212 OFFICE O/P EST SF 10 MIN: CPT | Mod: 95,,, | Performed by: PHYSICIAN ASSISTANT

## 2023-06-14 PROCEDURE — 1160F RVW MEDS BY RX/DR IN RCRD: CPT | Mod: CPTII,95,, | Performed by: PHYSICIAN ASSISTANT

## 2023-06-14 PROCEDURE — 1159F PR MEDICATION LIST DOCUMENTED IN MEDICAL RECORD: ICD-10-PCS | Mod: CPTII,95,, | Performed by: PHYSICIAN ASSISTANT

## 2023-06-14 PROCEDURE — 99212 PR OFFICE/OUTPT VISIT, EST, LEVL II, 10-19 MIN: ICD-10-PCS | Mod: 95,,, | Performed by: PHYSICIAN ASSISTANT

## 2023-06-14 PROCEDURE — 1159F MED LIST DOCD IN RCRD: CPT | Mod: CPTII,95,, | Performed by: PHYSICIAN ASSISTANT
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
## 2023-06-15 ENCOUNTER — TELEPHONE (OUTPATIENT)
Dept: PAIN MEDICINE | Facility: CLINIC | Age: 38
End: 2023-06-15
Payer: MEDICAID

## 2023-06-15 NOTE — TELEPHONE ENCOUNTER
----- Message from Jose Raul Pierre sent at 6/15/2023 10:31 AM CDT -----  Contact: [phone]  Pt is calling to speak with nurse regarding injection . Please call back at [phone] . Thanksdj

## 2023-06-15 NOTE — TELEPHONE ENCOUNTER
----- Message from Jose Raul Pierre sent at 6/15/2023 10:31 AM CDT -----  Contact: [phone]  Pt is calling to speak with nurse regarding injection . Please call back at [phone] . Thanksdj

## 2023-06-15 NOTE — TELEPHONE ENCOUNTER
Spoke with patient, explained to her that the surgery scheduler Mo with reach out to her and schedule her procedure. Informed patient that it takes up to 72 hours from her appt date for Mo to reach out to her. Patient verbalized understanding. CONNOR

## 2023-06-20 NOTE — PRE-PROCEDURE INSTRUCTIONS
Attempted to PAT patient for pain procedure. No answer. LVM with return phone number. No return call at this time.

## 2023-06-23 NOTE — PRE-PROCEDURE INSTRUCTIONS
Spoke with patient regarding procedure scheduled on 6.26     Arrival time 0900     Has patient been sick with fever or on antibiotics within the last 7 days? No     Does the patient have any open wounds, sores or rashes? No     Does the patient have any recent fractures? no     Has patient received a vaccination within the last 7 days? No     Received the COVID vaccination? yes     Has the patient stopped all medications as directed? na     Does patient have a pacemaker and or defibrillator? no     Does the patient have a ride to and from procedure and someone reliable to remain with patient? mother     Is the patient diabetic? no     Does the patient have sleep apnea? Or use O2 at home? no     Is the patient receiving sedation? yes     Is the patient instructed to remain NPO beginning at midnight the night before their procedure? yes     Procedure location confirmed with patient? Yes     Covid- Denies signs/symptoms. Instructed to notify PAT/MD if any changes.

## 2023-06-26 ENCOUNTER — HOSPITAL ENCOUNTER (OUTPATIENT)
Facility: HOSPITAL | Age: 38
Discharge: HOME | End: 2023-06-26
Attending: ANESTHESIOLOGY | Admitting: ANESTHESIOLOGY
Payer: MEDICAID

## 2023-06-26 VITALS
BODY MASS INDEX: 36.17 KG/M2 | HEART RATE: 78 BPM | TEMPERATURE: 98 F | DIASTOLIC BLOOD PRESSURE: 74 MMHG | HEIGHT: 62 IN | SYSTOLIC BLOOD PRESSURE: 121 MMHG | OXYGEN SATURATION: 97 % | WEIGHT: 196.56 LBS | RESPIRATION RATE: 16 BRPM

## 2023-06-26 DIAGNOSIS — M46.1 SACROILIITIS: Primary | ICD-10-CM

## 2023-06-26 LAB
B-HCG UR QL: NEGATIVE
CTP QC/QA: YES

## 2023-06-26 PROCEDURE — 25500020 PHARM REV CODE 255: Performed by: ANESTHESIOLOGY

## 2023-06-26 PROCEDURE — 27096 PR INJECTION,SACROILIAC JOINT: ICD-10-PCS | Mod: 50,,, | Performed by: ANESTHESIOLOGY

## 2023-06-26 PROCEDURE — 81025 URINE PREGNANCY TEST: CPT | Performed by: ANESTHESIOLOGY

## 2023-06-26 PROCEDURE — 25000003 PHARM REV CODE 250: Performed by: ANESTHESIOLOGY

## 2023-06-26 PROCEDURE — 27096 INJECT SACROILIAC JOINT: CPT | Mod: 50,,, | Performed by: ANESTHESIOLOGY

## 2023-06-26 PROCEDURE — 63600175 PHARM REV CODE 636 W HCPCS: Performed by: ANESTHESIOLOGY

## 2023-06-26 PROCEDURE — 27096 INJECT SACROILIAC JOINT: CPT | Mod: LT | Performed by: ANESTHESIOLOGY

## 2023-06-26 RX ORDER — METHYLPREDNISOLONE ACETATE 40 MG/ML
INJECTION, SUSPENSION INTRA-ARTICULAR; INTRALESIONAL; INTRAMUSCULAR; SOFT TISSUE
Status: DISCONTINUED | OUTPATIENT
Start: 2023-06-26 | End: 2023-06-26 | Stop reason: HOSPADM

## 2023-06-26 RX ORDER — LIDOCAINE HYDROCHLORIDE 10 MG/ML
INJECTION, SOLUTION EPIDURAL; INFILTRATION; INTRACAUDAL; PERINEURAL
Status: DISCONTINUED | OUTPATIENT
Start: 2023-06-26 | End: 2023-06-26 | Stop reason: HOSPADM

## 2023-06-26 RX ORDER — ONDANSETRON 2 MG/ML
4 INJECTION INTRAMUSCULAR; INTRAVENOUS ONCE AS NEEDED
Status: DISCONTINUED | OUTPATIENT
Start: 2023-06-26 | End: 2023-06-26 | Stop reason: HOSPADM

## 2023-06-26 NOTE — PLAN OF CARE
Discharge instructions reviewed with patient, verbalized understanding. 1 injection site to lower back; clean, dry and intact. Voiced no complaints. Vital signs stable. Discharging home with ride.
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
## 2023-06-26 NOTE — DISCHARGE SUMMARY
Discharge Note  Short Stay      SUMMARY     Admit Date: 6/26/2023    Attending Physician: Freddie Rausch MD        Discharge Physician: Freddie Rausch MD        Discharge Date: 6/26/2023 9:26 AM    Procedure(s) (LRB):  Bilateral SIJ Injection (Bilateral)    Final Diagnosis: Sacroiliitis [M46.1]    Disposition: Home or self care    Patient Instructions:   Current Discharge Medication List        CONTINUE these medications which have NOT CHANGED    Details   gabapentin (NEURONTIN) 800 MG tablet Take 1 tablet (800 mg total) by mouth 3 (three) times daily.  Qty: 90 tablet, Refills: 5    Comments: Dr. Bam Reynolds - [facility]# [id number]  Associated Diagnoses: Neuropathic pain      albuterol (PROVENTIL/VENTOLIN HFA) 90 mcg/actuation inhaler INHALE 2 PUFFS INTO THE LUNGS THREE TIMES DAILY AS NEEDED FOR WHEEZING  Qty: 36 g, Refills: 1    Comments: **Patient requests 90 days supply**      amoxicillin-clavulanate 875-125mg (AUGMENTIN) 875-125 mg per tablet Take 1 tablet by mouth every 12 (twelve) hours.  Qty: 20 tablet, Refills: 0      fluticasone propionate (FLONASE) 50 mcg/actuation nasal spray 2 sprays (100 mcg total) by Each Nostril route once daily.  Qty: 18 g, Refills: 6      levonorgestrel (MAKEDA) 14 mcg/24 hour (3 years) IUD 1 each by Intrauterine route once.    Associated Diagnoses: Encounter for insertion of progestin-releasing intrauterine contraceptive device (IUD)      methylPREDNISolone (MEDROL DOSEPACK) 4 mg tablet use as directed  Qty: 21 each, Refills: 0    Associated Diagnoses: Acute exacerbation of chronic low back pain      montelukast (SINGULAIR) 10 mg tablet TAKE 1 TABLET(10 MG) BY MOUTH EVERY EVENING  Qty: 30 tablet, Refills: 3      naproxen (NAPROSYN) 500 MG tablet TAKE 1 TABLET(500 MG) BY MOUTH TWICE DAILY AS NEEDED FOR PAIN  Qty: 20 tablet, Refills: 0                 Discharge Diagnosis: Sacroiliitis [M46.1]  Condition on Discharge: Stable with no complications to procedure   Diet on Discharge: Same  as before.  Activity: as per instruction sheet.  Discharge to: Home with a responsible adult.  Follow up: 2-4 weeks       Please call the office at [phone] if you experience any weakness or loss of sensation, fever > 101.5, pain uncontrolled with oral medications, persistent nausea/vomiting/or diarrhea, redness or drainage from the incisions, or any other worrisome concerns. If physician on call was not reached or could not communicate with our office for any reason please go to the nearest emergency department

## 2023-06-26 NOTE — OP NOTE
Sacroiliac Joint Injection under Fluoroscopy      INFORMED CONSENT: The procedure, risks, benefits and options were discussed with patient. There are no contraindications to the procedure. The patient expressed understanding and agreed to proceed. The personnel performing the procedure was discussed.    Date of procedure 06/26/2023    Time-out taken to identify patient and procedure side prior to starting the procedure.                     PROCEDURE:  Bilateral sacroiliac joint injection under fluoroscopy.    1) Right  sacroiliac joint injection under fluoroscopy.    2) Left  sacroiliac joint injection under fluoroscopy.    Pre Procedure diagnosis:    Sacroiliitis [M46.1]  1. Sacroiliitis        Post-Procedure diagnosis:   same    PHYSICIAN: Freddie Rausch MD  ASSISTANTS: None    MEDICATIONS INJECTED: 1ml  Depo-Medrol 40mg and 3 mL Lidocaine PF 1% into each joint    LOCAL ANESTHETIC USED: 3ml Lidocaine 1%    ESTIMATED BLOOD LOSS:  None.    COMPLICATIONS:  None.      TECHNIQUE:   Laying in the prone position, the patient was prepped and draped in the usual sterile fashion using ChloraPrep and fenestrated drape.  The area was determined under fluoroscopy.  Local Xylocaine was injected by raising a wheel and going down to the periosteum using a 27-gauge hypodermic needle.  The 3.5 inch 22-gauge spinal needle was introduce into the bilateral sacroiliac joints.  Negative pressure applied to confirm no intravascular placement.  Omnipaque was injected to confirm placement and to confirm that there was no vascular runoff.  The medication was then injected slowly.  The patient tolerated the procedure well.        The patient was monitored for approximately 30 minutes after the procedure.  Patient was given post procedure and discharge instructions to follow at home.  The patient was discharged in a stable condition

## 2023-06-26 NOTE — DISCHARGE INSTRUCTIONS

## 2023-06-27 ENCOUNTER — TELEPHONE (OUTPATIENT)
Dept: PAIN MEDICINE | Facility: CLINIC | Age: 38
End: 2023-06-27
Payer: MEDICAID

## 2023-06-27 NOTE — TELEPHONE ENCOUNTER
Called pt to inform her that Dr. Rausch states that she can take over counter benadryl as needed. Pt verbalizes understanding. All questions answered.

## 2023-06-27 NOTE — TELEPHONE ENCOUNTER
----- Message from Freddie Rausch MD sent at 6/27/2023  1:40 PM CDT -----  Would recommend over counter benadryl as needed  ----- Message -----  From: Toshia Weldon LPN  Sent: 6/27/2023   1:36 PM CDT  To: Freddie Rausch MD    Pt states that she has been getting really hot after her injection. States she has been taking tylenol. Pt states no drainage, swelling, itching at her injection site. Denies nausea and vomiting.  ----- Message -----  From: Estefany Goel  Sent: 6/27/2023   9:56 AM CDT  To: [facility] Staff    Pt request a call back at [phone].Thanks

## 2023-06-27 NOTE — TELEPHONE ENCOUNTER
Returned pt call. Pt states that she know that she just has an injection yesterday. Pt states that she is in a lot of pain from injection and needs pain medications.. Informed her it can take up to 4 weeks for the injection to reach full effectiveness. We recommend the use of over the counter medications. You can alternated between tylenol (do not exceed 3 grams a day) and ibuprofen every 4-6 hours. We also recommend applying heat/ice to the injections site to help with pain and inflammation. Pt states that she has been getting really hot after her injection. States she has been taking tylenol. Pt states no drainage, swelling, itching at her injection site. Denies nausea and vomiting. Provider notified.

## 2023-06-27 NOTE — TELEPHONE ENCOUNTER
----- Message from Estefany Goel sent at 6/27/2023  9:56 AM CDT -----  Pt request a call back at [phone].Thanks

## 2023-06-28 ENCOUNTER — TELEPHONE (OUTPATIENT)
Dept: INTERNAL MEDICINE | Facility: CLINIC | Age: 38
End: 2023-06-28
Payer: MEDICAID

## 2023-06-28 NOTE — TELEPHONE ENCOUNTER
----- Message from Mike Griggs sent at 6/28/2023  4:30 PM CDT -----  Contact: Marissa  Pt is calling in regards to getting an appt due to left hip down to knee pops when pt walks. Pt stated being in a lot of pain and this has been on going for about a week. Pt also stated needing a referral. Please call back at [phone]                  Thanks  KT

## 2023-06-29 ENCOUNTER — TELEPHONE (OUTPATIENT)
Dept: INTERNAL MEDICINE | Facility: CLINIC | Age: 38
End: 2023-06-29
Payer: MEDICAID

## 2023-06-29 ENCOUNTER — HOSPITAL ENCOUNTER (OUTPATIENT)
Dept: RADIOLOGY | Facility: HOSPITAL | Age: 38
Discharge: HOME | End: 2023-06-29
Payer: MEDICAID

## 2023-06-29 ENCOUNTER — OFFICE VISIT (OUTPATIENT)
Dept: INTERNAL MEDICINE | Facility: CLINIC | Age: 38
End: 2023-06-29
Payer: MEDICAID

## 2023-06-29 VITALS
OXYGEN SATURATION: 96 % | WEIGHT: 197.31 LBS | HEART RATE: 100 BPM | HEIGHT: 62 IN | SYSTOLIC BLOOD PRESSURE: 132 MMHG | TEMPERATURE: 98 F | BODY MASS INDEX: 36.31 KG/M2 | DIASTOLIC BLOOD PRESSURE: 84 MMHG

## 2023-06-29 DIAGNOSIS — S06.9X9S TRAUMATIC BRAIN INJURY WITH LOSS OF CONSCIOUSNESS, SEQUELA: ICD-10-CM

## 2023-06-29 DIAGNOSIS — M25.552 LEFT HIP PAIN: ICD-10-CM

## 2023-06-29 DIAGNOSIS — M47.816 LUMBAR SPONDYLOSIS: ICD-10-CM

## 2023-06-29 DIAGNOSIS — M25.562 ACUTE PAIN OF LEFT KNEE: ICD-10-CM

## 2023-06-29 DIAGNOSIS — E66.01 SEVERE OBESITY (BMI 35.0-35.9 WITH COMORBIDITY): ICD-10-CM

## 2023-06-29 DIAGNOSIS — M46.1 SACROILIITIS: Primary | ICD-10-CM

## 2023-06-29 PROCEDURE — 99999 PR PBB SHADOW E&M-EST. PATIENT-LVL V: CPT | Mod: PBBFAC,,,

## 2023-06-29 PROCEDURE — 99214 PR OFFICE/OUTPT VISIT, EST, LEVL IV, 30-39 MIN: ICD-10-PCS | Mod: S$PBB,,,

## 2023-06-29 PROCEDURE — 99215 OFFICE O/P EST HI 40 MIN: CPT | Mod: PBBFAC

## 2023-06-29 PROCEDURE — 1159F MED LIST DOCD IN RCRD: CPT | Mod: CPTII,,,

## 2023-06-29 PROCEDURE — 73562 X-RAY EXAM OF KNEE 3: CPT | Mod: 26,LT,, | Performed by: RADIOLOGY

## 2023-06-29 PROCEDURE — 99214 OFFICE O/P EST MOD 30 MIN: CPT | Mod: S$PBB,,,

## 2023-06-29 PROCEDURE — 73560 X-RAY EXAM OF KNEE 1 OR 2: CPT | Mod: 26,RT,, | Performed by: RADIOLOGY

## 2023-06-29 PROCEDURE — 73503 X-RAY EXAM HIP UNI 4/> VIEWS: CPT | Mod: TC,LT

## 2023-06-29 PROCEDURE — 73503 XR HIP WITH PELVIS WHEN PERFORMED, 4 OR MORE VIEWS LEFT: ICD-10-PCS | Mod: 26,LT,, | Performed by: RADIOLOGY

## 2023-06-29 PROCEDURE — 73560 XR KNEE ORTHO LEFT: ICD-10-PCS | Mod: 26,RT,, | Performed by: RADIOLOGY

## 2023-06-29 PROCEDURE — 3079F PR MOST RECENT DIASTOLIC BLOOD PRESSURE 80-89 MM HG: ICD-10-PCS | Mod: CPTII,,,

## 2023-06-29 PROCEDURE — 1160F PR REVIEW ALL MEDS BY PRESCRIBER/CLIN PHARMACIST DOCUMENTED: ICD-10-PCS | Mod: CPTII,,,

## 2023-06-29 PROCEDURE — 3079F DIAST BP 80-89 MM HG: CPT | Mod: CPTII,,,

## 2023-06-29 PROCEDURE — 3075F PR MOST RECENT SYSTOLIC BLOOD PRESS GE 130-139MM HG: ICD-10-PCS | Mod: CPTII,,,

## 2023-06-29 PROCEDURE — 73560 X-RAY EXAM OF KNEE 1 OR 2: CPT | Mod: 59,TC,RT

## 2023-06-29 PROCEDURE — 73562 XR KNEE ORTHO LEFT: ICD-10-PCS | Mod: 26,LT,, | Performed by: RADIOLOGY

## 2023-06-29 PROCEDURE — 3075F SYST BP GE 130 - 139MM HG: CPT | Mod: CPTII,,,

## 2023-06-29 PROCEDURE — 1159F PR MEDICATION LIST DOCUMENTED IN MEDICAL RECORD: ICD-10-PCS | Mod: CPTII,,,

## 2023-06-29 PROCEDURE — 99999 PR PBB SHADOW E&M-EST. PATIENT-LVL V: ICD-10-PCS | Mod: PBBFAC,,,

## 2023-06-29 PROCEDURE — 73503 X-RAY EXAM HIP UNI 4/> VIEWS: CPT | Mod: 26,LT,, | Performed by: RADIOLOGY

## 2023-06-29 PROCEDURE — 1160F RVW MEDS BY RX/DR IN RCRD: CPT | Mod: CPTII,,,

## 2023-06-29 PROCEDURE — 3008F PR BODY MASS INDEX (BMI) DOCUMENTED: ICD-10-PCS | Mod: CPTII,,,

## 2023-06-29 PROCEDURE — 3008F BODY MASS INDEX DOCD: CPT | Mod: CPTII,,,

## 2023-06-29 RX ORDER — NAPROXEN 500 MG/1
TABLET ORAL
Qty: 30 TABLET | Refills: 0 | Status: SHIPPED | OUTPATIENT
Start: 2023-06-29 | End: 2023-12-29 | Stop reason: SDUPTHER
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
## 2023-08-07 ENCOUNTER — TELEPHONE (OUTPATIENT)
Dept: PAIN MEDICINE | Facility: CLINIC | Age: 38
End: 2023-08-07
Payer: MEDICAID

## 2023-08-08 ENCOUNTER — DOCUMENTATION ONLY (OUTPATIENT)
Dept: PAIN MEDICINE | Facility: CLINIC | Age: 38
End: 2023-08-08
Payer: MEDICAID
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
## 2023-09-18 ENCOUNTER — TELEPHONE (OUTPATIENT)
Dept: PAIN MEDICINE | Facility: CLINIC | Age: 38
End: 2023-09-18
Payer: MEDICAID

## 2023-09-18 NOTE — TELEPHONE ENCOUNTER
----- Message from Vanessaaldair Wise sent at 9/18/2023  1:33 PM CDT -----  Contact: PT [phone]  Patient is calling for Medical Advice regarding:--Sciatic nerve and very painful--    How long has patient had these symptoms:--6-months--    Pharmacy name and phone#:  [id number] - [place], [place] - [address] AT [facility]  [address]  [facility] [address]  Phone: [phone] Fax: [phone]    Would like response via [facility]:  --Call back--    Comments:PT calling to see if she able to come in and get injection for the symptoms listed above? Please call to advise.

## 2023-09-19 ENCOUNTER — TELEPHONE (OUTPATIENT)
Dept: PAIN MEDICINE | Facility: CLINIC | Age: 38
End: 2023-09-19
Payer: MEDICAID

## 2023-09-19 NOTE — TELEPHONE ENCOUNTER
----- Message from Jose Raul Pierre sent at 9/19/2023 12:09 PM CDT -----  Contact: [phone]  Pt is calling regarding injections appt . please call back at [phone]  thanksdj

## 2023-09-19 NOTE — TELEPHONE ENCOUNTER
----- Message from Venus Schafer sent at 9/19/2023  4:10 PM CDT -----  Contact: ronald  .Type:  Patient Returning Call    Who Called:Ronald  Who Left Message for Patient: Mark Mendoza MA  Does the patient know what this is regarding?:yes  Would the patient rather a call back or a response via [facility]? Call   Best Call Back Number: .[phone]

## 2023-09-19 NOTE — TELEPHONE ENCOUNTER
Returned pt call. Pt states that she is having hip pains. Pt states that she is needing transportation to her appointments. Informed her that I will send a message over to our supervisor. Pt verbalized understanding.

## 2023-09-21 DIAGNOSIS — M79.18 LUMBAR MUSCLE PAIN: Primary | ICD-10-CM

## 2023-09-21 RX ORDER — TIZANIDINE 4 MG/1
4 TABLET ORAL 2 TIMES DAILY PRN
Qty: 60 TABLET | Refills: 1 | Status: SHIPPED | OUTPATIENT
Start: 2023-09-21 | End: 2023-12-04

## 2023-09-22 ENCOUNTER — TELEPHONE (OUTPATIENT)
Dept: PAIN MEDICINE | Facility: CLINIC | Age: 38
End: 2023-09-22
Payer: MEDICAID

## 2023-09-22 NOTE — TELEPHONE ENCOUNTER
----- Message from Freddie Rausch MD sent at 9/21/2023  2:29 PM CDT -----  Regarding: RE: pain left hip, leg to foot  Contact: Marissa  Tizanidine medication called into pharmacy  ----- Message -----  From: Moustapha Olvera MA  Sent: 9/21/2023   1:40 PM CDT  To: Freddie Rausch MD  Subject: pain left hip, leg to foot                       Good Afternoon Dr. Rausch,    Patient has pain on left side from hip to foot.  Patient is taking gabapentin 800mg tid and tylenol arthritis with no relief.  Patient has an appointment with Veronica HOLLEY on 11/21/23.  Patient asking for sooner appt or something to help[ relieve pain.  Pharmacy is [facility].     Nery  ----- Message -----  From: Moustapha Olvera MA  Sent: 9/21/2023   1:29 PM CDT  To: Moustapha Olvera MA      ----- Message -----  From: Mike Griggs  Sent: 9/21/2023   1:22 PM CDT  To: [facility] Staff    .Type:  Patient Returning Call    Who Called:Marissa Guy Left Message for Patient:Nurse  Does the patient know what this is regarding?:Yes  Would the patient rather a call back or a response via [facility]? Call back   Best Call Back Number:[phone]  Additional Information: Pt stated having sciatic pain that goes down her left leg, pt stated hurts to walk and needs transportation                    Thanks  KT

## 2023-09-22 NOTE — TELEPHONE ENCOUNTER
Called patient to inform her that Dr. Rausch sent tizanidine to her pharmacy,[facility].

## 2023-09-27 ENCOUNTER — TELEPHONE (OUTPATIENT)
Dept: PAIN MEDICINE | Facility: CLINIC | Age: 38
End: 2023-09-27
Payer: MEDICAID

## 2023-11-01 ENCOUNTER — TELEPHONE (OUTPATIENT)
Dept: PAIN MEDICINE | Facility: CLINIC | Age: 38
End: 2023-11-01
Payer: MEDICAID

## 2023-11-09 ENCOUNTER — TELEPHONE (OUTPATIENT)
Dept: PAIN MEDICINE | Facility: CLINIC | Age: 38
End: 2023-11-09
Payer: MEDICAID

## 2023-11-09 NOTE — TELEPHONE ENCOUNTER
----- Message from Mark Mendoza MA sent at 11/9/2023 11:35 AM CST -----  Contact: Marissa HOOK, patient is returning your call.   ----- Message -----  From: Maine Farah  Sent: 11/9/2023  11:00 AM CST  To: Roz ROSAS Staff    Pt called to marjorie sandoval her 11/21 appt. Please call her back at [phone]    Thanks  TS

## 2023-11-14 ENCOUNTER — TELEPHONE (OUTPATIENT)
Dept: PAIN MEDICINE | Facility: CLINIC | Age: 38
End: 2023-11-14
Payer: MEDICAID

## 2023-11-14 NOTE — TELEPHONE ENCOUNTER
Spoke with patient, informed her that she has a upcoming appt with  PA on 11/21/2023. Explained to her at that appointment if KISHAN Courtney thinks another injection is the perfect fix, she will be setup at that appointment. Patient verbalized understanding. CONNOR

## 2023-11-14 NOTE — TELEPHONE ENCOUNTER
----- Message from Cecelia Basurto sent at 11/14/2023 10:12 AM CST -----  States she would like to schedule an injection for her sciatic nerve, it hurts in her hip, leg and back. Nothing coming up on the schedule. Please call pt [phone]. Thank you

## 2023-12-04 DIAGNOSIS — M79.2 NEUROPATHIC PAIN: ICD-10-CM

## 2023-12-04 DIAGNOSIS — M79.18 LUMBAR MUSCLE PAIN: ICD-10-CM

## 2023-12-04 RX ORDER — GABAPENTIN 800 MG/1
800 TABLET ORAL 3 TIMES DAILY
Qty: 90 TABLET | Refills: 0 | Status: SHIPPED | OUTPATIENT
Start: 2023-12-04 | End: 2023-12-15 | Stop reason: SDUPTHER

## 2023-12-04 RX ORDER — TIZANIDINE 4 MG/1
TABLET ORAL
Qty: 60 TABLET | Refills: 1 | Status: SHIPPED | OUTPATIENT
Start: 2023-12-04 | End: 2024-02-21

## 2023-12-04 NOTE — TELEPHONE ENCOUNTER
Called patient regarding Rx refill for gabapentin received from pt pharmacy. Pt number is not longer is service.

## 2023-12-05 ENCOUNTER — PATIENT MESSAGE (OUTPATIENT)
Dept: PAIN MEDICINE | Facility: CLINIC | Age: 38
End: 2023-12-05
Payer: MEDICAID

## 2023-12-05 ENCOUNTER — TELEPHONE (OUTPATIENT)
Dept: PAIN MEDICINE | Facility: CLINIC | Age: 38
End: 2023-12-05
Payer: MEDICAID

## 2023-12-05 NOTE — TELEPHONE ENCOUNTER
Attempted to call pt to schedule appointment. Pt number is not in service. Attempted to call pt mother's number. No answer, LVM to call back at earliest convenience. My chart message sent.

## 2023-12-05 NOTE — TELEPHONE ENCOUNTER
----- Message from Toshia Weldon LPN sent at 12/4/2023  4:11 PM CST -----  Contact: Marissa    ----- Message -----  From: Veronica Courtney PA-C  Sent: 12/4/2023   3:28 PM CST  To: Toshia Weldon LPN    Audio dec 15 Friday [facility] or any day that week.   ----- Message -----  From: Toshia Weldon LPN  Sent: 12/4/2023   2:57 PM CST  To: Veronica Courtney PA-C    Override a slot?   ----- Message -----  From: Veronica Courtney PA-C  Sent: 12/4/2023   1:59 PM CST  To: Toshia Weldon LPN    Please schedule audio visit with me this month; cancel appointment with Dr. Rausch.   ----- Message -----  From: Omero Willis  Sent: 12/4/2023   1:12 PM CST  To: [facility]oz ROSAS Staff    Type:  RX Refill Request    Who Called: Marissa   Refbernie or New Rx:refill  RX Name and Strength:gabapentin (NEURONTIN) 800 MG tablet  How is the patient currently taking it? (ex. 1XDay):3xday  Is this a 30 day or 90 day RX:90day  Preferred Pharmacy with phone number:  [id number] [place], [place] - [address] [facility]  [address]  [facility] [address]  Phone: [phone] Fax: [phone]  Local or Mail Order:local  Ordering Provider: Veronica CORMIER  Would the patient rather a call back or a response via [facility]? Call back   Best Call Back Number:[phone]  Additional Information:   Thanks   Am

## 2023-12-08 ENCOUNTER — TELEPHONE (OUTPATIENT)
Dept: PAIN MEDICINE | Facility: CLINIC | Age: 38
End: 2023-12-08
Payer: MEDICAID

## 2023-12-08 NOTE — TELEPHONE ENCOUNTER
----- Message from Flower Abdullahi sent at 12/8/2023 10:51 AM CST -----  Contact: Marissa  Type:  Patient Returning Call    Who Called:Marissa  Who Left Message for Patient:unknown  Does the patient know what this is regarding?:unknown  Would the patient rather a call back or a response via [facility]? call  Best Call Back Number:[phone]  Additional Information: Patient reports Mom received a call and request another call back.   Thank you,  GH

## 2023-12-08 NOTE — TELEPHONE ENCOUNTER
Spoke with patient regarding 12/5/23 message.  Informed her that prescription for gabapentin was sent in and that Mrs. Courtney want to do a audio visit on 12/15/23 do not schedule because uncertain of time will inquire Monday and call patient back

## 2023-12-11 ENCOUNTER — TELEPHONE (OUTPATIENT)
Dept: PAIN MEDICINE | Facility: CLINIC | Age: 38
End: 2023-12-11
Payer: MEDICAID

## 2023-12-13 ENCOUNTER — TELEPHONE (OUTPATIENT)
Dept: PAIN MEDICINE | Facility: CLINIC | Age: 38
End: 2023-12-13
Payer: MEDICAID

## 2023-12-13 NOTE — TELEPHONE ENCOUNTER
Returned pt call.Informed pt that she did not miss her appointment. Informed her that her appointment is on 12/15 and that Veronica HOLLEY will be giving her a call between 9a-1p. Pt verbalized understanding.

## 2023-12-13 NOTE — TELEPHONE ENCOUNTER
"----- Message from Yin Pickering sent at 12/13/2023 12:06 PM CST -----  Regarding: reschedule  Contact: [phone]  Name Of Caller: self     Contact Preference?:  [phone]     What is the nature of the call?: would like to reschedule her appt she missed today     Additional Notes:    "Thank you for all that you do for our patients"        "

## 2023-12-15 ENCOUNTER — OFFICE VISIT (OUTPATIENT)
Dept: PAIN MEDICINE | Facility: CLINIC | Age: 38
End: 2023-12-15
Payer: MEDICAID

## 2023-12-15 VITALS — HEIGHT: 62 IN | BODY MASS INDEX: 36.09 KG/M2

## 2023-12-15 DIAGNOSIS — M79.2 NEUROPATHIC PAIN: ICD-10-CM

## 2023-12-15 DIAGNOSIS — M79.18 LUMBAR MUSCLE PAIN: Primary | ICD-10-CM

## 2023-12-15 DIAGNOSIS — M46.1 SACROILIITIS: ICD-10-CM

## 2023-12-15 DIAGNOSIS — Z98.890 HISTORY OF LUMBAR SURGERY: ICD-10-CM

## 2023-12-15 PROCEDURE — 1160F PR REVIEW ALL MEDS BY PRESCRIBER/CLIN PHARMACIST DOCUMENTED: ICD-10-PCS | Mod: CPTII,95,, | Performed by: PHYSICIAN ASSISTANT

## 2023-12-15 PROCEDURE — 99443 PR PHYSICIAN TELEPHONE EVALUATION 21-30 MIN: CPT | Mod: 95,,, | Performed by: PHYSICIAN ASSISTANT

## 2023-12-15 PROCEDURE — 1159F MED LIST DOCD IN RCRD: CPT | Mod: CPTII,95,, | Performed by: PHYSICIAN ASSISTANT

## 2023-12-15 PROCEDURE — 99443 PR PHYSICIAN TELEPHONE EVALUATION 21-30 MIN: ICD-10-PCS | Mod: 95,,, | Performed by: PHYSICIAN ASSISTANT

## 2023-12-15 PROCEDURE — 1160F RVW MEDS BY RX/DR IN RCRD: CPT | Mod: CPTII,95,, | Performed by: PHYSICIAN ASSISTANT

## 2023-12-15 PROCEDURE — 1159F PR MEDICATION LIST DOCUMENTED IN MEDICAL RECORD: ICD-10-PCS | Mod: CPTII,95,, | Performed by: PHYSICIAN ASSISTANT

## 2023-12-15 RX ORDER — GABAPENTIN 800 MG/1
800 TABLET ORAL 3 TIMES DAILY
Qty: 90 TABLET | Refills: 5 | Status: SHIPPED | OUTPATIENT
Start: 2023-12-15 | End: 2024-01-26 | Stop reason: SDUPTHER
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
## 2023-12-29 ENCOUNTER — TELEPHONE (OUTPATIENT)
Dept: PAIN MEDICINE | Facility: CLINIC | Age: 38
End: 2023-12-29
Payer: MEDICAID

## 2023-12-29 ENCOUNTER — OFFICE VISIT (OUTPATIENT)
Dept: PAIN MEDICINE | Facility: CLINIC | Age: 38
End: 2023-12-29
Payer: MEDICAID

## 2023-12-29 VITALS
WEIGHT: 209.69 LBS | HEART RATE: 95 BPM | DIASTOLIC BLOOD PRESSURE: 90 MMHG | BODY MASS INDEX: 38.59 KG/M2 | HEIGHT: 62 IN | SYSTOLIC BLOOD PRESSURE: 147 MMHG

## 2023-12-29 DIAGNOSIS — S06.9X9S CLOSED TRAUMATIC BRAIN INJURY WITH LOSS OF CONSCIOUSNESS, SEQUELA: ICD-10-CM

## 2023-12-29 DIAGNOSIS — M51.36 DDD (DEGENERATIVE DISC DISEASE), LUMBAR: ICD-10-CM

## 2023-12-29 DIAGNOSIS — M79.2 NEUROPATHIC PAIN: ICD-10-CM

## 2023-12-29 DIAGNOSIS — M46.1 SACROILIITIS: Primary | ICD-10-CM

## 2023-12-29 DIAGNOSIS — M47.816 LUMBAR SPONDYLOSIS: ICD-10-CM

## 2023-12-29 DIAGNOSIS — M25.552 LEFT HIP PAIN: ICD-10-CM

## 2023-12-29 DIAGNOSIS — M25.562 ACUTE PAIN OF LEFT KNEE: ICD-10-CM

## 2023-12-29 PROCEDURE — 99999PBSHW PR PBB SHADOW TECHNICAL ONLY FILED TO HB: Mod: PBBFAC,,,

## 2023-12-29 PROCEDURE — 99999 PR PBB SHADOW E&M-EST. PATIENT-LVL III: CPT | Mod: PBBFAC,,, | Performed by: ANESTHESIOLOGY

## 2023-12-29 PROCEDURE — 3077F PR MOST RECENT SYSTOLIC BLOOD PRESSURE >= 140 MM HG: ICD-10-PCS | Mod: CPTII,,, | Performed by: ANESTHESIOLOGY

## 2023-12-29 PROCEDURE — 20552 NJX 1/MLT TRIGGER POINT 1/2: CPT | Mod: PBBFAC | Performed by: ANESTHESIOLOGY

## 2023-12-29 PROCEDURE — 99999PBSHW PR PBB SHADOW TECHNICAL ONLY FILED TO HB: ICD-10-PCS | Mod: PBBFAC,,,

## 2023-12-29 PROCEDURE — 99213 OFFICE O/P EST LOW 20 MIN: CPT | Mod: PBBFAC | Performed by: ANESTHESIOLOGY

## 2023-12-29 PROCEDURE — 3080F DIAST BP >= 90 MM HG: CPT | Mod: CPTII,,, | Performed by: ANESTHESIOLOGY

## 2023-12-29 PROCEDURE — 20552 PR INJECT TRIGGER POINT, 1 OR 2: ICD-10-PCS | Mod: S$PBB,,, | Performed by: ANESTHESIOLOGY

## 2023-12-29 PROCEDURE — 3077F SYST BP >= 140 MM HG: CPT | Mod: CPTII,,, | Performed by: ANESTHESIOLOGY

## 2023-12-29 PROCEDURE — 3008F BODY MASS INDEX DOCD: CPT | Mod: CPTII,,, | Performed by: ANESTHESIOLOGY

## 2023-12-29 PROCEDURE — 99214 PR OFFICE/OUTPT VISIT, EST, LEVL IV, 30-39 MIN: ICD-10-PCS | Mod: S$PBB,25,, | Performed by: ANESTHESIOLOGY

## 2023-12-29 PROCEDURE — 99999 PR PBB SHADOW E&M-EST. PATIENT-LVL III: ICD-10-PCS | Mod: PBBFAC,,, | Performed by: ANESTHESIOLOGY

## 2023-12-29 PROCEDURE — 20552 NJX 1/MLT TRIGGER POINT 1/2: CPT | Mod: S$PBB,,, | Performed by: ANESTHESIOLOGY

## 2023-12-29 PROCEDURE — 3080F PR MOST RECENT DIASTOLIC BLOOD PRESSURE >= 90 MM HG: ICD-10-PCS | Mod: CPTII,,, | Performed by: ANESTHESIOLOGY

## 2023-12-29 PROCEDURE — 1159F MED LIST DOCD IN RCRD: CPT | Mod: CPTII,,, | Performed by: ANESTHESIOLOGY

## 2023-12-29 PROCEDURE — 1159F PR MEDICATION LIST DOCUMENTED IN MEDICAL RECORD: ICD-10-PCS | Mod: CPTII,,, | Performed by: ANESTHESIOLOGY

## 2023-12-29 PROCEDURE — 3008F PR BODY MASS INDEX (BMI) DOCUMENTED: ICD-10-PCS | Mod: CPTII,,, | Performed by: ANESTHESIOLOGY

## 2023-12-29 PROCEDURE — 99214 OFFICE O/P EST MOD 30 MIN: CPT | Mod: S$PBB,25,, | Performed by: ANESTHESIOLOGY

## 2023-12-29 RX ORDER — TRAMADOL HYDROCHLORIDE 50 MG/1
50 TABLET ORAL EVERY 8 HOURS PRN
Qty: 21 TABLET | Refills: 0 | Status: SHIPPED | OUTPATIENT
Start: 2023-12-29 | End: 2024-02-21

## 2023-12-29 RX ORDER — KETOROLAC TROMETHAMINE 30 MG/ML
15 INJECTION, SOLUTION INTRAMUSCULAR; INTRAVENOUS ONCE
Status: COMPLETED | OUTPATIENT
Start: 2023-12-29 | End: 2023-12-29

## 2023-12-29 RX ORDER — NAPROXEN 500 MG/1
TABLET ORAL
Qty: 30 TABLET | Refills: 0 | Status: SHIPPED | OUTPATIENT
Start: 2023-12-29 | End: 2024-02-13

## 2023-12-29 RX ORDER — KETOROLAC TROMETHAMINE 30 MG/ML
15 INJECTION, SOLUTION INTRAMUSCULAR; INTRAVENOUS ONCE
Status: DISCONTINUED | OUTPATIENT
Start: 2023-12-29 | End: 2023-12-29

## 2023-12-29 RX ADMIN — KETOROLAC TROMETHAMINE 15 MG: 30 INJECTION, SOLUTION INTRAMUSCULAR; INTRAVENOUS at 10:12

## 2023-12-29 NOTE — TELEPHONE ENCOUNTER
----- Message from Veronica Courtney PA-C sent at 12/29/2023  1:00 PM CST -----  Rx was sent on 12/15 with 5 Refills -- so would last until June of next year  ----- Message -----  From: Veronica Courtney PA-C  Sent: 12/29/2023  12:59 PM CST  To: [facility] Staff    Dr. Rausch saw her today. I will send in gabapentin   ----- Message -----  From: Toshia Alexandra  Sent: 12/29/2023  12:57 PM CST  To: [facility] Staff    Pt states she needs a refill for gabapentin. Call back at [phone]    [id number] [place], [place] - [address] AT [facility]  [address]  [facility] [address]  Phone: [phone] Fax: [phone]
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
## 2023-12-29 NOTE — TELEPHONE ENCOUNTER
----- Message from Jose Raul Pierre sent at 12/29/2023  8:23 AM CST -----  Contact: [phone]  Type:  Patient Returning Call    Who Called:Marissa   Who Left Message for Patient:nurse   Does the patient know what this is regarding?:no   Would the patient rather a call back or a response via [facility]? Call back   Best Call Back Number:[phone]  Additional Information:

## 2024-01-02 ENCOUNTER — TELEPHONE (OUTPATIENT)
Dept: PAIN MEDICINE | Facility: CLINIC | Age: 39
End: 2024-01-02
Payer: MEDICAID

## 2024-01-02 NOTE — PRE-PROCEDURE INSTRUCTIONS
Spoke with patient regarding procedure scheduled on 1-10     Arrival time 0600     Has patient been sick with fever or on antibiotics within the last 7 days? No     Does the patient have any open wounds, sores or rashes? No     Does the patient have any recent fractures? no     Has patient received a vaccination within the last 7 days? No     Received the COVID vaccination?      Has the patient stopped all medications as directed? na     Does patient have a pacemaker, defibrillator, or implantable stimulator? No     Does the patient have a ride to and from procedure and someone reliable to remain with patient? mom     Is the patient diabetic? no     Does the patient have sleep apnea? Or use O2 at home? denies     Is the patient receiving sedation?      Is the patient instructed to remain NPO beginning at midnight the night before their procedure? yes     Procedure location confirmed with patient? Yes     Covid- Denies signs/symptoms. Instructed to notify PAT/MD if any changes

## 2024-01-02 NOTE — TELEPHONE ENCOUNTER
----- Message from Flower Abdullahi sent at 1/2/2024 12:38 PM CST -----  Contact: Marissa  Patient is calling to speak with someone regarding procedure. Patient request to confirm time of procedure. Please give patient a call back at [phone] when possible.  Thank you,  GH

## 2024-01-22 ENCOUNTER — TELEPHONE (OUTPATIENT)
Dept: PAIN MEDICINE | Facility: CLINIC | Age: 39
End: 2024-01-22
Payer: MEDICAID

## 2024-01-22 ENCOUNTER — HOSPITAL ENCOUNTER (OUTPATIENT)
Facility: HOSPITAL | Age: 39
Discharge: HOME | End: 2024-01-22
Attending: ANESTHESIOLOGY | Admitting: ANESTHESIOLOGY
Payer: MEDICAID

## 2024-01-22 VITALS
BODY MASS INDEX: 37.18 KG/M2 | TEMPERATURE: 97 F | HEIGHT: 62 IN | SYSTOLIC BLOOD PRESSURE: 132 MMHG | HEART RATE: 105 BPM | DIASTOLIC BLOOD PRESSURE: 62 MMHG | WEIGHT: 202.06 LBS | RESPIRATION RATE: 14 BRPM | OXYGEN SATURATION: 94 %

## 2024-01-22 DIAGNOSIS — M53.3 SACROILIAC JOINT DYSFUNCTION: ICD-10-CM

## 2024-01-22 DIAGNOSIS — M46.1 SACROILIITIS: Primary | ICD-10-CM

## 2024-01-22 LAB
B-HCG UR QL: NEGATIVE
CTP QC/QA: YES

## 2024-01-22 PROCEDURE — 25500020 PHARM REV CODE 255: Performed by: ANESTHESIOLOGY

## 2024-01-22 PROCEDURE — 81025 URINE PREGNANCY TEST: CPT | Performed by: ANESTHESIOLOGY

## 2024-01-22 PROCEDURE — 25000003 PHARM REV CODE 250: Performed by: ANESTHESIOLOGY

## 2024-01-22 PROCEDURE — 63600175 PHARM REV CODE 636 W HCPCS: Performed by: ANESTHESIOLOGY

## 2024-01-22 PROCEDURE — 27096 INJECT SACROILIAC JOINT: CPT | Mod: 50,,, | Performed by: ANESTHESIOLOGY

## 2024-01-22 PROCEDURE — 27096 INJECT SACROILIAC JOINT: CPT | Mod: 50 | Performed by: ANESTHESIOLOGY

## 2024-01-22 RX ORDER — MIDAZOLAM HYDROCHLORIDE 1 MG/ML
INJECTION, SOLUTION INTRAMUSCULAR; INTRAVENOUS
Status: DISCONTINUED | OUTPATIENT
Start: 2024-01-22 | End: 2024-01-22 | Stop reason: HOSPADM

## 2024-01-22 RX ORDER — BUPIVACAINE HYDROCHLORIDE 2.5 MG/ML
INJECTION, SOLUTION EPIDURAL; INFILTRATION; INTRACAUDAL
Status: DISCONTINUED | OUTPATIENT
Start: 2024-01-22 | End: 2024-01-22 | Stop reason: HOSPADM

## 2024-01-22 RX ORDER — ONDANSETRON HYDROCHLORIDE 2 MG/ML
4 INJECTION, SOLUTION INTRAVENOUS ONCE AS NEEDED
Status: DISCONTINUED | OUTPATIENT
Start: 2024-01-22 | End: 2024-01-22 | Stop reason: HOSPADM

## 2024-01-22 RX ORDER — ONDANSETRON HYDROCHLORIDE 2 MG/ML
4 INJECTION, SOLUTION INTRAVENOUS ONCE AS NEEDED
Status: ACTIVE | OUTPATIENT
Start: 2024-01-22 | End: 2035-06-20

## 2024-01-22 RX ORDER — LIDOCAINE HYDROCHLORIDE 10 MG/ML
INJECTION, SOLUTION EPIDURAL; INFILTRATION; INTRACAUDAL; PERINEURAL
Status: DISCONTINUED | OUTPATIENT
Start: 2024-01-22 | End: 2024-01-22 | Stop reason: HOSPADM

## 2024-01-22 RX ORDER — FENTANYL CITRATE 50 UG/ML
INJECTION, SOLUTION INTRAMUSCULAR; INTRAVENOUS
Status: DISCONTINUED | OUTPATIENT
Start: 2024-01-22 | End: 2024-01-22 | Stop reason: HOSPADM

## 2024-01-22 RX ORDER — METHYLPREDNISOLONE ACETATE 40 MG/ML
INJECTION, SUSPENSION INTRA-ARTICULAR; INTRALESIONAL; INTRAMUSCULAR; SOFT TISSUE
Status: DISCONTINUED | OUTPATIENT
Start: 2024-01-22 | End: 2024-01-22 | Stop reason: HOSPADM

## 2024-01-22 NOTE — OP NOTE
Sacroiliac Joint Injection under Fluoroscopy      INFORMED CONSENT: The procedure, risks, benefits and options were discussed with patient. There are no contraindications to the procedure. The patient expressed understanding and agreed to proceed. The personnel performing the procedure was discussed.    Date of procedure 01/22/2024    Time-out taken to identify patient and procedure side prior to starting the procedure.                     PROCEDURE:  Bilateral sacroiliac joint injection under fluoroscopy.    1) Right  sacroiliac joint injection under fluoroscopy.    2) Left  sacroiliac joint injection under fluoroscopy.    Pre Procedure diagnosis:    Sacroiliitis [M46.1]  1. Sacroiliitis    2. Sacroiliac joint dysfunction        Post-Procedure diagnosis:   same    PHYSICIAN: Freddie Rausch MD  ASSISTANTS: None    MEDICATIONS INJECTED: 1ml  Depo-Medrol 40mg and 3 mL Lidocaine PF 1% into each joint    LOCAL ANESTHETIC USED: 3ml Lidocaine 1%    ESTIMATED BLOOD LOSS:  None.    COMPLICATIONS:  None.    Sedation: Conscious sedation provided by M.D    SEDATION MEDICATIONS: local/IV sedation: Versed 2 mg and fentanyl 75 mcg IV.  Conscious sedation ordered by MD.  Patient reevaluated and sedation administered by MD and monitored by RN.  Total sedation time was less than 10 min.       Total sedation time was <10 min      TECHNIQUE:   Laying in the prone position, the patient was prepped and draped in the usual sterile fashion using ChloraPrep and fenestrated drape.  The area was determined under fluoroscopy.  Local Xylocaine was injected by raising a wheel and going down to the periosteum using a 27-gauge hypodermic needle.  The 3.5 inch 22-gauge spinal needle was introduce into the bilateral sacroiliac joints.  Negative pressure applied to confirm no intravascular placement.  Omnipaque was injected to confirm placement and to confirm that there was no vascular runoff.  The medication was then injected slowly.  The  patient tolerated the procedure well.        The patient was monitored for approximately 30 minutes after the procedure.  Patient was given post procedure and discharge instructions to follow at home.  The patient was discharged in a stable condition
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
## 2024-01-22 NOTE — DISCHARGE INSTRUCTIONS

## 2024-01-22 NOTE — DISCHARGE SUMMARY
Discharge Note  Short Stay      SUMMARY     Admit Date: 1/22/2024    Attending Physician: Freddie Rausch MD        Discharge Physician: Freddie Rausch MD        Discharge Date: 1/22/2024 12:19 PM    Procedure(s) (LRB):  Bilateral SIJ Injection (Bilateral)    Final Diagnosis: Sacroiliitis [M46.1]    Disposition: Home or self care    Patient Instructions:   Current Discharge Medication List        CONTINUE these medications which have NOT CHANGED    Details   gabapentin (NEURONTIN) 800 MG tablet Take 1 tablet (800 mg total) by mouth 3 (three) times daily.  Qty: 90 tablet, Refills: 5    Associated Diagnoses: Neuropathic pain      levonorgestrel (MAKEDA) 14 mcg/24 hour (3 years) IUD 1 each by Intrauterine route once.    Associated Diagnoses: Encounter for insertion of progestin-releasing intrauterine contraceptive device (IUD)      montelukast (SINGULAIR) 10 mg tablet TAKE 1 TABLET(10 MG) BY MOUTH EVERY EVENING  Qty: 30 tablet, Refills: 3      tiZANidine (ZANAFLEX) 4 MG tablet TAKE 1 TABLET(4 MG) BY MOUTH TWICE DAILY AS NEEDED FOR PAIN  Qty: 60 tablet, Refills: 1    Associated Diagnoses: Lumbar muscle pain      traMADoL (ULTRAM) 50 mg tablet Take 1 tablet (50 mg total) by mouth every 8 (eight) hours as needed for Pain.  Qty: 21 tablet, Refills: 0    Comments: Greater than 7 day supply medically necessary.  Associated Diagnoses: Sacroiliitis; DDD (degenerative disc disease), lumbar; Lumbar spondylosis; Neuropathic pain      albuterol (PROVENTIL/VENTOLIN HFA) 90 mcg/actuation inhaler INHALE 2 PUFFS INTO THE LUNGS THREE TIMES DAILY AS NEEDED FOR WHEEZING  Qty: 36 g, Refills: 1    Comments: **Patient requests 90 days supply**      amoxicillin-clavulanate 875-125mg (AUGMENTIN) 875-125 mg per tablet Take 1 tablet by mouth every 12 (twelve) hours.  Qty: 20 tablet, Refills: 0      fluticasone propionate (FLONASE) 50 mcg/actuation nasal spray 2 sprays (100 mcg total) by Each Nostril route once daily.  Qty: 18 g, Refills: 6       naproxen (NAPROSYN) 500 MG tablet TAKE 1 TABLET(500 MG) BY MOUTH TWICE DAILY AS NEEDED FOR PAIN  Qty: 30 tablet, Refills: 0    Associated Diagnoses: Sacroiliitis; Lumbar spondylosis; Left hip pain; Acute pain of left knee                 Discharge Diagnosis: Sacroiliitis [M46.1]  Condition on Discharge: Stable with no complications to procedure   Diet on Discharge: Same as before.  Activity: as per instruction sheet.  Discharge to: Home with a responsible adult.  Follow up: 2-4 weeks       Please call the office at [phone] if you experience any weakness or loss of sensation, fever > 101.5, pain uncontrolled with oral medications, persistent nausea/vomiting/or diarrhea, redness or drainage from the incisions, or any other worrisome concerns. If physician on call was not reached or could not communicate with our office for any reason please go to the nearest emergency department

## 2024-01-22 NOTE — TELEPHONE ENCOUNTER
----- Message from Ketty Cade sent at 1/22/2024 12:59 PM CST -----  Regarding: pt meds  Name of Who is Calling:Pt         What is the request in detail: Requesting call back in regards to needing pain meds after procedure           Can the clinic reply by [facility]:         What Number to Call Back if not in [facility]:Telephone Information:  [employer]          [phone]

## 2024-01-22 NOTE — TELEPHONE ENCOUNTER
Spoke to patient informed her to continue current meds and she can use OTC meds like tylenol or IBU.  Patient verbalized understanding.

## 2024-01-26 ENCOUNTER — TELEPHONE (OUTPATIENT)
Dept: PAIN MEDICINE | Facility: CLINIC | Age: 39
End: 2024-01-26
Payer: MEDICAID

## 2024-01-26 DIAGNOSIS — M79.2 NEUROPATHIC PAIN: ICD-10-CM

## 2024-01-26 RX ORDER — GABAPENTIN 800 MG/1
800 TABLET ORAL 3 TIMES DAILY
Qty: 90 TABLET | Refills: 5 | Status: SHIPPED | OUTPATIENT
Start: 2024-01-26 | End: 2024-02-07 | Stop reason: SDUPTHER

## 2024-01-26 NOTE — TELEPHONE ENCOUNTER
----- Message from Jovanna Wood sent at 1/26/2024  2:30 PM CST -----  Contact: pt  Type:  RX Refill Request    Who Called: pt   Refill or New Rx:refill  RX Name and Strength:gabapentin (NEURONTIN) 800 MG tablet  How is the patient currently taking it? (ex. 1XDay):3  Is this a 30 day or 90 day RX:90  Preferred Pharmacy with phone number:[id number] [place], [place] - [address] AT [facility] [address]   Phone: [phone]  Fax: [phone]  Local or Mail Order:Local   Ordering Provider:Roz   Would the patient rather a call back or a response via [facility]? Call   Best Call Back Number:[phone]  Additional Information:      Pt is requesting a call back as well

## 2024-01-26 NOTE — TELEPHONE ENCOUNTER
Good Morning/Afternoon,     Pt is requesting for a refill of: gabapentin 800 mg  Last filed: 12/15/23  Last encounter: 12/15/23  Up coming apt: 2/7/24  Pharmacy: [facility] #[id number]  Is this something you can do?      Moustapha Olvera  Medical Assistant

## 2024-02-05 ENCOUNTER — TELEPHONE (OUTPATIENT)
Dept: OBSTETRICS AND GYNECOLOGY | Facility: CLINIC | Age: 39
End: 2024-02-05
Payer: MEDICAID

## 2024-02-05 NOTE — TELEPHONE ENCOUNTER
Patient called to get an appt for iud removal and insertion. Patient has not been seen in 2 years. Informed her she would need a wwe scheduled first. Patient is okay with this. WWE scheduled for 02/21/24 w/ Dr. Marco Penn at the [facility] location. Patient verbalized understanding. Email sent to schedule appt.

## 2024-02-07 ENCOUNTER — TELEPHONE (OUTPATIENT)
Dept: PAIN MEDICINE | Facility: CLINIC | Age: 39
End: 2024-02-07
Payer: MEDICAID

## 2024-02-07 ENCOUNTER — OFFICE VISIT (OUTPATIENT)
Dept: PAIN MEDICINE | Facility: CLINIC | Age: 39
End: 2024-02-07
Payer: MEDICAID

## 2024-02-07 DIAGNOSIS — M53.3 SACROILIAC JOINT DYSFUNCTION: ICD-10-CM

## 2024-02-07 DIAGNOSIS — M54.16 LUMBAR RADICULOPATHY: Primary | ICD-10-CM

## 2024-02-07 DIAGNOSIS — M54.9 DORSALGIA, UNSPECIFIED: ICD-10-CM

## 2024-02-07 DIAGNOSIS — M47.816 LUMBAR SPONDYLOSIS: ICD-10-CM

## 2024-02-07 DIAGNOSIS — M79.2 NEUROPATHIC PAIN: ICD-10-CM

## 2024-02-07 PROCEDURE — 99441 PR PHYSICIAN TELEPHONE EVALUATION 5-10 MIN: CPT | Mod: 95,,, | Performed by: NURSE PRACTITIONER

## 2024-02-07 RX ORDER — GABAPENTIN 800 MG/1
800 TABLET ORAL 3 TIMES DAILY
Qty: 90 TABLET | Refills: 5 | Status: SHIPPED | OUTPATIENT
Start: 2024-02-07

## 2024-02-07 NOTE — TELEPHONE ENCOUNTER
----- Message from Linda Oglesby sent at 2/7/2024 12:08 PM CST -----  Contact: PHI DOYLE [[id number]]  ..Type:  Patient Requesting Call    Who Called: PHI DOYLE [[id number]]  Does the patient know what this is regarding?: pt reports logging on for 2/7 virtual appt and provider never joined  Would the patient rather a call back or a response via [facility]? call  Best Call Back Number: .[phone]  Additional Information:
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
## 2024-02-07 NOTE — TELEPHONE ENCOUNTER
Called pt and informed pt that I spoke with the provider and provider stated she can still do the visit but as an audio visit at 2:20. Pt verbalized understanding.    Jake MEMBRENO

## 2024-02-13 DIAGNOSIS — M25.562 ACUTE PAIN OF LEFT KNEE: ICD-10-CM

## 2024-02-13 DIAGNOSIS — M25.552 LEFT HIP PAIN: ICD-10-CM

## 2024-02-13 DIAGNOSIS — M47.816 LUMBAR SPONDYLOSIS: ICD-10-CM

## 2024-02-13 DIAGNOSIS — M46.1 SACROILIITIS: ICD-10-CM

## 2024-02-13 RX ORDER — NAPROXEN 500 MG/1
TABLET ORAL
Qty: 30 TABLET | Refills: 0 | Status: SHIPPED | OUTPATIENT
Start: 2024-02-13

## 2024-02-21 ENCOUNTER — OFFICE VISIT (OUTPATIENT)
Dept: OBSTETRICS AND GYNECOLOGY | Facility: CLINIC | Age: 39
End: 2024-02-21
Payer: MEDICAID

## 2024-02-21 VITALS
BODY MASS INDEX: 40 KG/M2 | SYSTOLIC BLOOD PRESSURE: 118 MMHG | WEIGHT: 217.38 LBS | HEIGHT: 62 IN | DIASTOLIC BLOOD PRESSURE: 84 MMHG

## 2024-02-21 DIAGNOSIS — Z12.4 PAP SMEAR FOR CERVICAL CANCER SCREENING: Primary | ICD-10-CM

## 2024-02-21 DIAGNOSIS — Z12.31 ENCOUNTER FOR SCREENING MAMMOGRAM FOR MALIGNANT NEOPLASM OF BREAST: ICD-10-CM

## 2024-02-21 DIAGNOSIS — Z97.5 CONTRACEPTION, DEVICE INTRAUTERINE: ICD-10-CM

## 2024-02-21 PROCEDURE — 88175 CYTOPATH C/V AUTO FLUID REDO: CPT | Performed by: OBSTETRICS & GYNECOLOGY

## 2024-02-21 PROCEDURE — 1160F RVW MEDS BY RX/DR IN RCRD: CPT | Mod: CPTII,,, | Performed by: OBSTETRICS & GYNECOLOGY

## 2024-02-21 PROCEDURE — 99396 PREV VISIT EST AGE 40-64: CPT | Mod: S$PBB,,, | Performed by: OBSTETRICS & GYNECOLOGY

## 2024-02-21 PROCEDURE — 99213 OFFICE O/P EST LOW 20 MIN: CPT | Mod: PBBFAC | Performed by: OBSTETRICS & GYNECOLOGY

## 2024-02-21 PROCEDURE — 3074F SYST BP LT 130 MM HG: CPT | Mod: CPTII,,, | Performed by: OBSTETRICS & GYNECOLOGY

## 2024-02-21 PROCEDURE — 3008F BODY MASS INDEX DOCD: CPT | Mod: CPTII,,, | Performed by: OBSTETRICS & GYNECOLOGY

## 2024-02-21 PROCEDURE — 1159F MED LIST DOCD IN RCRD: CPT | Mod: CPTII,,, | Performed by: OBSTETRICS & GYNECOLOGY

## 2024-02-21 PROCEDURE — 87624 HPV HI-RISK TYP POOLED RSLT: CPT | Performed by: OBSTETRICS & GYNECOLOGY

## 2024-02-21 PROCEDURE — 99999 PR PBB SHADOW E&M-EST. PATIENT-LVL III: CPT | Mod: PBBFAC,,, | Performed by: OBSTETRICS & GYNECOLOGY

## 2024-02-21 PROCEDURE — 3079F DIAST BP 80-89 MM HG: CPT | Mod: CPTII,,, | Performed by: OBSTETRICS & GYNECOLOGY

## 2024-02-21 NOTE — PROGRESS NOTES
"  Subjective:       Patient ID: Marissa Moctezuma is a 38 y.o. female.    Chief Complaint:  Well Woman      History of Present Illness  HPI  The patient presents for exam with no complaints, On Svetlana IUD with no menses, no gyn issues  Contraceptive measures are addressed. Prefers Svetlana   due for replacement this year   Preventive testing reviewed and discussed.    Health Maintenance   Topic Date Due    Mammogram  Never done    Lipid Panel  10/22/2021    TETANUS VACCINE  2031    Hepatitis C Screening  Completed     GYN & OB History  No LMP recorded. Patient has had an implant.   Date of Last Pap: 2024    OB History    Para Term  AB Living   1 1 1     1   SAB IAB Ectopic Multiple Live Births           1      # Outcome Date GA Lbr Faisal/2nd Weight Sex Delivery Anes PTL Lv   1 Term 07    M Vag-Spont None  ANA LILIA       Review of Systems  Review of Systems        Objective:   /84   Ht 5' 2" (1.575 m)   Wt 98.6 kg (217 lb 6 oz)   BMI 39.76 kg/m²    Physical Exam:   Constitutional: She appears well-developed and well-nourished. No distress.      Neck: No JVD present. No thyroid mass and no thyromegaly present.    Cardiovascular:  Normal rate and regular rhythm.                  Abdominal: Soft. Bowel sounds are normal. No hernia. Hernia confirmed negative in the ventral area, confirmed negative in the right inguinal area and confirmed negative in the left inguinal area.     Genitourinary:    Vagina, uterus and rectum normal.     Labial bartholins normal.There is no rash, tenderness, lesion or injury on the right labia. There is no rash, tenderness, lesion or injury on the left labia. Cervix is normal. Right adnexum displays no mass, no tenderness and no fullness. Left adnexum displays no mass, no tenderness and no fullness. No erythema, vaginal discharge, tenderness or bleeding in the vagina.    No foreign body in the vagina.   Cervix exhibits no motion tenderness, no discharge and no " friability.    pap smear completedUterus is not deviated, not enlarged, not fixed and not tender.                    IUD Insertion Procedure Note    Pre-operative Diagnosis:   1. Pap smear for cervical cancer screening    2. Encounter for screening mammogram for malignant neoplasm of breast    3. Contraception, device intrauterine        Post-operative Diagnosis:   1. Pap smear for cervical cancer screening    2. Encounter for screening mammogram for malignant neoplasm of breast    3. Contraception, device intrauterine        Indications: contraception    Procedure Details   Urine pregnancy test  The risks (including infection, bleeding, pain, and uterine perforation) and benefits of the procedure were explained to the patient and Written informed consent was obtained.       Uterus sounded to 6 cm. IUD inserted without difficulty. String visible and trimmed. Patient tolerated procedure well.    Prior to insertion, the existing IUD was removed intact with ring forceps     IUD Information:  Svetlana.    Condition:  Stable    Complications:  None    Plan:    The patient was advised to call for any fever or for prolonged or severe pain or bleeding. She was advised to use OTC ibuprofen as needed for mild to moderate pain.     Attending Physician Documentation:  I was present for or participated in the entire procedure, including opening and closing.    Assessment:        1. Pap smear for cervical cancer screening    2. Encounter for screening mammogram for malignant neoplasm of breast    3. Contraception, device intrauterine                Plan:            Marissa was seen today for well woman.    Diagnoses and all orders for this visit:    Pap smear for cervical cancer screening  -     Liquid-Based Pap Smear, Screening  -     HPV High Risk Genotypes, PCR    Encounter for screening mammogram for malignant neoplasm of breast  -     Mammo Digital Screening Bilat w/ Jcarlos; Future    Contraception, device intrauterine  -      INSERTION OF INTRAUTERINE DEVICE  -     Removal of Intrauterine Device

## 2024-02-27 LAB
FINAL PATHOLOGIC DIAGNOSIS: NORMAL
Lab: NORMAL

## 2024-02-28 LAB
HPV HR 12 DNA SPEC QL NAA+PROBE: NEGATIVE
HPV16 AG SPEC QL: NEGATIVE
HPV18 DNA SPEC QL NAA+PROBE: NEGATIVE
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
## 2024-05-16 ENCOUNTER — TELEPHONE (OUTPATIENT)
Dept: PAIN MEDICINE | Facility: CLINIC | Age: 39
End: 2024-05-16
Payer: MEDICAID

## 2024-05-16 NOTE — TELEPHONE ENCOUNTER
----- Message from Hoa Baires sent at 5/16/2024 12:17 PM CDT -----  Contact: [phone]  Patient called in requesting a call back after missing a call from your office, please call back  [phone]

## 2024-05-16 NOTE — TELEPHONE ENCOUNTER
Spoke with patient and will schedule an appt for LBP that radiates to left hip and leg. Pt agrees with date and time..

## 2024-05-16 NOTE — TELEPHONE ENCOUNTER
----- Message from Lizbeth Monreal sent at 5/16/2024 11:07 AM CDT -----  Contact: Marissa Ann is calling to speak to the nurse regarding her injection, she is complaining of major pain, please give her a call at [phone]    Thanks  LJ

## 2024-05-17 ENCOUNTER — TELEPHONE (OUTPATIENT)
Dept: PAIN MEDICINE | Facility: CLINIC | Age: 39
End: 2024-05-17
Payer: MEDICAID

## 2024-05-17 NOTE — TELEPHONE ENCOUNTER
Reached out to patient to schedule appointment from messages. Apt has been made.   Pt understand. All questions answered.     Margarito Barnes  Medical Assistant

## 2024-05-17 NOTE — TELEPHONE ENCOUNTER
----- Message from Samanta Ruano sent at 5/17/2024  8:11 AM CDT -----  Contact: [phone]  Type:  Sooner Apoointment Request    Caller is requesting a sooner appointment.  Caller declined first available appointment listed below.  Caller will not accept being placed on the waitlist and is requesting a message be sent to doctor.  Name of Caller:Marissa   When is the first available appointment?6/2024  Symptoms:lower back pain   Would the patient rather a call back or a response via [facility]? Call back   Best Call Back Number:[phone]   Additional Information: n/a      Thanks KB

## 2024-06-11 ENCOUNTER — TELEPHONE (OUTPATIENT)
Dept: PAIN MEDICINE | Facility: CLINIC | Age: 39
End: 2024-06-11
Payer: MEDICAID

## 2024-06-11 NOTE — TELEPHONE ENCOUNTER
Reach out to pt to see if she completed her Lumbar MRI. Pt did not answer left vvaleri        Pt can call the Radiology Department at ( [phone] to schedule your MRI.     Margarito Barnes   Medical Assistant

## 2024-06-11 NOTE — TELEPHONE ENCOUNTER
----- Message from Katherine Redmond sent at 6/11/2024 11:41 AM CDT -----  Contact: Alma Rosa, [phone]  Calling to schedule an appointment for leg pain. Please call her. Thanks.

## 2024-06-11 NOTE — TELEPHONE ENCOUNTER
Reach out to pt to see if she completed her Lumbar MRI. Pt did not answer left micah Barnes   Medical Assistant

## 2024-06-11 NOTE — TELEPHONE ENCOUNTER
----- Message from Veronica Courtney PA-C sent at 6/11/2024  1:08 PM CDT -----  Regarding: FW: call back  Can do virtual Thursday or Friday.  ----- Message -----  From: Ashly Cote  Sent: 6/11/2024   1:01 PM CDT  To: [facility] ROSAS Staff  Subject: call back                                        Type:  Patient Returning Call        Who Called:  Pt called back       Who Left Message for Patient:  Margarito olmedo      Does the patient know what this is regarding?  Leg and joint pain       Best Call Back Number:    Telephone Information:  Mobile          [phone]        Additional Information:

## 2024-06-11 NOTE — TELEPHONE ENCOUNTER
----- Message from Marybeltiffany Whitehead sent at 6/11/2024 12:18 PM CDT -----  Contact: Leanne  .Type:  Patient Returning Call    Who Called:Leanne   Who Left Message for Patient:nurse   Does the patient know what this is regarding?:call back   Would the patient rather a call back or a response via [facility]? Call   Best Call Back Number:.[phone]  Additional Information: Pt retuning a call back

## 2024-10-04 DIAGNOSIS — M46.1 SACROILIITIS: ICD-10-CM

## 2024-10-04 DIAGNOSIS — M25.552 LEFT HIP PAIN: ICD-10-CM

## 2024-10-04 DIAGNOSIS — M25.562 ACUTE PAIN OF LEFT KNEE: ICD-10-CM

## 2024-10-04 DIAGNOSIS — M47.816 LUMBAR SPONDYLOSIS: ICD-10-CM

## 2024-10-04 RX ORDER — NAPROXEN 500 MG/1
TABLET ORAL
Qty: 30 TABLET | Refills: 0 | Status: SHIPPED | OUTPATIENT
Start: 2024-10-04

## 2024-11-04 DIAGNOSIS — M47.816 LUMBAR SPONDYLOSIS: ICD-10-CM

## 2024-11-04 DIAGNOSIS — M25.552 LEFT HIP PAIN: ICD-10-CM

## 2024-11-04 DIAGNOSIS — M25.562 ACUTE PAIN OF LEFT KNEE: ICD-10-CM

## 2024-11-04 DIAGNOSIS — M46.1 SACROILIITIS: ICD-10-CM

## 2024-11-04 RX ORDER — NAPROXEN 500 MG/1
TABLET ORAL
Qty: 30 TABLET | Refills: 0 | Status: SHIPPED | OUTPATIENT
Start: 2024-11-04

## 2024-11-15 ENCOUNTER — TELEPHONE (OUTPATIENT)
Dept: PAIN MEDICINE | Facility: CLINIC | Age: 39
End: 2024-11-15
Payer: MEDICAID

## 2024-11-15 NOTE — TELEPHONE ENCOUNTER
----- Message from MianrainReading Rainbowmitch sent at 11/15/2024 10:38 AM CST -----  Contact: Marissa  Type:  Sooner Apoointment Request    Caller is requesting a sooner appointment.  Caller declined first available appointment listed below.  Caller will not accept being placed on the waitlist and is requesting a message be sent to doctor.  Name of Caller:Marissa  When is the first available appointment?none populated  Symptoms:medication refills, wanting to make appointment for injection for sciatic nerve in leg   Would the patient rather a call back or a response via [facility]? call  Best Call Back Number:[phone]  Additional Information:

## 2025-02-12 DIAGNOSIS — M79.2 NEUROPATHIC PAIN: ICD-10-CM

## 2025-02-12 RX ORDER — GABAPENTIN 800 MG/1
800 TABLET ORAL 3 TIMES DAILY
Qty: 90 TABLET | Refills: 5 | Status: SHIPPED | OUTPATIENT
Start: 2025-02-12

## 2025-03-05 ENCOUNTER — TELEPHONE (OUTPATIENT)
Dept: PAIN MEDICINE | Facility: CLINIC | Age: 40
End: 2025-03-05
Payer: MEDICAID

## 2025-03-05 NOTE — TELEPHONE ENCOUNTER
----- Message from James sent at 3/5/2025  3:20 PM CST -----  Contact: self  Type:  RX Refill RequestWho Called: Marissa Mauriceill or New Rx:refillRX Name and Strength:gabapentin (NEURONTIN) 800 MG tabletHow is the patient currently taking it? (ex. 1XDay):3 dayIs this a 30 day or 90 day RX:90 dayPreferred Pharmacy with phone number:[id number] - [place], [place] - [address] [id number] [facility] [address]Okuwj: [phone] Fax: [phone] or Mail Order:localOrdering Provider:Mamadou the patient rather a call back or a response via [facility]? Call [facility] Call Back Number:[phone] Information: the patient doesn't have a car currently she would like to make an appointment but doesn't have the means to get here.

## 2025-03-05 NOTE — TELEPHONE ENCOUNTER
Called patient and informed her that she has refills at the [facility] for her Gabapentin.

## 2025-03-13 NOTE — TELEPHONE ENCOUNTER
----- Message from Hali Chamorro sent at 6/2/2020  1:36 PM CDT -----  Contact: pt  Type:  Needs Medical Advice    Who Called: pt  Symptoms (please be specific):  Cough, congestion, headaches   How long has patient had these symptoms:  n/a  Pharmacy name and phone #: n/a  Would the patient rather a call back or a response via [facility]? Call back  Best Call Back Number: [phone]  Additional Information: n/ap maris   Imaging Studies

## 2025-06-23 ENCOUNTER — TELEPHONE (OUTPATIENT)
Facility: CLINIC | Age: 40
End: 2025-06-23

## 2025-06-23 NOTE — TELEPHONE ENCOUNTER
Returned call to patient and scheduled an appt to discuss injection for back and leg pain with Madison Ricardo NP on 6/24/25 at 10:40 am.  Patient agreed with date and time.